# Patient Record
Sex: MALE | Race: WHITE
[De-identification: names, ages, dates, MRNs, and addresses within clinical notes are randomized per-mention and may not be internally consistent; named-entity substitution may affect disease eponyms.]

---

## 2017-05-03 ENCOUNTER — HOSPITAL ENCOUNTER (OUTPATIENT)
Dept: HOSPITAL 83 - CARD | Age: 57
Discharge: HOME | End: 2017-05-03
Attending: FAMILY MEDICINE
Payer: COMMERCIAL

## 2017-05-03 DIAGNOSIS — I34.0: ICD-10-CM

## 2017-05-03 DIAGNOSIS — R07.9: ICD-10-CM

## 2017-05-03 DIAGNOSIS — I25.2: Primary | ICD-10-CM

## 2017-05-03 DIAGNOSIS — R60.0: ICD-10-CM

## 2017-05-03 DIAGNOSIS — I07.1: ICD-10-CM

## 2017-06-05 ENCOUNTER — HOSPITAL ENCOUNTER (OUTPATIENT)
Dept: HOSPITAL 83 - CP | Age: 57
Discharge: HOME | End: 2017-06-05
Attending: FAMILY MEDICINE
Payer: COMMERCIAL

## 2017-06-05 DIAGNOSIS — J44.9: Primary | ICD-10-CM

## 2018-01-16 ENCOUNTER — HOSPITAL ENCOUNTER (OUTPATIENT)
Dept: HOSPITAL 83 - LAB | Age: 58
Discharge: HOME | End: 2018-01-16
Attending: INTERNAL MEDICINE
Payer: COMMERCIAL

## 2018-01-16 DIAGNOSIS — N40.0: ICD-10-CM

## 2018-01-16 DIAGNOSIS — E11.65: ICD-10-CM

## 2018-01-16 DIAGNOSIS — E11.40: Primary | ICD-10-CM

## 2018-01-16 DIAGNOSIS — E78.5: ICD-10-CM

## 2018-01-16 DIAGNOSIS — E55.9: ICD-10-CM

## 2018-01-16 LAB
25(OH)D3 SERPL-MCNC: 53.6 NG/ML (ref 30–100)
ALBUMIN SERPL-MCNC: 3.6 GM/DL (ref 3.1–4.5)
ALP SERPL-CCNC: 78 U/L (ref 45–117)
ALT SERPL W P-5'-P-CCNC: 28 U/L (ref 12–78)
APPEARANCE UR: (no result)
AST SERPL-CCNC: 14 IU/L (ref 3–35)
BACTERIA #/AREA URNS HPF: (no result) /[HPF]
BILIRUB UR QL STRIP: (no result)
BUN SERPL-MCNC: 12 MG/DL (ref 7–24)
CHLORIDE SERPL-SCNC: 106 MMOL/L (ref 98–107)
CHOLEST SERPL-MCNC: 293 MG/DL (ref ?–200)
COLOR UR: YELLOW
CREAT SERPL-MCNC: 0.83 MG/DL (ref 0.7–1.3)
EPI CELLS #/AREA URNS HPF: (no result) /[HPF]
GLUCOSE UR QL: NEGATIVE
HDLC SERPL-MCNC: 46 MG/DL (ref 40–60)
HGB UR QL STRIP: NEGATIVE
IRON SERPL-MCNC: 31 UG/DL (ref 65–175)
KETONES UR QL STRIP: (no result)
LDLC SERPL DIRECT ASSAY-MCNC: 217 MG/DL (ref 9–159)
LEUKOCYTE ESTERASE UR QL STRIP: NEGATIVE
NITRITE UR QL STRIP: NEGATIVE
PH UR STRIP: 5.5 [PH] (ref 5–9)
POTASSIUM SERPL-SCNC: 3.8 MMOL/L (ref 3.5–5.1)
PROT SERPL-MCNC: 7.2 GM/DL (ref 6.4–8.2)
RBC #/AREA URNS HPF: (no result) RBC/HPF (ref 0–2)
SODIUM SERPL-SCNC: 138 MMOL/L (ref 136–145)
SP GR UR: >= 1.03 (ref 1–1.03)
TESTOST SERPL-MCNC: 257 NG/DL (ref 241–827)
TIBC SERPL-MCNC: 334 UG/DL (ref 250–450)
TRIGL SERPL-MCNC: 148 MG/DL (ref ?–150)
TSH SERPL DL<=0.005 MIU/L-ACNC: 2.88 UIU/ML (ref 0.36–4.75)
UROBILINOGEN UR STRIP-MCNC: 0.2 E.U./DL (ref 0.2–1)
VITAMIN B12: > 2000 PG/ML (ref 247–911)
VLDLC SERPL CALC-MCNC: 30 MG/DL (ref 6–40)
WBC #/AREA URNS HPF: (no result) WBC/HPF (ref 0–5)

## 2018-04-24 ENCOUNTER — HOSPITAL ENCOUNTER (OUTPATIENT)
Dept: HOSPITAL 83 - LAB | Age: 58
Discharge: HOME | End: 2018-04-24
Attending: INTERNAL MEDICINE
Payer: COMMERCIAL

## 2018-04-24 DIAGNOSIS — E78.5: Primary | ICD-10-CM

## 2018-04-24 DIAGNOSIS — E11.40: ICD-10-CM

## 2018-04-24 DIAGNOSIS — E55.9: ICD-10-CM

## 2018-04-24 DIAGNOSIS — E11.65: ICD-10-CM

## 2018-04-24 LAB
25(OH)D3 SERPL-MCNC: 73.1 NG/ML (ref 30–100)
APPEARANCE UR: (no result)
BILIRUB UR QL STRIP: NEGATIVE
BUN SERPL-MCNC: 12 MG/DL (ref 7–24)
CHLORIDE SERPL-SCNC: 105 MMOL/L (ref 98–107)
CHOLEST SERPL-MCNC: 183 MG/DL (ref ?–200)
COLOR UR: YELLOW
CREAT SERPL-MCNC: 0.77 MG/DL (ref 0.7–1.3)
EPI CELLS #/AREA URNS HPF: (no result) /[HPF]
GLUCOSE UR QL: (no result)
HDLC SERPL-MCNC: 39 MG/DL (ref 40–60)
HGB UR QL STRIP: NEGATIVE
KETONES UR QL STRIP: (no result)
LDLC SERPL DIRECT ASSAY-MCNC: 126 MG/DL (ref 9–159)
LEUKOCYTE ESTERASE UR QL STRIP: NEGATIVE
MUCOUS THREADS URNS QL MICRO: (no result)
NITRITE UR QL STRIP: NEGATIVE
PH UR STRIP: 5.5 [PH] (ref 5–9)
POTASSIUM SERPL-SCNC: 4.1 MMOL/L (ref 3.5–5.1)
SODIUM SERPL-SCNC: 140 MMOL/L (ref 136–145)
SP GR UR: >= 1.03 (ref 1–1.03)
TRIGL SERPL-MCNC: 90 MG/DL (ref ?–150)
UROBILINOGEN UR STRIP-MCNC: 0.2 E.U./DL (ref 0.2–1)
VITAMIN B12: > 2000 PG/ML (ref 247–911)
VLDLC SERPL CALC-MCNC: 18 MG/DL (ref 6–40)
WBC #/AREA URNS HPF: (no result) WBC/HPF (ref 0–5)

## 2018-05-31 ENCOUNTER — OFFICE VISIT (OUTPATIENT)
Dept: CARDIOLOGY CLINIC | Age: 58
End: 2018-05-31
Payer: MEDICAID

## 2018-05-31 VITALS
RESPIRATION RATE: 14 BRPM | HEART RATE: 71 BPM | SYSTOLIC BLOOD PRESSURE: 102 MMHG | BODY MASS INDEX: 29.18 KG/M2 | DIASTOLIC BLOOD PRESSURE: 70 MMHG | WEIGHT: 197 LBS | HEIGHT: 69 IN

## 2018-05-31 DIAGNOSIS — I25.10 CORONARY ARTERY DISEASE INVOLVING NATIVE CORONARY ARTERY OF NATIVE HEART WITHOUT ANGINA PECTORIS: Primary | ICD-10-CM

## 2018-05-31 PROCEDURE — G8417 CALC BMI ABV UP PARAM F/U: HCPCS | Performed by: INTERNAL MEDICINE

## 2018-05-31 PROCEDURE — 99214 OFFICE O/P EST MOD 30 MIN: CPT | Performed by: INTERNAL MEDICINE

## 2018-05-31 PROCEDURE — 93000 ELECTROCARDIOGRAM COMPLETE: CPT | Performed by: INTERNAL MEDICINE

## 2018-05-31 PROCEDURE — G8598 ASA/ANTIPLAT THER USED: HCPCS | Performed by: INTERNAL MEDICINE

## 2018-05-31 PROCEDURE — 3017F COLORECTAL CA SCREEN DOC REV: CPT | Performed by: INTERNAL MEDICINE

## 2018-05-31 PROCEDURE — G8427 DOCREV CUR MEDS BY ELIG CLIN: HCPCS | Performed by: INTERNAL MEDICINE

## 2018-05-31 PROCEDURE — 4004F PT TOBACCO SCREEN RCVD TLK: CPT | Performed by: INTERNAL MEDICINE

## 2018-05-31 RX ORDER — LORATADINE 10 MG/1
10 TABLET ORAL DAILY
Qty: 90 TABLET | Refills: 3 | Status: SHIPPED | OUTPATIENT
Start: 2018-05-31 | End: 2019-09-03 | Stop reason: SDUPTHER

## 2018-06-04 ENCOUNTER — HOSPITAL ENCOUNTER (EMERGENCY)
Dept: HOSPITAL 83 - ED | Age: 58
Discharge: HOME | End: 2018-06-04
Payer: COMMERCIAL

## 2018-06-04 VITALS — BODY MASS INDEX: 28.2 KG/M2 | WEIGHT: 197 LBS | HEIGHT: 70 IN
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
## 2018-06-04 DIAGNOSIS — M54.6: Primary | ICD-10-CM

## 2018-06-04 LAB
BUN SERPL-MCNC: 11 MG/DL (ref 7–24)
CHLORIDE SERPL-SCNC: 100 MMOL/L (ref 98–107)
CREAT SERPL-MCNC: 0.81 MG/DL (ref 0.7–1.3)
POTASSIUM SERPL-SCNC: 4.4 MMOL/L (ref 3.5–5.1)
SODIUM SERPL-SCNC: 135 MMOL/L (ref 136–145)
TROPONIN I SERPL-MCNC: < 0.015 NG/ML (ref ?–0.04)

## 2018-07-08 ENCOUNTER — HOSPITAL ENCOUNTER (EMERGENCY)
Dept: HOSPITAL 83 - ED | Age: 58
Discharge: HOME | End: 2018-07-08
Payer: COMMERCIAL

## 2018-07-08 VITALS — BODY MASS INDEX: 29.18 KG/M2 | WEIGHT: 197 LBS | HEIGHT: 68.98 IN
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
## 2018-07-08 DIAGNOSIS — F17.200: ICD-10-CM

## 2018-07-08 DIAGNOSIS — S99.921A: Primary | ICD-10-CM
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
## 2018-08-13 ENCOUNTER — HOSPITAL ENCOUNTER (OUTPATIENT)
Dept: HOSPITAL 83 - LAB | Age: 58
Discharge: HOME | End: 2018-08-13
Attending: INTERNAL MEDICINE
Payer: COMMERCIAL

## 2018-08-13 DIAGNOSIS — E55.9: ICD-10-CM

## 2018-08-13 DIAGNOSIS — E29.1: ICD-10-CM

## 2018-08-13 DIAGNOSIS — E11.65: Primary | ICD-10-CM

## 2018-08-13 LAB
25(OH)D3 SERPL-MCNC: 25.3 NG/ML (ref 30–100)
ALBUMIN SERPL-MCNC: 3.3 GM/DL (ref 3.1–4.5)
ALP SERPL-CCNC: 94 U/L (ref 45–117)
ALT SERPL W P-5'-P-CCNC: 27 U/L (ref 12–78)
APPEARANCE UR: CLEAR
AST SERPL-CCNC: 15 IU/L (ref 3–35)
BILIRUB UR QL STRIP: NEGATIVE
BUN SERPL-MCNC: 9 MG/DL (ref 7–24)
CHLORIDE SERPL-SCNC: 105 MMOL/L (ref 98–107)
CHOLEST SERPL-MCNC: 406 MG/DL (ref ?–200)
COLOR UR: YELLOW
CREAT SERPL-MCNC: 0.74 MG/DL (ref 0.7–1.3)
EPI CELLS #/AREA URNS HPF: (no result) /[HPF]
GLUCOSE UR QL: NEGATIVE
HDLC SERPL-MCNC: 39 MG/DL (ref 40–60)
HGB UR QL STRIP: NEGATIVE
KETONES UR QL STRIP: NEGATIVE
LDLC SERPL DIRECT ASSAY-MCNC: 319 MG/DL (ref 9–159)
LEUKOCYTE ESTERASE UR QL STRIP: NEGATIVE
NITRITE UR QL STRIP: NEGATIVE
PH UR STRIP: 5.5 [PH] (ref 5–9)
POTASSIUM SERPL-SCNC: 4.5 MMOL/L (ref 3.5–5.1)
PROT SERPL-MCNC: 6.6 GM/DL (ref 6.4–8.2)
RBC #/AREA URNS HPF: (no result) RBC/HPF (ref 0–2)
SODIUM SERPL-SCNC: 138 MMOL/L (ref 136–145)
SP GR UR: 1.01 (ref 1–1.03)
TESTOST SERPL-MCNC: 409 NG/DL (ref 241–827)
TRIGL SERPL-MCNC: 239 MG/DL (ref ?–150)
UROBILINOGEN UR STRIP-MCNC: 0.2 E.U./DL (ref 0.2–1)
VLDLC SERPL CALC-MCNC: 48 MG/DL (ref 6–40)
WBC #/AREA URNS HPF: (no result) WBC/HPF (ref 0–5)

## 2018-10-27 ENCOUNTER — HOSPITAL ENCOUNTER (OUTPATIENT)
Age: 58
Setting detail: OBSERVATION
Discharge: HOME | End: 2018-10-29
Attending: INTERNAL MEDICINE | Admitting: INTERNAL MEDICINE
Payer: MEDICAID

## 2018-10-27 PROBLEM — Z91.148 NONCOMPLIANCE WITH MEDICATIONS: Status: ACTIVE | Noted: 2018-10-27

## 2018-10-27 PROBLEM — Z72.0 TOBACCO ABUSE: Status: ACTIVE | Noted: 2018-10-27

## 2018-10-27 PROBLEM — Z91.14 NONCOMPLIANCE WITH MEDICATIONS: Status: ACTIVE | Noted: 2018-10-27

## 2018-10-27 PROBLEM — R07.9 CHEST PAIN: Status: ACTIVE | Noted: 2018-10-27

## 2018-10-27 LAB
ABO/RH: NORMAL
ANION GAP SERPL CALCULATED.3IONS-SCNC: 11 MMOL/L (ref 7–16)
ANTIBODY SCREEN: NORMAL
APTT: 29.4 SEC (ref 24.5–35.1)
BUN BLDV-MCNC: 14 MG/DL (ref 6–20)
CALCIUM SERPL-MCNC: 8.8 MG/DL (ref 8.6–10.2)
CHLORIDE BLD-SCNC: 101 MMOL/L (ref 98–107)
CO2: 24 MMOL/L (ref 22–29)
CREAT SERPL-MCNC: 0.7 MG/DL (ref 0.7–1.2)
EKG ATRIAL RATE: 74 BPM
EKG P AXIS: 62 DEGREES
EKG P-R INTERVAL: 148 MS
EKG Q-T INTERVAL: 364 MS
EKG QRS DURATION: 100 MS
EKG QTC CALCULATION (BAZETT): 404 MS
EKG R AXIS: 19 DEGREES
EKG T AXIS: 33 DEGREES
EKG VENTRICULAR RATE: 74 BPM
GFR AFRICAN AMERICAN: >60
GFR NON-AFRICAN AMERICAN: >60 ML/MIN/1.73
GLUCOSE BLD-MCNC: 205 MG/DL (ref 74–109)
HBA1C MFR BLD: 9 % (ref 4–5.6)
HCT VFR BLD CALC: 45.8 % (ref 37–54)
HEMOGLOBIN: 15.2 G/DL (ref 12.5–16.5)
LV EF: 40 %
LVEF MODALITY: NORMAL
MCH RBC QN AUTO: 29.2 PG (ref 26–35)
MCHC RBC AUTO-ENTMCNC: 33.2 % (ref 32–34.5)
MCV RBC AUTO: 88.1 FL (ref 80–99.9)
METER GLUCOSE: 131 MG/DL (ref 70–110)
METER GLUCOSE: 146 MG/DL (ref 70–110)
METER GLUCOSE: 243 MG/DL (ref 70–110)
PDW BLD-RTO: 13.2 FL (ref 11.5–15)
PLATELET # BLD: 222 E9/L (ref 130–450)
PMV BLD AUTO: 10.8 FL (ref 7–12)
POTASSIUM REFLEX MAGNESIUM: 4.2 MMOL/L (ref 3.5–5)
PRO-BNP: 648 PG/ML (ref 0–125)
RBC # BLD: 5.2 E12/L (ref 3.8–5.8)
SODIUM BLD-SCNC: 136 MMOL/L (ref 132–146)
TROPONIN: 3.76 NG/ML (ref 0–0.03)
TROPONIN: 5.97 NG/ML (ref 0–0.03)
WBC # BLD: 13.5 E9/L (ref 4.5–11.5)

## 2018-10-27 PROCEDURE — 92941 PRQ TRLML REVSC TOT OCCL AMI: CPT | Performed by: INTERNAL MEDICINE

## 2018-10-27 PROCEDURE — 86901 BLOOD TYPING SEROLOGIC RH(D): CPT

## 2018-10-27 PROCEDURE — C1887 CATHETER, GUIDING: HCPCS

## 2018-10-27 PROCEDURE — C1874 STENT, COATED/COV W/DEL SYS: HCPCS

## 2018-10-27 PROCEDURE — 6360000004 HC RX CONTRAST MEDICATION: Performed by: NURSE PRACTITIONER

## 2018-10-27 PROCEDURE — 80048 BASIC METABOLIC PNL TOTAL CA: CPT

## 2018-10-27 PROCEDURE — 85730 THROMBOPLASTIN TIME PARTIAL: CPT

## 2018-10-27 PROCEDURE — 93005 ELECTROCARDIOGRAM TRACING: CPT

## 2018-10-27 PROCEDURE — 86850 RBC ANTIBODY SCREEN: CPT

## 2018-10-27 PROCEDURE — 85027 COMPLETE CBC AUTOMATED: CPT

## 2018-10-27 PROCEDURE — 99255 IP/OBS CONSLTJ NEW/EST HI 80: CPT | Performed by: INTERNAL MEDICINE

## 2018-10-27 PROCEDURE — 2580000003 HC RX 258: Performed by: INTERNAL MEDICINE

## 2018-10-27 PROCEDURE — 6360000002 HC RX W HCPCS

## 2018-10-27 PROCEDURE — 93458 L HRT ARTERY/VENTRICLE ANGIO: CPT | Performed by: INTERNAL MEDICINE

## 2018-10-27 PROCEDURE — 86900 BLOOD TYPING SEROLOGIC ABO: CPT

## 2018-10-27 PROCEDURE — 83036 HEMOGLOBIN GLYCOSYLATED A1C: CPT

## 2018-10-27 PROCEDURE — 6360000002 HC RX W HCPCS: Performed by: STUDENT IN AN ORGANIZED HEALTH CARE EDUCATION/TRAINING PROGRAM

## 2018-10-27 PROCEDURE — C1769 GUIDE WIRE: HCPCS

## 2018-10-27 PROCEDURE — 92928 PRQ TCAT PLMT NTRAC ST 1 LES: CPT | Performed by: INTERNAL MEDICINE

## 2018-10-27 PROCEDURE — 2709999900 HC NON-CHARGEABLE SUPPLY

## 2018-10-27 PROCEDURE — 6370000000 HC RX 637 (ALT 250 FOR IP): Performed by: STUDENT IN AN ORGANIZED HEALTH CARE EDUCATION/TRAINING PROGRAM

## 2018-10-27 PROCEDURE — G0378 HOSPITAL OBSERVATION PER HR: HCPCS

## 2018-10-27 PROCEDURE — 36415 COLL VENOUS BLD VENIPUNCTURE: CPT

## 2018-10-27 PROCEDURE — C1725 CATH, TRANSLUMIN NON-LASER: HCPCS

## 2018-10-27 PROCEDURE — 2500000003 HC RX 250 WO HCPCS

## 2018-10-27 PROCEDURE — 2580000003 HC RX 258: Performed by: STUDENT IN AN ORGANIZED HEALTH CARE EDUCATION/TRAINING PROGRAM

## 2018-10-27 PROCEDURE — 83880 ASSAY OF NATRIURETIC PEPTIDE: CPT

## 2018-10-27 PROCEDURE — 93307 TTE W/O DOPPLER COMPLETE: CPT

## 2018-10-27 PROCEDURE — 84484 ASSAY OF TROPONIN QUANT: CPT

## 2018-10-27 PROCEDURE — 6370000000 HC RX 637 (ALT 250 FOR IP)

## 2018-10-27 PROCEDURE — C1894 INTRO/SHEATH, NON-LASER: HCPCS

## 2018-10-27 PROCEDURE — 82962 GLUCOSE BLOOD TEST: CPT

## 2018-10-27 RX ORDER — DEXTROSE MONOHYDRATE 25 G/50ML
12.5 INJECTION, SOLUTION INTRAVENOUS PRN
Status: DISCONTINUED | OUTPATIENT
Start: 2018-10-27 | End: 2018-10-29 | Stop reason: HOSPADM

## 2018-10-27 RX ORDER — FUROSEMIDE 20 MG/1
20 TABLET ORAL DAILY PRN
Status: DISCONTINUED | OUTPATIENT
Start: 2018-10-27 | End: 2018-10-29 | Stop reason: HOSPADM

## 2018-10-27 RX ORDER — DEXTROSE MONOHYDRATE 50 MG/ML
100 INJECTION, SOLUTION INTRAVENOUS PRN
Status: DISCONTINUED | OUTPATIENT
Start: 2018-10-27 | End: 2018-10-29 | Stop reason: HOSPADM

## 2018-10-27 RX ORDER — FLUTICASONE PROPIONATE 50 MCG
1 SPRAY, SUSPENSION (ML) NASAL DAILY
Status: DISCONTINUED | OUTPATIENT
Start: 2018-10-27 | End: 2018-10-29 | Stop reason: HOSPADM

## 2018-10-27 RX ORDER — CETIRIZINE HYDROCHLORIDE 10 MG/1
5 TABLET ORAL DAILY
Status: DISCONTINUED | OUTPATIENT
Start: 2018-10-27 | End: 2018-10-29 | Stop reason: HOSPADM

## 2018-10-27 RX ORDER — FLUTICASONE PROPIONATE 50 MCG
1 SPRAY, SUSPENSION (ML) NASAL DAILY
COMMUNITY
End: 2019-09-03 | Stop reason: SDUPTHER

## 2018-10-27 RX ORDER — NICOTINE POLACRILEX 4 MG
15 LOZENGE BUCCAL PRN
Status: DISCONTINUED | OUTPATIENT
Start: 2018-10-27 | End: 2018-10-29 | Stop reason: HOSPADM

## 2018-10-27 RX ORDER — METOPROLOL SUCCINATE 25 MG/1
25 TABLET, EXTENDED RELEASE ORAL DAILY
Status: DISCONTINUED | OUTPATIENT
Start: 2018-10-28 | End: 2018-10-28

## 2018-10-27 RX ORDER — EZETIMIBE 10 MG/1
10 TABLET ORAL DAILY
COMMUNITY

## 2018-10-27 RX ORDER — ALBUTEROL SULFATE 90 UG/1
1 AEROSOL, METERED RESPIRATORY (INHALATION) 4 TIMES DAILY PRN
Status: DISCONTINUED | OUTPATIENT
Start: 2018-10-27 | End: 2018-10-29 | Stop reason: HOSPADM

## 2018-10-27 RX ORDER — ATORVASTATIN CALCIUM 80 MG/1
80 TABLET, FILM COATED ORAL NIGHTLY
COMMUNITY

## 2018-10-27 RX ORDER — ATORVASTATIN CALCIUM 40 MG/1
80 TABLET, FILM COATED ORAL NIGHTLY
Status: DISCONTINUED | OUTPATIENT
Start: 2018-10-27 | End: 2018-10-29 | Stop reason: HOSPADM

## 2018-10-27 RX ORDER — METOPROLOL SUCCINATE 25 MG/1
25 TABLET, EXTENDED RELEASE ORAL DAILY
Status: DISCONTINUED | OUTPATIENT
Start: 2018-10-28 | End: 2018-10-27

## 2018-10-27 RX ORDER — SODIUM CHLORIDE 0.9 % (FLUSH) 0.9 %
10 SYRINGE (ML) INJECTION EVERY 12 HOURS SCHEDULED
Status: DISCONTINUED | OUTPATIENT
Start: 2018-10-27 | End: 2018-10-29 | Stop reason: HOSPADM

## 2018-10-27 RX ORDER — OXYCODONE HYDROCHLORIDE AND ACETAMINOPHEN 5; 325 MG/1; MG/1
1 TABLET ORAL EVERY 4 HOURS PRN
Status: DISCONTINUED | OUTPATIENT
Start: 2018-10-27 | End: 2018-10-29 | Stop reason: HOSPADM

## 2018-10-27 RX ORDER — HEPARIN SODIUM 1000 [USP'U]/ML
2000 INJECTION, SOLUTION INTRAVENOUS; SUBCUTANEOUS PRN
Status: DISCONTINUED | OUTPATIENT
Start: 2018-10-27 | End: 2018-10-28

## 2018-10-27 RX ORDER — ASPIRIN 81 MG/1
81 TABLET ORAL DAILY
Status: DISCONTINUED | OUTPATIENT
Start: 2018-10-27 | End: 2018-10-29 | Stop reason: HOSPADM

## 2018-10-27 RX ORDER — SODIUM CHLORIDE 9 MG/ML
INJECTION, SOLUTION INTRAVENOUS CONTINUOUS
Status: ACTIVE | OUTPATIENT
Start: 2018-10-27 | End: 2018-10-28

## 2018-10-27 RX ORDER — NITROGLYCERIN 0.4 MG/1
0.4 TABLET SUBLINGUAL EVERY 5 MIN PRN
Status: DISCONTINUED | OUTPATIENT
Start: 2018-10-27 | End: 2018-10-29 | Stop reason: HOSPADM

## 2018-10-27 RX ORDER — SODIUM CHLORIDE 0.9 % (FLUSH) 0.9 %
10 SYRINGE (ML) INJECTION PRN
Status: DISCONTINUED | OUTPATIENT
Start: 2018-10-27 | End: 2018-10-29 | Stop reason: HOSPADM

## 2018-10-27 RX ORDER — HEPARIN SODIUM 10000 [USP'U]/100ML
12 INJECTION, SOLUTION INTRAVENOUS CONTINUOUS
Status: DISCONTINUED | OUTPATIENT
Start: 2018-10-27 | End: 2018-10-28

## 2018-10-27 RX ORDER — HEPARIN SODIUM 1000 [USP'U]/ML
4000 INJECTION, SOLUTION INTRAVENOUS; SUBCUTANEOUS PRN
Status: DISCONTINUED | OUTPATIENT
Start: 2018-10-27 | End: 2018-10-28

## 2018-10-27 RX ORDER — HEPARIN SODIUM 1000 [USP'U]/ML
4000 INJECTION, SOLUTION INTRAVENOUS; SUBCUTANEOUS ONCE
Status: COMPLETED | OUTPATIENT
Start: 2018-10-27 | End: 2018-10-27

## 2018-10-27 RX ORDER — ONDANSETRON 2 MG/ML
4 INJECTION INTRAMUSCULAR; INTRAVENOUS EVERY 6 HOURS PRN
Status: DISCONTINUED | OUTPATIENT
Start: 2018-10-27 | End: 2018-10-29 | Stop reason: HOSPADM

## 2018-10-27 RX ADMIN — Medication 10 ML: at 22:54

## 2018-10-27 RX ADMIN — HEPARIN SODIUM 4000 UNITS: 1000 INJECTION INTRAVENOUS; SUBCUTANEOUS at 14:42

## 2018-10-27 RX ADMIN — ATORVASTATIN CALCIUM 80 MG: 40 TABLET, FILM COATED ORAL at 22:50

## 2018-10-27 RX ADMIN — HEPARIN SODIUM AND DEXTROSE 12 UNITS/KG/HR: 10000; 5 INJECTION INTRAVENOUS at 14:42

## 2018-10-27 RX ADMIN — PERFLUTREN 1.65 MG: 6.52 INJECTION, SUSPENSION INTRAVENOUS at 14:43

## 2018-10-27 RX ADMIN — SODIUM CHLORIDE: 9 INJECTION, SOLUTION INTRAVENOUS at 22:50

## 2018-10-27 RX ADMIN — FLUTICASONE PROPIONATE 1 SPRAY: 50 SPRAY, METERED NASAL at 17:19

## 2018-10-27 ASSESSMENT — PAIN SCALES - GENERAL
PAINLEVEL_OUTOF10: 0

## 2018-10-27 NOTE — CONSULTS
[address]                 [address] [place],  [facility]                                 CONSULTATION    PATIENT NAME: Aldair Clark                     :         1960  MED REC NO:   [id number]                            ROOM:       6424  ACCOUNT NO:   [de-identified]                           ADMIT DATE:  10/27/2018  PROVIDER:     Merlynn Schaumann, APRN     CONSULT DATE:  10/27/2018    CONSULTING PROVIDER:  Dr. Jon France. REASON FOR THE CONSULT:  Non-ST-elevation myocardial infarction. HISTORY OF PRESENT ILLNESS:  The patient is a 82-year-old   male who is known to Dr. Jerad Foley, most recently seen in the office  with Dr. Kae Martinez on 2018. His medical history includes coronary  artery disease, status post multiple PCI; diabetes mellitus;  hypertension; hyperlipidemia; COPD; current tobacco abuse; vitamin D  deficiency; and medical noncompliance. The patient presented initially to [facility] on  10/27/2018 with complaints of chest pain. He states that prior to  presenting to the emergency department over the course of the past two  weeks he has been having intermittent midsternal chest \"pressure\"  lasting anywhere from three to four hours in duration, occurring at  rest, unchanged with exertion. He states that he has been taking two  sublingual nitroglycerin and having complete alleviation of each of  these episodes until last night. He states that last night he had  midsternal chest pressure, took two sublingual nitroglycerin at rest,  and his chest discomfort was alleviated. He went to bed. He states  that he woke up in the middle of the night with midsternal chest pain  with radiation to his back with accompanying diaphoresis, shortness of  breath, dizziness, and lightheadedness without syncope without  accompanying nausea and vomiting.   He states at that time he took a  total of three sublingual nitroglycerin [facility]  in [place], [place]) (further details unknown. Reports requested,  currently unavailable). 2.  The patient declined further evaluation post cardiac  catheterization with stress testing. 3.  Most recent echocardiogram on 05/30/2017:  EF 60%, no wall motion  abnormality, impaired LV relaxation, trace MR.  4.  Hypertension. 5.  Hyperlipidemia. 6.  Diabetes mellitus. 7.  COPD. 8.  Chronic tobacco abuse. 9.  Reported reactive airway with concurrent beta blocker use  (recently not on beta blocker). 10.  Vitamin D deficiency. FAMILY HISTORY:  The patient denies any family history of premature  coronary artery disease. SOCIAL HISTORY:  Currently smokes 1-1/2 packs of cigarettes per day  and has done so for 40 years. Denies alcohol. No illicit drug use. Caffeine intake includes coffee daily. ALLERGIES:  No known allergies. HOME MEDICATIONS:  Victoza, Ventolin, Amaryl, metformin, Lipitor 80  daily, Zetia, Flonase, Claritin, vitamin D2, aspirin enteric-coated 81  mg daily, Lasix 20 mg daily as needed, K-Dur 10 mEq daily as needed  (the patient states he has only been taking his antidiabetic  medication). HOSPITAL MEDICATIONS:  Aspirin enteric-coated 81 mg daily, Lipitor 80  mg daily, Zyrtec, Flonase, heparin 4000 units IV x1, Humalog, and  heparin drip per low-dose protocol. REVIEW OF SYSTEMS:  CONSTITUTIONAL:  Denies fevers, chills, night  sweats, weight loss, or fatigue. HEENT:  Denies headaches,  nosebleeds, blurred vision, oral pain, or aphthous lesion. MUSCULOSKELETAL:  Denies falls. Complains of pain in bilateral lower  extremities with ambulation. Denies edema to bilateral lower  extremities. NEUROLOGIC:  Denies dizziness, lightheadedness,  numbness, or tingling until today. See HPI. CARDIAC:  Chest pain,  see HPI. Denies palpitations or feelings of his heart racing. LUNGS:  Denies orthopnea or PND. Complains of dyspnea with chest pain earlier  today, see HPI. and medications independently reviewed (see above documentation)     Review of Systems:  Cardiac: As per HPI  General: No fever, chills  Pulmonary: As per HPI  HEENT: No visual disturbances, difficult swallowing  GI: No nausea, vomiting  Endocrine: No thyroid disease, +DM  Musculoskeletal: DELUCA x 4, no focal motor deficits  Skin: Intact, no rashes  Neuro/Psych: No headache or seizures     Physical Exam:  /76   Pulse 75   Temp 97.3 °F (36.3 °C) (Temporal)   Resp 16   Ht 5' 9\" (1.753 m)   Wt 202 lb 12.8 oz (92 kg)   SpO2 93%   BMI 29.95 kg/m²   Appearance: Awake, alert, no acute respiratory distress  Skin: Intact, no rash  Head: Normocephalic, atraumatic  Eyes: EOMI, no conjunctival erythema  ENMT: MMM, no rhinorrhea  Neck: Supple, no carotid bruits  Lungs: Clear to auscultation bilaterally. No wheezes, rales, or rhonchi. Cardiac: Regular rate and rhythm, +S1S2, no murmurs apparent  Abdomen: Soft, +bowel sounds  Extremities: Moves all extremities x 4, no lower extremity edema  Neurologic: No focal motor deficits apparent, normal mood and affect     Laboratory Tests:  Labs from [facility] reviewed     ECG: SR, old IWMI/septal MI pattern (similar to prior EKG)     Telemetry findings reviewed: SR, rate 70's     Impression:  1. Ischemic chest pain / NSTEMI  2. Known CAD s/p multiple prior PCI's  3. Ongoing tobacco abuse (1.5 PPD x 40 years)  4. HTN  5. HLD  6. DM  7. Medication noncompliance  8. History of reactive airway disease -- historically not on a BB  9. Echocardiogram (5/2017): normal LV and RV function, EF 60%, stage 1 DD, no significant valve disease     Plan:  - I had a lengthy discussion with . Cory Jacobo about undergoing a cardiac catheterization +/- PCI (and the possibility of needing a surgical evaluation for CABG). The indication, risks, benefits, and alternatives were discussed with him, and he agrees to proceed with the cardiac catheterization today with Dr. Mike Martínez.  Indication: 4, Score:

## 2018-10-27 NOTE — H&P
Chest pain  - consult cardiology  - cycle enzymes  - EKG  - cardiac cath  - resume home medications  - heparin gtt  - asa, statin     HTN  - monitor BP  - resume home medications    DM 2  - insulin sliding scale  - hold metformin  - monitor BGL  - check A1c    HLD  - lipid panel  - on atorvastatin    Tobacco abuse  - tobacco cessation  - nicotine patch    Noncompliance with medications  - A1c  - lipid panel  - tobacco cessation    CAD with stent placement  - resume home medications  - neurovascular checks          DVT Prophylaxis: heparin  Diet: Diet NPO Effective Now Exceptions are: Sips with Meds  Code Status: Full Code    PT/OT Eval Status: n/a    Dispo - admit telemetry       LYUBOV Wood - CNS    Thank you Becker DO Jose for the opportunity to be involved in this patient's care. If you have any questions or concerns please feel free to contact me at [phone]. [employer] Attending Note    Kyung Ye is a 56 y.o.   male who was seen and examined independently by myself and, after review,  I agree with the history, physical, and assessment documented by the mid-level above. Physical exam:  Cardiovascular: heart rate normal and normal sinus rhythm  Respiratory: Lungs clear to auscultation bilaterally without wheeze, rales or rhonchi throughout all fields. The remainder of the physical exam is as dictated above. Assessment/Plan  · Seen initially when pt arrived as a transfer and pt was symptomatic but later on started having acute chest pain again. Cardiology to do cath with possible PCI. Pt aware. [facility]  Please contact me via [employer]    NOTE: This report was transcribed using voice recognition software. Every effort was made to ensure accuracy; however, inadvertent computerized transcription errors may be present.

## 2018-10-27 NOTE — CONSULTS
I independently interviewed and examined the patient. I have reviewed the above documentation completed by the AMADOU. Please see my additional contributions to the HPI, physical exam, and assessment / medical decision making. Reason for Consultation: Chest pain, NSTEMI    History of Present Illness:  Transferred from [place] to [facility] earlier today for evaluation of chest pain and NSTEMI -- mid-sternal CP, +radiation of the pain to his back, multiple episodes of chest pain over the past 2 weeks, associated SOB/diaphoresis prior to admission, stuttering chest pain this afternoon. Currently with no acute distress at rest. Noncompliant taking medications as an outpatient. Ongoing tobacco abuse (1.5 PPD). PMH, PSH, [address], [place], and medications independently reviewed (see above documentation)    Review of Systems:  Cardiac: As per HPI  General: No fever, chills  Pulmonary: As per HPI  HEENT: No visual disturbances, difficult swallowing  GI: No nausea, vomiting  Endocrine: No thyroid disease, +DM  Musculoskeletal: DELUCA x 4, no focal motor deficits  Skin: Intact, no rashes  Neuro/Psych: No headache or seizures    Physical Exam:  /76   Pulse 75   Temp 97.3 °F (36.3 °C) (Temporal)   Resp 16   Ht 5' 9\" (1.753 m)   Wt 202 lb 12.8 oz (92 kg)   SpO2 93%   BMI 29.95 kg/m²   Appearance: Awake, alert, no acute respiratory distress  Skin: Intact, no rash  Head: Normocephalic, atraumatic  Eyes: EOMI, no conjunctival erythema  ENMT: MMM, no rhinorrhea  Neck: Supple, no carotid bruits  Lungs: Clear to auscultation bilaterally. No wheezes, rales, or rhonchi.   Cardiac: Regular rate and rhythm, +S1S2, no murmurs apparent  Abdomen: Soft, +bowel sounds  Extremities: Moves all extremities x 4, no lower extremity edema  Neurologic: No focal motor deficits apparent, normal mood and affect    Laboratory Tests:  Labs from [place] reviewed    ECG: SR, old IWMI/septal MI pattern (similar to prior EKG)    Telemetry findings reviewed: SR, rate 70's    Impression:  1. Ischemic chest pain / NSTEMI  2. Known CAD s/p multiple prior PCI's  3. Ongoing tobacco abuse (1.5 PPD x 40 years)  4. HTN  5. HLD  6. DM  7. Medication noncompliance  8. History of reactive airway disease -- historically not on a BB  9. Echocardiogram (5/2017): normal LV and RV function, EF 60%, stage 1 DD, no significant valve disease    Plan:  - I had a lengthy discussion with Mr. Smiley Helton about undergoing a cardiac catheterization +/- PCI (and the possibility of needing a surgical evaluation for CABG). The indication, risks, benefits, and alternatives were discussed with him, and he agrees to proceed with the cardiac catheterization today with Dr. Marietta Vela. Indication: 4, Score: 9.  - Hold metformin  - ASA / high intensity statin / NTG / heparin drip  - Aggressive risk factor modifications / tobacco cessation  - Echocardiogram (pending)  - Case discussed with the patient and multiple family members today    Thank you for allowing me to participate in your patient's care. Please feel free to contact me if you have any questions or concerns.     Chichi Reyes MD  [facility]) Cardiology

## 2018-10-28 LAB
ANION GAP SERPL CALCULATED.3IONS-SCNC: 12 MMOL/L (ref 7–16)
APTT: 220 SEC (ref 24.5–35.1)
BUN BLDV-MCNC: 10 MG/DL (ref 6–20)
CALCIUM SERPL-MCNC: 7.9 MG/DL (ref 8.6–10.2)
CHLORIDE BLD-SCNC: 101 MMOL/L (ref 98–107)
CO2: 21 MMOL/L (ref 22–29)
CREAT SERPL-MCNC: 0.7 MG/DL (ref 0.7–1.2)
GFR AFRICAN AMERICAN: >60
GFR NON-AFRICAN AMERICAN: >60 ML/MIN/1.73
GLUCOSE BLD-MCNC: 284 MG/DL (ref 74–109)
HCT VFR BLD CALC: 42.5 % (ref 37–54)
HEMOGLOBIN: 13.9 G/DL (ref 12.5–16.5)
MCH RBC QN AUTO: 28.9 PG (ref 26–35)
MCHC RBC AUTO-ENTMCNC: 32.7 % (ref 32–34.5)
MCV RBC AUTO: 88.4 FL (ref 80–99.9)
METER GLUCOSE: 184 MG/DL (ref 70–110)
METER GLUCOSE: 216 MG/DL (ref 70–110)
METER GLUCOSE: 236 MG/DL (ref 70–110)
METER GLUCOSE: 243 MG/DL (ref 70–110)
PDW BLD-RTO: 13.2 FL (ref 11.5–15)
PHOSPHORUS: 2 MG/DL (ref 2.5–4.5)
PLATELET # BLD: 180 E9/L (ref 130–450)
PMV BLD AUTO: 11 FL (ref 7–12)
POTASSIUM REFLEX MAGNESIUM: 3.7 MMOL/L (ref 3.5–5)
POTASSIUM SERPL-SCNC: 3.7 MMOL/L (ref 3.5–5)
RBC # BLD: 4.81 E12/L (ref 3.8–5.8)
SODIUM BLD-SCNC: 134 MMOL/L (ref 132–146)
TROPONIN: 2.44 NG/ML (ref 0–0.03)
TROPONIN: 4.41 NG/ML (ref 0–0.03)
WBC # BLD: 9.5 E9/L (ref 4.5–11.5)

## 2018-10-28 PROCEDURE — 82962 GLUCOSE BLOOD TEST: CPT

## 2018-10-28 PROCEDURE — 6370000000 HC RX 637 (ALT 250 FOR IP): Performed by: INTERNAL MEDICINE

## 2018-10-28 PROCEDURE — 84484 ASSAY OF TROPONIN QUANT: CPT

## 2018-10-28 PROCEDURE — 6370000000 HC RX 637 (ALT 250 FOR IP): Performed by: STUDENT IN AN ORGANIZED HEALTH CARE EDUCATION/TRAINING PROGRAM

## 2018-10-28 PROCEDURE — 84100 ASSAY OF PHOSPHORUS: CPT

## 2018-10-28 PROCEDURE — 80048 BASIC METABOLIC PNL TOTAL CA: CPT

## 2018-10-28 PROCEDURE — 99233 SBSQ HOSP IP/OBS HIGH 50: CPT | Performed by: INTERNAL MEDICINE

## 2018-10-28 PROCEDURE — 85027 COMPLETE CBC AUTOMATED: CPT

## 2018-10-28 PROCEDURE — 2580000003 HC RX 258: Performed by: STUDENT IN AN ORGANIZED HEALTH CARE EDUCATION/TRAINING PROGRAM

## 2018-10-28 PROCEDURE — 36415 COLL VENOUS BLD VENIPUNCTURE: CPT

## 2018-10-28 PROCEDURE — G0378 HOSPITAL OBSERVATION PER HR: HCPCS

## 2018-10-28 RX ORDER — POTASSIUM CHLORIDE 750 MG/1
10 TABLET, EXTENDED RELEASE ORAL DAILY PRN
Status: DISCONTINUED | OUTPATIENT
Start: 2018-10-28 | End: 2018-10-29 | Stop reason: HOSPADM

## 2018-10-28 RX ORDER — METOPROLOL SUCCINATE 25 MG/1
12.5 TABLET, EXTENDED RELEASE ORAL DAILY
Status: DISCONTINUED | OUTPATIENT
Start: 2018-10-29 | End: 2018-10-29 | Stop reason: HOSPADM

## 2018-10-28 RX ORDER — LISINOPRIL 5 MG/1
2.5 TABLET ORAL DAILY
Status: DISCONTINUED | OUTPATIENT
Start: 2018-10-28 | End: 2018-10-29 | Stop reason: HOSPADM

## 2018-10-28 RX ORDER — GLIMEPIRIDE 4 MG/1
4 TABLET ORAL
Status: DISCONTINUED | OUTPATIENT
Start: 2018-10-28 | End: 2018-10-29 | Stop reason: HOSPADM

## 2018-10-28 RX ADMIN — ATORVASTATIN CALCIUM 80 MG: 40 TABLET, FILM COATED ORAL at 20:10

## 2018-10-28 RX ADMIN — Medication 10 ML: at 07:46

## 2018-10-28 RX ADMIN — TICAGRELOR 90 MG: 90 TABLET ORAL at 20:10

## 2018-10-28 RX ADMIN — INSULIN LISPRO 2 UNITS: 100 INJECTION, SOLUTION INTRAVENOUS; SUBCUTANEOUS at 16:38

## 2018-10-28 RX ADMIN — INSULIN LISPRO 4 UNITS: 100 INJECTION, SOLUTION INTRAVENOUS; SUBCUTANEOUS at 10:42

## 2018-10-28 RX ADMIN — INSULIN LISPRO 2 UNITS: 100 INJECTION, SOLUTION INTRAVENOUS; SUBCUTANEOUS at 20:15

## 2018-10-28 RX ADMIN — TICAGRELOR 90 MG: 90 TABLET ORAL at 07:45

## 2018-10-28 RX ADMIN — GLIMEPIRIDE 4 MG: 4 TABLET ORAL at 07:50

## 2018-10-28 RX ADMIN — FLUTICASONE PROPIONATE 1 SPRAY: 50 SPRAY, METERED NASAL at 07:48

## 2018-10-28 RX ADMIN — INSULIN LISPRO 4 UNITS: 100 INJECTION, SOLUTION INTRAVENOUS; SUBCUTANEOUS at 06:10

## 2018-10-28 RX ADMIN — ASPIRIN 81 MG: 81 TABLET ORAL at 07:45

## 2018-10-28 RX ADMIN — CETIRIZINE HYDROCHLORIDE 5 MG: 10 TABLET ORAL at 07:46

## 2018-10-28 RX ADMIN — VITAMIN D, TAB 1000IU (100/BT) 5000 UNITS: 25 TAB at 07:46

## 2018-10-28 RX ADMIN — Medication 10 ML: at 20:10

## 2018-10-28 ASSESSMENT — PAIN SCALES - GENERAL
PAINLEVEL_OUTOF10: 0
PAINLEVEL_OUTOF10: 0

## 2018-10-28 NOTE — PROCEDURES
[address]                 [address] [facility],  [facility]                           CARDIAC CATHETERIZATION    PATIENT NAME: Abigail Winn                     :         1960  MED REC NO:   [id number]                            ROOM:       6424  ACCOUNT NO:   [de-identified]                           ADMIT DATE:  10/27/2018  PROVIDER:     Ruth Briggs DO    DATE OF PROCEDURE:  10/28/2018    HEART CATHETERIZATION, ANGIOPLASTY, AND STENTING PROCEDURE    PROCEDURES:  1. Left heart catheterization. 2.  Percutaneous coronary intervention with sequential and overlapping  2.5 x 18 mm Harmans, drug-eluting stents to the proximal LAD and 2.25 x  38 mm Azam, drug-eluting stents to the mid LAD. PHYSICIAN:  Kimberly Patel DO    INDICATIONS:  Unstable angina with recurrent chest discomfort in spite  of maximal medical therapy. COMPLICATIONS:  None. SEDATION:  Intravenous Versed. ANESTHESIA:  2% Xylocaine locally over the right radial artery,  intravenous fentanyl. SEDATION TIME:  I was present for sedation administration at  and  I ended sedation at  for a total face-to-face sedation time of 1  hour and 29 minutes. HEMODYNAMICS:  1. Opening aortic pressure 92/60 with a mean of 70.  2.  Left ventricular systolic pressure of 080.  3.  LVEDP 9.  4.  No significant gradient across the aortic valve. ANGIOGRAPHIC RESULTS:  LEFT MAIN:  No angiographically significant stenosis. LAD:  Proximal 80% stenosis proximal to his LAD stent. Long diffuse  99% subtotal occlusion throughout the entire length of his mid LAD  stent with WALT-1 flow. D1:  Ostial 80% stenosis. A 1.7-mm vessel. CIRCUMFLEX:  No angiographically significant stenosis. RAMUS:  20% diffuse in-stent restenosis. OM1:  No angiographically significant stenosis. RIGHT CORONARY ARTERY:  Proximal 100% chronic total occlusion, in  stent.   There is left-to-right

## 2018-10-28 NOTE — PROGRESS NOTES
Hospitalist Progress Note      PCP: Abhinav Mims DO    Date of Admission: 10/27/2018  Days in the hospital: 0    Chief Complaint: chest pain     Hospital Course:     Pt found to have NSTEMI with elvated trop. Crdiology was on the case. Cardiac cath showed Left main: 0% stenosis,LAD: 99 %  ISR stenosis, Circumflex: 0%  0 stenosis. RCA: Dominant. 100 %  stenosis. Pt had stent placed. Subjective  Patient seen and examined at bedside. He feels good and doesn't have any chest pain. Denies any compliants, NAD. Patient denies any fevers, chills, chest pain, shortness of breath, nausea, vomiting. Medications:  Reviewed    Infusion Medications    dextrose       Scheduled Medications    vitamin D  5,000 Units Oral Daily    glimepiride  4 mg Oral Daily with breakfast    sodium chloride flush  10 mL Intravenous 2 times per day    aspirin EC  81 mg Oral Daily    atorvastatin  80 mg Oral Nightly    fluticasone  1 spray Each Nare Daily    cetirizine  5 mg Oral Daily    insulin lispro  0-12 Units Subcutaneous TID WC    insulin lispro  0-6 Units Subcutaneous Nightly    ticagrelor  90 mg Oral BID    metoprolol succinate  25 mg Oral Daily     PRN Meds: potassium chloride, sodium chloride flush, magnesium hydroxide, ondansetron, furosemide, albuterol sulfate HFA, glucose, dextrose, glucagon (rDNA), dextrose, nitroGLYCERIN, oxyCODONE-acetaminophen      Intake/Output Summary (Last 24 hours) at 10/28/18 0931  Last data filed at 10/28/18 0727   Gross per 24 hour   Intake              897 ml   Output             1295 ml   Net             -398 ml       Exam:    BP (!) 99/56   Pulse 79   Temp 97.5 °F (36.4 °C) (Temporal)   Resp 16   Ht 5' 9\" (1.753 m)   Wt 202 lb 12.8 oz (92 kg)   SpO2 94%   BMI 29.95 kg/m²     General appearance:  No apparent distress, appears stated age and cooperative. HEENT:  Normal cephalic, atraumatic without obvious deformity. Pupils equal, round, and reactive to light.

## 2018-10-29 VITALS
RESPIRATION RATE: 18 BRPM | OXYGEN SATURATION: 93 % | HEART RATE: 68 BPM | TEMPERATURE: 98.4 F | HEIGHT: 69 IN | DIASTOLIC BLOOD PRESSURE: 62 MMHG | BODY MASS INDEX: 30.04 KG/M2 | SYSTOLIC BLOOD PRESSURE: 100 MMHG | WEIGHT: 202.8 LBS

## 2018-10-29 LAB
METER GLUCOSE: 206 MG/DL (ref 70–110)
METER GLUCOSE: 218 MG/DL (ref 70–110)
PHOSPHORUS: 2.3 MG/DL (ref 2.5–4.5)
PLATELET # BLD: 182 E9/L (ref 130–450)

## 2018-10-29 PROCEDURE — 6370000000 HC RX 637 (ALT 250 FOR IP): Performed by: INTERNAL MEDICINE

## 2018-10-29 PROCEDURE — 2580000003 HC RX 258: Performed by: STUDENT IN AN ORGANIZED HEALTH CARE EDUCATION/TRAINING PROGRAM

## 2018-10-29 PROCEDURE — 90686 IIV4 VACC NO PRSV 0.5 ML IM: CPT | Performed by: STUDENT IN AN ORGANIZED HEALTH CARE EDUCATION/TRAINING PROGRAM

## 2018-10-29 PROCEDURE — 6360000002 HC RX W HCPCS: Performed by: STUDENT IN AN ORGANIZED HEALTH CARE EDUCATION/TRAINING PROGRAM

## 2018-10-29 PROCEDURE — 36415 COLL VENOUS BLD VENIPUNCTURE: CPT

## 2018-10-29 PROCEDURE — 99232 SBSQ HOSP IP/OBS MODERATE 35: CPT | Performed by: INTERNAL MEDICINE

## 2018-10-29 PROCEDURE — G0378 HOSPITAL OBSERVATION PER HR: HCPCS

## 2018-10-29 PROCEDURE — G0008 ADMIN INFLUENZA VIRUS VAC: HCPCS | Performed by: STUDENT IN AN ORGANIZED HEALTH CARE EDUCATION/TRAINING PROGRAM

## 2018-10-29 PROCEDURE — 85049 AUTOMATED PLATELET COUNT: CPT

## 2018-10-29 PROCEDURE — 84100 ASSAY OF PHOSPHORUS: CPT

## 2018-10-29 PROCEDURE — 6370000000 HC RX 637 (ALT 250 FOR IP): Performed by: STUDENT IN AN ORGANIZED HEALTH CARE EDUCATION/TRAINING PROGRAM

## 2018-10-29 PROCEDURE — 82962 GLUCOSE BLOOD TEST: CPT

## 2018-10-29 RX ORDER — METOPROLOL SUCCINATE 25 MG/1
12.5 TABLET, EXTENDED RELEASE ORAL DAILY
Qty: 30 TABLET | Refills: 0 | Status: SHIPPED | OUTPATIENT
Start: 2018-10-30 | End: 2018-10-30 | Stop reason: SDUPTHER

## 2018-10-29 RX ORDER — LISINOPRIL 2.5 MG/1
2.5 TABLET ORAL DAILY
Qty: 30 TABLET | Refills: 0 | Status: SHIPPED | OUTPATIENT
Start: 2018-10-30 | End: 2018-10-30 | Stop reason: SDUPTHER

## 2018-10-29 RX ADMIN — INSULIN LISPRO 4 UNITS: 100 INJECTION, SOLUTION INTRAVENOUS; SUBCUTANEOUS at 06:52

## 2018-10-29 RX ADMIN — Medication 10 ML: at 09:27

## 2018-10-29 RX ADMIN — TICAGRELOR 90 MG: 90 TABLET ORAL at 09:25

## 2018-10-29 RX ADMIN — CETIRIZINE HYDROCHLORIDE 5 MG: 10 TABLET ORAL at 09:26

## 2018-10-29 RX ADMIN — INSULIN LISPRO 4 UNITS: 100 INJECTION, SOLUTION INTRAVENOUS; SUBCUTANEOUS at 11:27

## 2018-10-29 RX ADMIN — ASPIRIN 81 MG: 81 TABLET ORAL at 09:26

## 2018-10-29 RX ADMIN — LISINOPRIL 2.5 MG: 5 TABLET ORAL at 09:24

## 2018-10-29 RX ADMIN — FLUTICASONE PROPIONATE 1 SPRAY: 50 SPRAY, METERED NASAL at 09:24

## 2018-10-29 RX ADMIN — GLIMEPIRIDE 4 MG: 4 TABLET ORAL at 09:25

## 2018-10-29 RX ADMIN — INFLUENZA A VIRUS A/MICHIGAN/45/2015 X-275 (H1N1) ANTIGEN (FORMALDEHYDE INACTIVATED), INFLUENZA A VIRUS A/SINGAPORE/INFIMH-16-0019/2016 IVR-186 (H3N2) ANTIGEN (FORMALDEHYDE INACTIVATED), INFLUENZA B VIRUS B/PHUKET/3073/2013 ANTIGEN (FORMALDEHYDE INACTIVATED), AND INFLUENZA B VIRUS B/MARYLAND/15/2016 BX-69A ANTIGEN (FORMALDEHYDE INACTIVATED) 0.5 ML: 15; 15; 15; 15 INJECTION, SUSPENSION INTRAMUSCULAR at 12:31

## 2018-10-29 RX ADMIN — METOPROLOL SUCCINATE 12.5 MG: 25 TABLET, FILM COATED, EXTENDED RELEASE ORAL at 09:25

## 2018-10-29 RX ADMIN — VITAMIN D, TAB 1000IU (100/BT) 5000 UNITS: 25 TAB at 09:26

## 2018-10-29 ASSESSMENT — PAIN SCALES - GENERAL
PAINLEVEL_OUTOF10: 0
PAINLEVEL_OUTOF10: 0

## 2018-10-29 NOTE — PROGRESS NOTES
distress, appears stated age and cooperative. HEENT:  Normal cephalic, atraumatic without obvious deformity. Pupils equal, round, and reactive to light. Extra ocular muscles intact. Conjunctivae/corneas clear. Neck: Supple, with full range of motion. No jugular venous distention. Trachea midline. Respiratory:  Normal respiratory effort. Clear to auscultation, bilaterally with inspiratory and expiratory wheezes  Cardiovascular:  Regular rate and rhythm with normal S1/S2 without murmurs, rubs or gallops. Abdomen: Soft, non-tender, non-distended with normal bowel sounds. Musculoskeletal:  No clubbing, cyanosis or edema bilaterally. Full range of motion without deformity. Skin: Skin color, texture, turgor normal.  No rashes or lesions. Neurologic:  Neurovascularly intact without any focal sensory/motor deficits  Psychiatric:  Alert and oriented, thought content appropriate, normal insight  Capillary Refill: Brisk,< 3 seconds   Peripheral Pulses: +2 palpable, equal bilaterally       Labs:   Recent Labs      10/27/18   1343  10/28/18   0424  10/29/18   0516   WBC  13.5*  9.5   --    HGB  15.2  13.9   --    HCT  45.8  42.5   --    PLT  222  180  182     Recent Labs      10/27/18   1343  10/28/18   0424  10/29/18   0516   NA  136  134   --    K  4.2  3.7  3.7   --    CL  101  101   --    CO2  24  21*   --    BUN  14  10   --    CREATININE  0.7  0.7   --    CALCIUM  8.8  7.9*   --    PHOS   --   2.0*  2.3*     No results for input(s): AST, ALT, BILIDIR, BILITOT, ALKPHOS in the last 72 hours. No results for input(s): INR in the last 72 hours.   Recent Labs      10/27/18   2307  10/28/18   0424  10/28/18   1122   TROPONINI  5.97*  4.41*  2.44*       Imaging:  No orders to display         Assessment/Plan:  Active Hospital Problems    Diagnosis Date Noted    Chest pain [R07.9] 10/27/2018    Tobacco abuse [Z72.0] 10/27/2018    Noncompliance with medications [Z91.14] 10/27/2018    HTN (hypertension) [I10]     Hyperlipemia [E78.5]     CAD (coronary artery disease) [I25.10]     Type 2 diabetes mellitus, without long-term current use of insulin (HCC) [E11.9]      ·     Plan:  NSTEMI  - cardiology input appreciated  - cycle enzymes  - EKG  - s/p cardiac cath - Left main: 0% stenosis  LAD: 99 %  ISR stenosis  Circumflex: 0%  0 stenosis. RCA: Dominant. 100 %  stenosis. LV angio: 40%  ejection fraction  - resume home medications  - DAPT  - statin      HTN  - monitor BP  - resume home medications     DM 2  - insulin sliding scale  - hold metformin  - monitor BGL  - check A1c     HLD  - lipid panel  - on atorvastatin     Tobacco abuse  - tobacco cessation  - nicotine patch     Noncompliance with medications  - A1c  - lipid panel  - tobacco cessation     CAD with stent placement  - resume home medications  - neurovascular checks         DVT Prophylaxis: heparin  Diet: Diet NPO Effective Now Exceptions are: Sips with Meds  Code Status: Full Code     PT/OT Eval Status: n/a     Dispo - obs telemetry - pending d/c         Zulema Avilez MD  [employer]  Please contact me through perfect serve    NOTE: This report was transcribed using voice recognition software. Every effort was made to ensure accuracy; however, inadvertent computerized transcription errors may be present.

## 2018-10-29 NOTE — DISCHARGE SUMMARY
Hospital Medicine Discharge Summary    Patient ID: Cordelia Yepez      Patient's PCP: Sintia Hightower DO    Admit Date: 10/27/2018     Discharge Date:    10/29/18    Admitting Physician: Ayaan Cervantes DO     Discharge Physician: Wing Blayne MD     Discharge Diagnoses: Active Hospital Problems    Diagnosis Date Noted    Chest pain [R07.9] 10/27/2018    Tobacco abuse [Z72.0] 10/27/2018    Noncompliance with medications [Z91.14] 10/27/2018    HTN (hypertension) [I10]     Hyperlipemia [E78.5]     CAD (coronary artery disease) [I25.10]     Type 2 diabetes mellitus, without long-term current use of insulin (HCC) [E11.9]        The patient was seen and examined on day of discharge and this discharge summary is in conjunction with any daily progress note from day of discharge. Admission HPI: 56 y.o. male who presented to [address] [place] [address] with chest pain. History obtained from the patient. He states he has been having intermittent chest pain for the past 10 days but it would resolve with his NTG. Last night he developed 8/10 mid-sternal chest pain that would not resolve after 3 NTG radiating to his arms and back, associated with shortness of breath and diaphoresis. He went to [facility] for evaluation. In the ED, he was found to have elevated troponin of 0.22 and questionable EKG changes. He was transferred to [facility] for cardiology services for NSTEMI and possible PCI placement. He has PMH of HTN, DM, HLD and CAD with previous stent placement. He is a current smoker of 1.5 PPD and does admit to some marijuana use. Currently, he is sitting up in bed. He is alert and oriented to person, place and time. He states the pain had been completely gone and is now coming back-- a repeat EKG has been ordered. He denies shortness of breath, nausea or diaphoresis. No leg edema is appreciated.      Hospital Course:   Mr. Cordelia Yepez, a 56y.o. year old male  who  has a past medical Date    WBC 9.5 10/28/2018    HGB 13.9 10/28/2018    HCT 42.5 10/28/2018     10/29/2018       Renal:    Lab Results   Component Value Date     10/28/2018    K 3.7 10/28/2018    K 3.7 10/28/2018     10/28/2018    CO2 21 10/28/2018    BUN 10 10/28/2018    CREATININE 0.7 10/28/2018    CALCIUM 7.9 10/28/2018    PHOS 2.3 10/29/2018       Imaging:  No orders to display         Discharge Medications:     Current Discharge Medication List           Details   lisinopril (PRINIVIL;ZESTRIL) 2.5 MG tablet Take 1 tablet by mouth daily  Qty: 30 tablet, Refills: 0      metoprolol succinate (TOPROL XL) 25 MG extended release tablet Take 0.5 tablets by mouth daily  Qty: 30 tablet, Refills: 0      ticagrelor (BRILINTA) 90 MG TABS tablet Take 1 tablet by mouth 2 times daily  Qty: 60 tablet, Refills: 0      influenza quadrivalent split vaccine (FLUZONE;FLUARIX;FLULAVAL;AFLURIA) 0.5 ML injection Inject 0.5 mLs into the muscle once for 1 dose  Qty: 0.5 mL, Refills: 0              Details   VICTOZA 18 MG/3ML SOPN SC injection Inject into the skin daily       VENTOLIN  (90 BASE) MCG/ACT inhaler Inhale 1 puff into the lungs 4 times daily as needed       glimepiride (AMARYL) 4 MG tablet Take 4 mg by mouth every morning (before breakfast)       metFORMIN (GLUCOPHAGE) 1000 MG tablet Take 1,000 mg by mouth 2 times daily (with meals)       atorvastatin (LIPITOR) 80 MG tablet Take 80 mg by mouth nightly      ezetimibe (ZETIA) 10 MG tablet Take 10 mg by mouth daily      fluticasone (FLONASE) 50 MCG/ACT nasal spray 1 spray by Each Nare route daily      loratadine (CLARITIN) 10 MG tablet Take 1 tablet by mouth daily  Qty: 90 tablet, Refills: 3      vitamin D (ERGOCALCIFEROL) 97971 UNITS CAPS capsule Take 5,000 Units by mouth daily       aspirin EC 81 MG EC tablet Take 81 mg by mouth daily      furosemide (LASIX) 20 MG tablet Take 1 tablet by mouth daily as needed (swelling)  Qty: 30 tablet, Refills: 11      potassium

## 2018-10-30 RX ORDER — LISINOPRIL 2.5 MG/1
2.5 TABLET ORAL DAILY
Qty: 30 TABLET | Refills: 0 | Status: SHIPPED | OUTPATIENT
Start: 2018-10-30 | End: 2019-01-07 | Stop reason: SDUPTHER

## 2018-10-30 RX ORDER — METOPROLOL SUCCINATE 25 MG/1
12.5 TABLET, EXTENDED RELEASE ORAL DAILY
Qty: 30 TABLET | Refills: 0 | Status: SHIPPED | OUTPATIENT
Start: 2018-10-30 | End: 2019-03-27 | Stop reason: SDUPTHER

## 2018-12-13 ENCOUNTER — OFFICE VISIT (OUTPATIENT)
Dept: CARDIOLOGY CLINIC | Age: 58
End: 2018-12-13
Payer: MEDICAID

## 2018-12-13 VITALS
WEIGHT: 197 LBS | SYSTOLIC BLOOD PRESSURE: 110 MMHG | HEIGHT: 69 IN | BODY MASS INDEX: 29.18 KG/M2 | RESPIRATION RATE: 16 BRPM | HEART RATE: 88 BPM | DIASTOLIC BLOOD PRESSURE: 72 MMHG

## 2018-12-13 DIAGNOSIS — I25.10 CORONARY ARTERY DISEASE DUE TO LIPID RICH PLAQUE: Primary | ICD-10-CM

## 2018-12-13 DIAGNOSIS — I25.83 CORONARY ARTERY DISEASE DUE TO LIPID RICH PLAQUE: Primary | ICD-10-CM

## 2018-12-13 PROCEDURE — G8598 ASA/ANTIPLAT THER USED: HCPCS | Performed by: INTERNAL MEDICINE

## 2018-12-13 PROCEDURE — 4004F PT TOBACCO SCREEN RCVD TLK: CPT | Performed by: INTERNAL MEDICINE

## 2018-12-13 PROCEDURE — G8427 DOCREV CUR MEDS BY ELIG CLIN: HCPCS | Performed by: INTERNAL MEDICINE

## 2018-12-13 PROCEDURE — 93000 ELECTROCARDIOGRAM COMPLETE: CPT | Performed by: INTERNAL MEDICINE

## 2018-12-13 PROCEDURE — 3017F COLORECTAL CA SCREEN DOC REV: CPT | Performed by: INTERNAL MEDICINE

## 2018-12-13 PROCEDURE — G8482 FLU IMMUNIZE ORDER/ADMIN: HCPCS | Performed by: INTERNAL MEDICINE

## 2018-12-13 PROCEDURE — G8417 CALC BMI ABV UP PARAM F/U: HCPCS | Performed by: INTERNAL MEDICINE

## 2018-12-13 PROCEDURE — 99214 OFFICE O/P EST MOD 30 MIN: CPT | Performed by: INTERNAL MEDICINE

## 2018-12-13 NOTE — PROGRESS NOTES
Take 81 mg by mouth daily      furosemide (LASIX) 20 MG tablet Take 1 tablet by mouth daily as needed (swelling) 30 tablet 11    potassium chloride (KLOR-CON M) 10 MEQ extended release tablet Take 1 tablet by mouth daily as needed (swelling) 30 tablet 11     No current facility-administered medications for this visit. Social History     Social History    Marital status:      Spouse name: N/A    Number of children: N/A    Years of education: N/A     Occupational History    carpentry Works Part-Time     Social History Main Topics    Smoking status: Current Every Day Smoker     Packs/day: 1.50     Types: Cigarettes    Smokeless tobacco: Never Used    Alcohol use No    Drug use: No    Sexual activity: Not on file     Other Topics Concern    Not on file     Social History Narrative    No narrative on file       Family History   Problem Relation Age of Onset    Diabetes Father     Cancer Father         leukemia       Review of Systems:  Heart: as above   Lungs: as above   Eyes: denies changes in vision or discharge. Ears: denies changes in hearing or pain. Nose: denies epistaxis or masses   Throat: denies sore throat or trouble swallowing. Neuro: denies numbness, tingling, tremors. Skin: denies rashes or itching. : denies hematuria, dysuria   GI: denies vomiting, diarrhea   Psych: denies mood changed, anxiety, depression. All other systems negative. Physical Exam   /72   Pulse 88   Resp 16   Ht 5' 9\" (1.753 m)   Wt 197 lb (89.4 kg)   BMI 29.09 kg/m²   Constitutional: Oriented to person, place, and time. Well-developed and well-nourished. No distress. Head: Normocephalic and atraumatic. Eyes: EOM are normal. Pupils are equal, round, and reactive to light. Neck: Normal range of motion. Neck supple. No hepatojugular reflux and no JVD present. Carotid bruit is not present. No tracheal deviation present. No thyromegaly present.    Cardiovascular: Normal rate,

## 2018-12-18 ENCOUNTER — HOSPITAL ENCOUNTER (OUTPATIENT)
Dept: HOSPITAL 83 - LAB | Age: 58
Discharge: HOME | End: 2018-12-18
Attending: INTERNAL MEDICINE
Payer: COMMERCIAL

## 2018-12-18 DIAGNOSIS — E11.65: ICD-10-CM

## 2018-12-18 DIAGNOSIS — E55.9: ICD-10-CM

## 2018-12-18 DIAGNOSIS — E78.5: ICD-10-CM

## 2018-12-18 DIAGNOSIS — E11.40: Primary | ICD-10-CM

## 2018-12-18 LAB
25(OH)D3 SERPL-MCNC: 44.6 NG/ML (ref 30–100)
ALBUMIN SERPL-MCNC: 3.4 GM/DL (ref 3.1–4.5)
ALP SERPL-CCNC: 94 U/L (ref 45–117)
ALT SERPL W P-5'-P-CCNC: 41 U/L (ref 12–78)
APPEARANCE UR: (no result)
AST SERPL-CCNC: 29 IU/L (ref 3–35)
BACTERIA #/AREA URNS HPF: (no result) /[HPF]
BILIRUB UR QL STRIP: NEGATIVE
BUN SERPL-MCNC: 11 MG/DL (ref 7–24)
CHLORIDE SERPL-SCNC: 104 MMOL/L (ref 98–107)
CHOLEST SERPL-MCNC: 156 MG/DL (ref ?–200)
COLOR UR: YELLOW
CREAT SERPL-MCNC: 0.76 MG/DL (ref 0.7–1.3)
EPI CELLS #/AREA URNS HPF: (no result) /[HPF]
GLUCOSE UR QL: (no result)
HDLC SERPL-MCNC: 37 MG/DL (ref 40–60)
HGB UR QL STRIP: NEGATIVE
KETONES UR QL STRIP: NEGATIVE
LDLC SERPL DIRECT ASSAY-MCNC: 86 MG/DL (ref 9–159)
LEUKOCYTE ESTERASE UR QL STRIP: NEGATIVE
MUCOUS THREADS URNS QL MICRO: (no result)
NITRITE UR QL STRIP: NEGATIVE
PH UR STRIP: 5.5 [PH] (ref 5–9)
POTASSIUM SERPL-SCNC: 4.2 MMOL/L (ref 3.5–5.1)
PROT SERPL-MCNC: 7.1 GM/DL (ref 6.4–8.2)
RBC #/AREA URNS HPF: (no result) RBC/HPF (ref 0–2)
SODIUM SERPL-SCNC: 136 MMOL/L (ref 136–145)
SP GR UR: 1.02 (ref 1–1.03)
TRIGL SERPL-MCNC: 163 MG/DL (ref ?–150)
UROBILINOGEN UR STRIP-MCNC: 0.2 E.U./DL (ref 0.2–1)
VITAMIN B12: 1422 PG/ML (ref 247–911)
VLDLC SERPL CALC-MCNC: 33 MG/DL (ref 6–40)
WBC #/AREA URNS HPF: (no result) WBC/HPF (ref 0–5)

## 2019-01-11 RX ORDER — LISINOPRIL 2.5 MG/1
2.5 TABLET ORAL DAILY
Qty: 30 TABLET | Refills: 5 | Status: SHIPPED | OUTPATIENT
Start: 2019-01-11 | End: 2019-09-03 | Stop reason: SDUPTHER

## 2019-03-26 ENCOUNTER — HOSPITAL ENCOUNTER (OUTPATIENT)
Dept: HOSPITAL 83 - LAB | Age: 59
Discharge: HOME | End: 2019-03-26
Attending: INTERNAL MEDICINE
Payer: COMMERCIAL

## 2019-03-26 DIAGNOSIS — E78.5: ICD-10-CM

## 2019-03-26 DIAGNOSIS — E11.65: Primary | ICD-10-CM

## 2019-03-26 DIAGNOSIS — E55.9: ICD-10-CM

## 2019-03-26 LAB
25(OH)D3 SERPL-MCNC: 58.9 NG/ML (ref 30–100)
ALBUMIN SERPL-MCNC: 3.5 GM/DL (ref 3.1–4.5)
ALP SERPL-CCNC: 85 U/L (ref 45–117)
ALT SERPL W P-5'-P-CCNC: 86 U/L (ref 12–78)
APPEARANCE UR: (no result)
AST SERPL-CCNC: 43 IU/L (ref 3–35)
BACTERIA #/AREA URNS HPF: (no result) /[HPF]
BILIRUB UR QL STRIP: NEGATIVE
BUN SERPL-MCNC: 13 MG/DL (ref 7–24)
CASTS URNS QL MICRO: (no result)
CHLORIDE SERPL-SCNC: 105 MMOL/L (ref 98–107)
CHOLEST SERPL-MCNC: 113 MG/DL (ref ?–200)
COLOR UR: YELLOW
CREAT SERPL-MCNC: 0.8 MG/DL (ref 0.7–1.3)
EPI CELLS #/AREA URNS HPF: (no result) /[HPF]
GLUCOSE UR QL: (no result)
HDLC SERPL-MCNC: 38 MG/DL (ref 40–60)
HGB UR QL STRIP: NEGATIVE
KETONES UR QL STRIP: NEGATIVE
LDLC SERPL DIRECT ASSAY-MCNC: 59 MG/DL (ref 9–159)
LEUKOCYTE ESTERASE UR QL STRIP: NEGATIVE
NITRITE UR QL STRIP: NEGATIVE
PH UR STRIP: 5.5 [PH] (ref 5–9)
POTASSIUM SERPL-SCNC: 4.2 MMOL/L (ref 3.5–5.1)
PROT SERPL-MCNC: 7.2 GM/DL (ref 6.4–8.2)
RBC #/AREA URNS HPF: (no result) RBC/HPF (ref 0–2)
SODIUM SERPL-SCNC: 139 MMOL/L (ref 136–145)
SP GR UR: 1.02 (ref 1–1.03)
TESTOST SERPL-MCNC: 376 NG/DL (ref 241–827)
TRIGL SERPL-MCNC: 80 MG/DL (ref ?–150)
UROBILINOGEN UR STRIP-MCNC: 0.2 E.U./DL (ref 0.2–1)
VLDLC SERPL CALC-MCNC: 16 MG/DL (ref 6–40)
WBC #/AREA URNS HPF: (no result) WBC/HPF (ref 0–5)

## 2019-03-27 RX ORDER — METOPROLOL SUCCINATE 25 MG/1
12.5 TABLET, EXTENDED RELEASE ORAL DAILY
Qty: 30 TABLET | Refills: 3 | Status: SHIPPED
Start: 2019-03-27 | End: 2020-02-07 | Stop reason: SDUPTHER

## 2019-06-04 RX ORDER — DIMENHYDRINATE 50 MG
1 TABLET ORAL DAILY
Refills: 1 | COMMUNITY
Start: 2019-04-01

## 2019-06-04 RX ORDER — 1.1% SODIUM FLUORIDE PRESCRIPTION DENTAL CREAM 5 MG/G
CREAM DENTAL
Refills: 1 | COMMUNITY
Start: 2019-03-30 | End: 2019-09-03

## 2019-06-04 RX ORDER — MECOBAL/LEVOMEFOLAT CA/B6 PHOS 2-3-35 MG
1 TABLET ORAL DAILY
Refills: 1 | COMMUNITY
Start: 2019-05-08

## 2019-06-04 RX ORDER — EMPAGLIFLOZIN 25 MG/1
1 TABLET, FILM COATED ORAL DAILY
Refills: 1 | COMMUNITY
Start: 2019-04-01

## 2019-09-03 ENCOUNTER — OFFICE VISIT (OUTPATIENT)
Dept: CARDIOLOGY CLINIC | Age: 59
End: 2019-09-03
Payer: MEDICAID

## 2019-09-03 ENCOUNTER — HOSPITAL ENCOUNTER (OUTPATIENT)
Dept: HOSPITAL 83 - LAB | Age: 59
Discharge: HOME | End: 2019-09-03
Attending: INTERNAL MEDICINE
Payer: COMMERCIAL

## 2019-09-03 VITALS
DIASTOLIC BLOOD PRESSURE: 62 MMHG | RESPIRATION RATE: 18 BRPM | HEIGHT: 69 IN | WEIGHT: 204 LBS | SYSTOLIC BLOOD PRESSURE: 112 MMHG | HEART RATE: 79 BPM | BODY MASS INDEX: 30.21 KG/M2

## 2019-09-03 DIAGNOSIS — I10 ESSENTIAL HYPERTENSION: Primary | ICD-10-CM

## 2019-09-03 DIAGNOSIS — E11.65: ICD-10-CM

## 2019-09-03 DIAGNOSIS — E78.5: ICD-10-CM

## 2019-09-03 DIAGNOSIS — E11.40: Primary | ICD-10-CM

## 2019-09-03 DIAGNOSIS — E55.9: ICD-10-CM

## 2019-09-03 LAB
25(OH)D3 SERPL-MCNC: 44.7 NG/ML (ref 30–100)
ALBUMIN SERPL-MCNC: 3.5 GM/DL (ref 3.1–4.5)
ALP SERPL-CCNC: 83 U/L (ref 45–117)
ALT SERPL W P-5'-P-CCNC: 43 U/L (ref 12–78)
APPEARANCE UR: CLEAR
AST SERPL-CCNC: 25 IU/L (ref 3–35)
BILIRUB UR QL STRIP: NEGATIVE
BUN SERPL-MCNC: 10 MG/DL (ref 7–24)
CHLORIDE SERPL-SCNC: 108 MMOL/L (ref 98–107)
CHOLEST SERPL-MCNC: 140 MG/DL (ref ?–200)
COLOR UR: YELLOW
CREAT SERPL-MCNC: 0.77 MG/DL (ref 0.7–1.3)
GLUCOSE UR QL: (no result)
HDLC SERPL-MCNC: 41 MG/DL (ref 40–60)
HGB UR QL STRIP: NEGATIVE
KETONES UR QL STRIP: NEGATIVE
LDLC SERPL DIRECT ASSAY-MCNC: 80 MG/DL (ref 9–159)
LEUKOCYTE ESTERASE UR QL STRIP: NEGATIVE
NITRITE UR QL STRIP: NEGATIVE
PH UR STRIP: 5 [PH] (ref 5–9)
POTASSIUM SERPL-SCNC: 4 MMOL/L (ref 3.5–5.1)
PROT SERPL-MCNC: 6.6 GM/DL (ref 6.4–8.2)
RBC #/AREA URNS HPF: (no result) RBC/HPF (ref 0–2)
SODIUM SERPL-SCNC: 139 MMOL/L (ref 136–145)
SP GR UR: 1.01 (ref 1–1.03)
TRIGL SERPL-MCNC: 95 MG/DL (ref ?–150)
UROBILINOGEN UR STRIP-MCNC: 0.2 E.U./DL (ref 0.2–1)
VITAMIN B12: 1328 PG/ML (ref 247–911)
VLDLC SERPL CALC-MCNC: 19 MG/DL (ref 6–40)
WBC #/AREA URNS HPF: (no result) WBC/HPF (ref 0–5)

## 2019-09-03 PROCEDURE — 3017F COLORECTAL CA SCREEN DOC REV: CPT | Performed by: INTERNAL MEDICINE

## 2019-09-03 PROCEDURE — 4004F PT TOBACCO SCREEN RCVD TLK: CPT | Performed by: INTERNAL MEDICINE

## 2019-09-03 PROCEDURE — 99214 OFFICE O/P EST MOD 30 MIN: CPT | Performed by: INTERNAL MEDICINE

## 2019-09-03 PROCEDURE — G8427 DOCREV CUR MEDS BY ELIG CLIN: HCPCS | Performed by: INTERNAL MEDICINE

## 2019-09-03 PROCEDURE — G8598 ASA/ANTIPLAT THER USED: HCPCS | Performed by: INTERNAL MEDICINE

## 2019-09-03 PROCEDURE — G8417 CALC BMI ABV UP PARAM F/U: HCPCS | Performed by: INTERNAL MEDICINE

## 2019-09-03 PROCEDURE — 93000 ELECTROCARDIOGRAM COMPLETE: CPT | Performed by: INTERNAL MEDICINE

## 2019-09-03 RX ORDER — ROSUVASTATIN CALCIUM 40 MG/1
1 TABLET, COATED ORAL DAILY
Refills: 1 | COMMUNITY
Start: 2019-08-23 | End: 2020-04-21

## 2019-09-03 RX ORDER — LORATADINE 10 MG/1
10 TABLET ORAL DAILY
Qty: 90 TABLET | Refills: 3 | Status: SHIPPED | OUTPATIENT
Start: 2019-09-03

## 2019-09-03 RX ORDER — FUROSEMIDE 20 MG/1
20 TABLET ORAL DAILY PRN
Qty: 30 TABLET | Refills: 11 | Status: SHIPPED | OUTPATIENT
Start: 2019-09-03

## 2019-09-03 RX ORDER — GLIMEPIRIDE 2 MG/1
1 TABLET ORAL 2 TIMES DAILY
Refills: 1 | COMMUNITY
Start: 2019-08-05

## 2019-09-03 RX ORDER — FLUTICASONE PROPIONATE 50 MCG
1 SPRAY, SUSPENSION (ML) NASAL DAILY
Qty: 1 BOTTLE | Refills: 3 | Status: SHIPPED | OUTPATIENT
Start: 2019-09-03

## 2019-09-03 RX ORDER — LISINOPRIL 2.5 MG/1
2.5 TABLET ORAL DAILY
Qty: 30 TABLET | Refills: 5 | Status: SHIPPED
Start: 2019-09-03 | End: 2020-04-02

## 2019-09-03 RX ORDER — POTASSIUM CHLORIDE 750 MG/1
10 TABLET, EXTENDED RELEASE ORAL DAILY PRN
Qty: 30 TABLET | Refills: 11 | Status: SHIPPED | OUTPATIENT
Start: 2019-09-03

## 2019-09-03 RX ORDER — NITROGLYCERIN 0.4 MG/1
0.4 TABLET SUBLINGUAL EVERY 5 MIN PRN
Qty: 25 TABLET | Refills: 3 | Status: SHIPPED | OUTPATIENT
Start: 2019-09-03

## 2019-09-03 NOTE — PROGRESS NOTES
CHIEF COMPLAINT: Chest pain/CAD    HISTORY OF PRESENT ILLNESS: Patient is a 66 y.o. male seen at the request of Richie Gómez DO. Patient presents in follow up. History of CAD. No CP or SOB. Past Medical History:   Diagnosis Date    CAD (coronary artery disease)     6 stents.  Details unavailible    DM (diabetes mellitus) ([facility]     HTN (hypertension)     Hyperlipemia     MI (myocardial infarction) ([facility] 2005/2010    stents 6       Patient Active Problem List   Diagnosis    HTN (hypertension)    Hyperlipemia    Type 2 diabetes mellitus, without long-term current use of insulin ([facility]    CAD (coronary artery disease)    Chest pain    Tobacco abuse    Noncompliance with medications       No Known Allergies    Current Outpatient Medications   Medication Sig Dispense Refill    glimepiride (AMARYL) 2 MG tablet Take 1 tablet by mouth 2 times daily  1    rosuvastatin (CRESTOR) 40 MG tablet Take 1 tablet by mouth daily  1    Coenzyme Q10 (CO Q-10) 100 MG CAPS Take 1 capsule by mouth daily  1    L-Methylfolate-B6-B12 (FOLTANX) 3-35-2 MG TABS Take 1 capsule by mouth daily  1    JARDIANCE 25 MG tablet Take 1 tablet by mouth daily  1    metoprolol succinate (TOPROL XL) 25 MG extended release tablet Take 0.5 tablets by mouth daily 30 tablet 3    lisinopril (PRINIVIL;ZESTRIL) 2.5 MG tablet Take 1 tablet by mouth daily 30 tablet 5    ticagrelor (BRILINTA) 90 MG TABS tablet Take 1 tablet by mouth 2 times daily 60 tablet 5    atorvastatin (LIPITOR) 80 MG tablet Take 80 mg by mouth nightly      ezetimibe (ZETIA) 10 MG tablet Take 10 mg by mouth daily      fluticasone (FLONASE) 50 MCG/ACT nasal spray 1 spray by Each Nare route daily      loratadine (CLARITIN) 10 MG tablet Take 1 tablet by mouth daily 90 tablet 3    VICTOZA 18 MG/3ML SOPN SC injection Inject into the skin daily       VENTOLIN  (90 BASE) MCG/ACT inhaler Inhale 1 puff into the lungs 4 times daily as needed       vitamin D

## 2019-10-18 ENCOUNTER — HOSPITAL ENCOUNTER (OUTPATIENT)
Dept: HOSPITAL 83 - US | Age: 59
Discharge: HOME | End: 2019-10-18
Attending: NURSE PRACTITIONER
Payer: COMMERCIAL

## 2019-10-18 DIAGNOSIS — E11.9: ICD-10-CM

## 2019-10-18 DIAGNOSIS — M19.012: ICD-10-CM

## 2019-10-18 DIAGNOSIS — I10: ICD-10-CM

## 2019-10-18 DIAGNOSIS — E78.5: ICD-10-CM

## 2019-10-18 DIAGNOSIS — N40.0: Primary | ICD-10-CM

## 2019-10-18 DIAGNOSIS — R94.5: ICD-10-CM

## 2020-01-28 ENCOUNTER — HOSPITAL ENCOUNTER (OUTPATIENT)
Dept: HOSPITAL 83 - LAB | Age: 60
Discharge: HOME | End: 2020-01-28
Attending: NURSE PRACTITIONER
Payer: COMMERCIAL

## 2020-01-28 DIAGNOSIS — E11.65: ICD-10-CM

## 2020-01-28 DIAGNOSIS — E29.1: ICD-10-CM

## 2020-01-28 DIAGNOSIS — N40.0: Primary | ICD-10-CM

## 2020-01-28 DIAGNOSIS — E11.40: ICD-10-CM

## 2020-01-28 DIAGNOSIS — E55.9: ICD-10-CM

## 2020-01-28 LAB
25(OH)D3 SERPL-MCNC: 38.7 NG/ML (ref 30–100)
ALBUMIN SERPL-MCNC: 3.9 GM/DL (ref 3.1–4.5)
ALP SERPL-CCNC: 83 U/L (ref 45–117)
ALT SERPL W P-5'-P-CCNC: 35 U/L (ref 12–78)
APPEARANCE UR: CLEAR
AST SERPL-CCNC: 17 IU/L (ref 3–35)
BACTERIA #/AREA URNS HPF: (no result) /[HPF]
BILIRUB UR QL STRIP: NEGATIVE
BUN SERPL-MCNC: 12 MG/DL (ref 7–24)
CHLORIDE SERPL-SCNC: 108 MMOL/L (ref 98–107)
CHOLEST SERPL-MCNC: 183 MG/DL (ref ?–200)
COLOR UR: YELLOW
CREAT SERPL-MCNC: 0.89 MG/DL (ref 0.7–1.3)
EPI CELLS #/AREA URNS HPF: (no result) /[HPF]
GLUCOSE UR QL: (no result)
HDLC SERPL-MCNC: 44 MG/DL (ref 40–60)
HGB UR QL STRIP: NEGATIVE
KETONES UR QL STRIP: (no result)
LDLC SERPL DIRECT ASSAY-MCNC: 105 MG/DL (ref 9–159)
LEUKOCYTE ESTERASE UR QL STRIP: NEGATIVE
MUCOUS THREADS URNS QL MICRO: (no result)
NITRITE UR QL STRIP: NEGATIVE
PH UR STRIP: 6 [PH] (ref 5–9)
POTASSIUM SERPL-SCNC: 4.3 MMOL/L (ref 3.5–5.1)
PROT SERPL-MCNC: 7 GM/DL (ref 6.4–8.2)
RBC #/AREA URNS HPF: (no result) RBC/HPF (ref 0–2)
SODIUM SERPL-SCNC: 139 MMOL/L (ref 136–145)
SP GR UR: 1.03 (ref 1–1.03)
TESTOST SERPL-MCNC: 429 NG/DL (ref 241–827)
TRIGL SERPL-MCNC: 169 MG/DL (ref ?–150)
UROBILINOGEN UR STRIP-MCNC: 0.2 E.U./DL (ref 0.2–1)
VITAMIN B12: 965 PG/ML (ref 247–911)
VLDLC SERPL CALC-MCNC: 34 MG/DL (ref 6–40)
WBC #/AREA URNS HPF: (no result) WBC/HPF (ref 0–5)

## 2020-02-07 RX ORDER — METOPROLOL SUCCINATE 25 MG/1
12.5 TABLET, EXTENDED RELEASE ORAL DAILY
Qty: 30 TABLET | Refills: 3 | Status: SHIPPED | OUTPATIENT
Start: 2020-02-07

## 2020-04-02 RX ORDER — LISINOPRIL 2.5 MG/1
TABLET ORAL
Qty: 30 TABLET | Refills: 0 | Status: SHIPPED
Start: 2020-04-02 | End: 2020-04-21 | Stop reason: SDUPTHER

## 2020-04-17 RX ORDER — FERROUS SULFATE 325(65) MG
1 TABLET ORAL DAILY
COMMUNITY
Start: 2020-02-04 | End: 2020-04-17 | Stop reason: CLARIF

## 2020-04-20 RX ORDER — SEMAGLUTIDE 1.34 MG/ML
1 INJECTION, SOLUTION SUBCUTANEOUS WEEKLY
COMMUNITY
Start: 2020-01-31

## 2020-04-20 RX ORDER — DEXTROAMPHETAMINE SACCHARATE, AMPHETAMINE ASPARTATE, DEXTROAMPHETAMINE SULFATE AND AMPHETAMINE SULFATE 2.5; 2.5; 2.5; 2.5 MG/1; MG/1; MG/1; MG/1
10 TABLET ORAL DAILY
COMMUNITY

## 2020-04-21 ENCOUNTER — VIRTUAL VISIT (OUTPATIENT)
Dept: CARDIOLOGY CLINIC | Age: 60
End: 2020-04-21
Payer: MEDICAID

## 2020-04-21 VITALS — HEIGHT: 69 IN | BODY MASS INDEX: 30.21 KG/M2 | WEIGHT: 204 LBS

## 2020-04-21 PROCEDURE — 99442 PR PHYS/QHP TELEPHONE EVALUATION 11-20 MIN: CPT | Performed by: INTERNAL MEDICINE

## 2020-04-21 RX ORDER — LISINOPRIL 2.5 MG/1
2.5 TABLET ORAL DAILY
Qty: 90 TABLET | Refills: 3 | Status: SHIPPED | OUTPATIENT
Start: 2020-04-21

## 2020-04-21 NOTE — PROGRESS NOTES
Q-10) 100 MG CAPS Take 1 capsule by mouth daily  1    L-Methylfolate-B6-B12 (FOLTANX) 3-35-2 MG TABS Take 1 capsule by mouth daily  1    JARDIANCE 25 MG tablet Take 1 tablet by mouth daily  1    atorvastatin (LIPITOR) 80 MG tablet Take 80 mg by mouth nightly      ezetimibe (ZETIA) 10 MG tablet Take 10 mg by mouth daily      vitamin D (ERGOCALCIFEROL) 80244 UNITS CAPS capsule Take 5,000 Units by mouth daily       metFORMIN (GLUCOPHAGE) 1000 MG tablet Take 1,000 mg by mouth 2 times daily (with meals)       aspirin EC 81 MG EC tablet Take 81 mg by mouth daily      VICTOZA 18 MG/3ML SOPN SC injection Inject into the skin daily        No current facility-administered medications for this visit.         Social History     Socioeconomic History    Marital status:      Spouse name: Not on file    Number of children: Not on file    Years of education: Not on file    Highest education level: Not on file   Occupational History    Occupation: [employer]     Employer: [employer]TIME   Social Needs    Financial resource strain: Not on file    Food insecurity     Worry: Not on file     Inability: Not on file    Transportation needs     Medical: Not on file     Non-medical: Not on file   Tobacco Use    Smoking status: Current Every Day Smoker     Packs/day: 1.50     Types: Cigarettes    Smokeless tobacco: Never Used   Substance and Sexual Activity    Alcohol use: No    Drug use: No    Sexual activity: Not on file   Lifestyle    Physical activity     Days per week: Not on file     Minutes per session: Not on file    Stress: Not on file   Relationships    Social connections     Talks on phone: Not on file     Gets together: Not on file     Attends Mosque service: Not on file     Active member of club or organization: Not on file     Attends meetings of clubs or organizations: Not on file     Relationship status: Not on file    Intimate partner violence     Fear of current or ex partner: Not on file Emotionally abused: Not on file     Physically abused: Not on file     Forced sexual activity: Not on file   Other Topics Concern    Not on file   Social History Narrative    Not on file       Family History   Problem Relation Age of Onset    Diabetes Father     Cancer Father         leukemia       Review of Systems:  Heart: as above   Lungs: as above   Eyes: denies changes in vision or discharge. Ears: denies changes in hearing or pain. Nose: denies epistaxis or masses   Throat: denies sore throat or trouble swallowing. Neuro: denies numbness, tingling, tremors. Skin: denies rashes or itching. : denies hematuria, dysuria   GI: denies vomiting, diarrhea   Psych: denies mood changed, anxiety, depression. All other systems negative. Physical Exam   Ht 5' 9\" (1.753 m)   Wt 204 lb (92.5 kg)   BMI 30.13 kg/m²     Telephone call due to COVID 19.     Echo Summary 5/3/17: LVEF 60% with trace MR.    ASSESSMENT AND PLAN:  Patient Active Problem List   Diagnosis    HTN (hypertension)    Hyperlipemia    Type 2 diabetes mellitus, without long-term current use of insulin (HCC)    CAD (coronary artery disease)    Chest pain    Tobacco abuse    Noncompliance with medications     1. Chest pain/Hx of CAD with multiple stents:    PCI to LAD 10/28/18 with ALFREDO x 2. Continue ASA/statin/BB. 2. HTN: Controlled. 3. Lipids: Statin. 4. DM: Per PCP. Benji Au D.O. Cardiologist  Cardiology, [facility]    Sindy Harris is a 64 y.o. male evaluated via telephone on 4/21/2020. Consent:  He and/or health care decision maker is aware that that he may receive a bill for this telephone service, depending on his insurance coverage, and has provided verbal consent to proceed: Yes      Documentation:  I communicated with the patient and/or health care decision maker about there cardiovascular issues, see above documentation.     I affirm this is a Patient Initiated Episode with

## 2020-06-18 ENCOUNTER — HOSPITAL ENCOUNTER (OUTPATIENT)
Dept: HOSPITAL 83 - LAB | Age: 60
Discharge: HOME | End: 2020-06-18
Attending: INTERNAL MEDICINE
Payer: COMMERCIAL

## 2020-06-18 DIAGNOSIS — E11.40: ICD-10-CM

## 2020-06-18 DIAGNOSIS — E11.65: Primary | ICD-10-CM

## 2020-06-18 DIAGNOSIS — E78.5: ICD-10-CM

## 2020-06-18 DIAGNOSIS — E55.9: ICD-10-CM

## 2020-06-18 LAB
25(OH)D3 SERPL-MCNC: 51.2 NG/ML (ref 30–100)
ALBUMIN SERPL-MCNC: 3.4 GM/DL (ref 3.1–4.5)
ALP SERPL-CCNC: 75 U/L (ref 45–117)
ALT SERPL W P-5'-P-CCNC: 27 U/L (ref 12–78)
APPEARANCE UR: CLEAR
AST SERPL-CCNC: 18 IU/L (ref 3–35)
BACTERIA #/AREA URNS HPF: (no result) /[HPF]
BILIRUB UR QL STRIP: NEGATIVE
BUN SERPL-MCNC: 10 MG/DL (ref 7–24)
CHLORIDE SERPL-SCNC: 109 MMOL/L (ref 98–107)
CHOLEST SERPL-MCNC: 202 MG/DL (ref ?–200)
COLOR UR: YELLOW
CREAT SERPL-MCNC: 0.83 MG/DL (ref 0.7–1.3)
GLUCOSE UR QL: (no result)
HDLC SERPL-MCNC: 41 MG/DL (ref 40–60)
HGB UR QL STRIP: NEGATIVE
KETONES UR QL STRIP: NEGATIVE
LDLC SERPL DIRECT ASSAY-MCNC: 142 MG/DL (ref 9–159)
LEUKOCYTE ESTERASE UR QL STRIP: NEGATIVE
MUCOUS THREADS URNS QL MICRO: (no result)
NITRITE UR QL STRIP: NEGATIVE
PH UR STRIP: 6 [PH] (ref 5–9)
POTASSIUM SERPL-SCNC: 4.4 MMOL/L (ref 3.5–5.1)
PROT SERPL-MCNC: 6.6 GM/DL (ref 6.4–8.2)
RBC #/AREA URNS HPF: (no result) RBC/HPF (ref 0–2)
SODIUM SERPL-SCNC: 140 MMOL/L (ref 136–145)
SP GR UR: 1.02 (ref 1–1.03)
TRIGL SERPL-MCNC: 97 MG/DL (ref ?–150)
UROBILINOGEN UR STRIP-MCNC: 0.2 E.U./DL (ref 0.2–1)
VITAMIN B12: 817 PG/ML (ref 247–911)
VLDLC SERPL CALC-MCNC: 19 MG/DL (ref 6–40)

## 2020-08-31 ENCOUNTER — HOSPITAL ENCOUNTER (INPATIENT)
Dept: HOSPITAL 83 - ED | Age: 60
LOS: 3 days | Discharge: HOME | DRG: 135 | End: 2020-09-03
Attending: STUDENT IN AN ORGANIZED HEALTH CARE EDUCATION/TRAINING PROGRAM | Admitting: STUDENT IN AN ORGANIZED HEALTH CARE EDUCATION/TRAINING PROGRAM
Payer: COMMERCIAL

## 2020-08-31 VITALS — DIASTOLIC BLOOD PRESSURE: 65 MMHG | SYSTOLIC BLOOD PRESSURE: 114 MMHG

## 2020-08-31 VITALS — WEIGHT: 189.9 LBS | BODY MASS INDEX: 28.78 KG/M2 | HEIGHT: 67.99 IN

## 2020-08-31 VITALS — DIASTOLIC BLOOD PRESSURE: 73 MMHG

## 2020-08-31 VITALS — DIASTOLIC BLOOD PRESSURE: 76 MMHG

## 2020-08-31 DIAGNOSIS — F17.210: ICD-10-CM

## 2020-08-31 DIAGNOSIS — E78.5: ICD-10-CM

## 2020-08-31 DIAGNOSIS — J44.9: ICD-10-CM

## 2020-08-31 DIAGNOSIS — E44.0: ICD-10-CM
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
## 2020-08-31 DIAGNOSIS — F32.9: ICD-10-CM

## 2020-08-31 DIAGNOSIS — I25.10: ICD-10-CM

## 2020-08-31 DIAGNOSIS — Z71.6: ICD-10-CM

## 2020-08-31 DIAGNOSIS — Z83.3: ICD-10-CM

## 2020-08-31 DIAGNOSIS — M17.12: ICD-10-CM

## 2020-08-31 DIAGNOSIS — Z79.899: ICD-10-CM
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
## 2020-08-31 DIAGNOSIS — E11.69: ICD-10-CM
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
## 2020-08-31 DIAGNOSIS — S22.43XA: Primary | ICD-10-CM

## 2020-08-31 DIAGNOSIS — Z79.84: ICD-10-CM

## 2020-08-31 DIAGNOSIS — S82.142A: ICD-10-CM

## 2020-08-31 DIAGNOSIS — I25.2: ICD-10-CM

## 2020-08-31 DIAGNOSIS — Z79.82: ICD-10-CM

## 2020-08-31 DIAGNOSIS — I10: ICD-10-CM

## 2020-09-01 VITALS — SYSTOLIC BLOOD PRESSURE: 124 MMHG | DIASTOLIC BLOOD PRESSURE: 74 MMHG

## 2020-09-01 VITALS — SYSTOLIC BLOOD PRESSURE: 126 MMHG | DIASTOLIC BLOOD PRESSURE: 77 MMHG

## 2020-09-01 VITALS — DIASTOLIC BLOOD PRESSURE: 72 MMHG

## 2020-09-01 VITALS — DIASTOLIC BLOOD PRESSURE: 77 MMHG | SYSTOLIC BLOOD PRESSURE: 121 MMHG

## 2020-09-01 VITALS — SYSTOLIC BLOOD PRESSURE: 122 MMHG | DIASTOLIC BLOOD PRESSURE: 78 MMHG

## 2020-09-01 LAB
ALBUMIN SERPL-MCNC: 2.8 GM/DL (ref 3.1–4.5)
ALP SERPL-CCNC: 73 U/L (ref 45–117)
ALT SERPL W P-5'-P-CCNC: 25 U/L (ref 12–78)
APTT PPP: 32 SECONDS (ref 20–32.1)
AST SERPL-CCNC: 18 IU/L (ref 3–35)
BASOPHILS # BLD AUTO: 3 % (ref 0–1)
BUN SERPL-MCNC: 13 MG/DL (ref 7–24)
CHLORIDE SERPL-SCNC: 105 MMOL/L (ref 98–107)
CREAT SERPL-MCNC: 0.81 MG/DL (ref 0.7–1.3)
ERYTHROCYTE [DISTWIDTH] IN BLOOD BY AUTOMATED COUNT: 13.4 % (ref 0–14.5)
HCT VFR BLD AUTO: 45.9 % (ref 42–52)
INR BLD: 1 (ref 2–3.5)
MCH RBC QN AUTO: 29.7 PG (ref 27–31)
MCHC RBC AUTO-ENTMCNC: 32.2 G/DL (ref 33–37)
MCV RBC AUTO: 92 FL (ref 80–94)
NRBC BLD QL AUTO: 0 10*3/UL (ref 0–0)
PLATELET # BLD AUTO: 159 10*3/UL (ref 130–400)
PLATELET SUFFICIENCY: NORMAL
PMV BLD AUTO: 10.5 FL (ref 9.6–12.3)
POTASSIUM SERPL-SCNC: 3.9 MMOL/L (ref 3.5–5.1)
PROT SERPL-MCNC: 6.5 GM/DL (ref 6.4–8.2)
RBC # BLD AUTO: 4.99 10*6/UL (ref 4.5–5.9)
RBC MORPH BLD: NORMAL
SODIUM SERPL-SCNC: 135 MMOL/L (ref 136–145)
T4 FREE SERPL-MCNC: 1.13 NG/DL (ref 0.76–1.46)
TOTAL CELLS COUNTED: 100 #CELLS
TSH SERPL DL<=0.005 MIU/L-ACNC: 1.81 UIU/ML (ref 0.36–4.75)
WBC NRBC COR # BLD AUTO: 10.1 10*3/UL (ref 4.8–10.8)

## 2020-09-02 VITALS — DIASTOLIC BLOOD PRESSURE: 75 MMHG

## 2020-09-02 VITALS — SYSTOLIC BLOOD PRESSURE: 126 MMHG | DIASTOLIC BLOOD PRESSURE: 80 MMHG

## 2020-09-02 VITALS — DIASTOLIC BLOOD PRESSURE: 73 MMHG

## 2020-09-02 VITALS — DIASTOLIC BLOOD PRESSURE: 74 MMHG

## 2020-09-03 VITALS — SYSTOLIC BLOOD PRESSURE: 108 MMHG | DIASTOLIC BLOOD PRESSURE: 74 MMHG

## 2020-09-03 VITALS — DIASTOLIC BLOOD PRESSURE: 73 MMHG | SYSTOLIC BLOOD PRESSURE: 119 MMHG

## 2020-09-03 VITALS — DIASTOLIC BLOOD PRESSURE: 77 MMHG

## 2020-09-09 ENCOUNTER — HOSPITAL ENCOUNTER (OUTPATIENT)
Dept: HOSPITAL 83 - ORTHO | Age: 60
Discharge: HOME | End: 2020-09-09
Attending: ORTHOPAEDIC SURGERY
Payer: COMMERCIAL
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
## 2020-09-09 DIAGNOSIS — S82.142A: Primary | ICD-10-CM
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
## 2020-09-09 DIAGNOSIS — M25.462: ICD-10-CM

## 2020-09-23 ENCOUNTER — HOSPITAL ENCOUNTER (OUTPATIENT)
Dept: HOSPITAL 83 - ORTHO | Age: 60
Discharge: HOME | End: 2020-09-23
Attending: ORTHOPAEDIC SURGERY
Payer: COMMERCIAL
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
## 2020-09-23 DIAGNOSIS — S82.145A: Primary | ICD-10-CM

## 2020-09-29 ENCOUNTER — HOSPITAL ENCOUNTER (OUTPATIENT)
Dept: HOSPITAL 83 - RESCLI | Age: 60
Discharge: HOME | End: 2020-09-29
Attending: STUDENT IN AN ORGANIZED HEALTH CARE EDUCATION/TRAINING PROGRAM
Payer: COMMERCIAL

## 2020-09-29 DIAGNOSIS — Z98.890: ICD-10-CM

## 2020-09-29 DIAGNOSIS — F32.9: ICD-10-CM

## 2020-09-29 DIAGNOSIS — I25.10: ICD-10-CM

## 2020-09-29 DIAGNOSIS — Z71.6: ICD-10-CM

## 2020-09-29 DIAGNOSIS — R05: ICD-10-CM

## 2020-09-29 DIAGNOSIS — E55.9: ICD-10-CM

## 2020-09-29 DIAGNOSIS — R60.9: ICD-10-CM

## 2020-09-29 DIAGNOSIS — I25.2: ICD-10-CM

## 2020-09-29 DIAGNOSIS — E78.5: ICD-10-CM

## 2020-09-29 DIAGNOSIS — S82.142D: ICD-10-CM

## 2020-09-29 DIAGNOSIS — I10: ICD-10-CM

## 2020-09-29 DIAGNOSIS — Z79.899: ICD-10-CM

## 2020-09-29 DIAGNOSIS — J44.9: ICD-10-CM

## 2020-09-29 DIAGNOSIS — E11.8: Primary | ICD-10-CM
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
## 2020-10-08 ENCOUNTER — HOSPITAL ENCOUNTER (OUTPATIENT)
Dept: HOSPITAL 83 - CARD | Age: 60
Discharge: HOME | End: 2020-10-08
Attending: INTERNAL MEDICINE
Payer: COMMERCIAL

## 2020-10-08 DIAGNOSIS — R60.9: Primary | ICD-10-CM

## 2020-10-14 ENCOUNTER — HOSPITAL ENCOUNTER (OUTPATIENT)
Dept: HOSPITAL 83 - ORTHO | Age: 60
Discharge: HOME | End: 2020-10-14
Attending: ORTHOPAEDIC SURGERY
Payer: COMMERCIAL

## 2020-10-14 DIAGNOSIS — M25.462: ICD-10-CM
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
## 2020-10-14 DIAGNOSIS — M17.12: ICD-10-CM

## 2020-10-14 DIAGNOSIS — S82.142D: Primary | ICD-10-CM

## 2020-11-03 ENCOUNTER — HOSPITAL ENCOUNTER (OUTPATIENT)
Dept: HOSPITAL 83 - LAB | Age: 60
Discharge: HOME | End: 2020-11-03
Attending: INTERNAL MEDICINE
Payer: COMMERCIAL

## 2020-11-03 DIAGNOSIS — E11.65: ICD-10-CM

## 2020-11-03 DIAGNOSIS — E55.9: ICD-10-CM

## 2020-11-03 DIAGNOSIS — E11.40: Primary | ICD-10-CM

## 2020-11-03 DIAGNOSIS — N40.0: ICD-10-CM

## 2020-11-03 DIAGNOSIS — E78.5: ICD-10-CM

## 2020-11-03 LAB
25(OH)D3 SERPL-MCNC: 36 NG/ML (ref 30–100)
ALBUMIN SERPL-MCNC: 3.1 GM/DL (ref 3.1–4.5)
ALP SERPL-CCNC: 102 U/L (ref 45–117)
ALT SERPL W P-5'-P-CCNC: 24 U/L (ref 12–78)
AST SERPL-CCNC: 13 IU/L (ref 3–35)
BUN SERPL-MCNC: 12 MG/DL (ref 7–24)
CHLORIDE SERPL-SCNC: 104 MMOL/L (ref 98–107)
CHOLEST SERPL-MCNC: 276 MG/DL (ref ?–200)
CREAT SERPL-MCNC: 0.78 MG/DL (ref 0.7–1.3)
GLUCOSE UR QL: (no result)
HDLC SERPL-MCNC: 44 MG/DL (ref 40–60)
LDLC SERPL DIRECT ASSAY-MCNC: 204 MG/DL (ref 9–159)
PH UR STRIP: 5 [PH] (ref 4.5–8)
POTASSIUM SERPL-SCNC: 4.1 MMOL/L (ref 3.5–5.1)
PROT SERPL-MCNC: 6.6 GM/DL (ref 6.4–8.2)
RBC #/AREA URNS HPF: (no result) RBC/HPF (ref 0–2)
SODIUM SERPL-SCNC: 139 MMOL/L (ref 136–145)
SP GR UR: 1.01 (ref 1–1.03)
TESTOST SERPL-MCNC: 410 NG/DL (ref 241–827)
TRIGL SERPL-MCNC: 139 MG/DL (ref ?–150)
UROBILINOGEN UR STRIP-MCNC: 0.2 E.U./DL (ref 0–1)
VITAMIN B12: 552 PG/ML (ref 247–911)
VLDLC SERPL CALC-MCNC: 28 MG/DL (ref 6–40)

## 2020-11-25 ENCOUNTER — HOSPITAL ENCOUNTER (OUTPATIENT)
Dept: HOSPITAL 83 - ORTHO | Age: 60
Discharge: HOME | End: 2020-11-25
Attending: ORTHOPAEDIC SURGERY
Payer: COMMERCIAL
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
## 2020-11-25 DIAGNOSIS — M25.462: Primary | ICD-10-CM

## 2020-11-25 DIAGNOSIS — S82.142D: ICD-10-CM

## 2021-02-03 ENCOUNTER — HOSPITAL ENCOUNTER (OUTPATIENT)
Dept: HOSPITAL 83 - LAB | Age: 61
Discharge: HOME | End: 2021-02-03
Attending: INTERNAL MEDICINE
Payer: COMMERCIAL

## 2021-02-03 DIAGNOSIS — E29.1: ICD-10-CM

## 2021-02-03 DIAGNOSIS — E11.40: ICD-10-CM

## 2021-02-03 DIAGNOSIS — E11.65: Primary | ICD-10-CM

## 2021-02-03 DIAGNOSIS — E55.9: ICD-10-CM

## 2021-02-03 LAB
25(OH)D3 SERPL-MCNC: 22.8 NG/ML (ref 30–100)
ALBUMIN SERPL-MCNC: 3.5 GM/DL (ref 3.1–4.5)
ALP SERPL-CCNC: 111 U/L (ref 45–117)
ALT SERPL W P-5'-P-CCNC: 36 U/L (ref 12–78)
AST SERPL-CCNC: 21 IU/L (ref 3–35)
BUN SERPL-MCNC: 14 MG/DL (ref 7–24)
CHLORIDE SERPL-SCNC: 105 MMOL/L (ref 98–107)
CHOLEST SERPL-MCNC: 293 MG/DL (ref ?–200)
CREAT SERPL-MCNC: 0.86 MG/DL (ref 0.7–1.3)
GLUCOSE UR QL: (no result)
HDLC SERPL-MCNC: 46 MG/DL (ref 40–60)
LDLC SERPL DIRECT ASSAY-MCNC: 212 MG/DL (ref 9–159)
PH UR STRIP: 5 [PH] (ref 4.5–8)
POTASSIUM SERPL-SCNC: 4.3 MMOL/L (ref 3.5–5.1)
PROT SERPL-MCNC: 7.1 GM/DL (ref 6.4–8.2)
RBC #/AREA URNS HPF: (no result) RBC/HPF (ref 0–2)
SODIUM SERPL-SCNC: 137 MMOL/L (ref 136–145)
SP GR UR: 1.02 (ref 1–1.03)
TESTOST SERPL-MCNC: 434 NG/DL (ref 241–827)
TRIGL SERPL-MCNC: 177 MG/DL (ref ?–150)
UROBILINOGEN UR STRIP-MCNC: 0.2 E.U./DL (ref 0–1)
VITAMIN B12: 565 PG/ML (ref 247–911)
VLDLC SERPL CALC-MCNC: 35 MG/DL (ref 6–40)
WBC #/AREA URNS HPF: (no result) WBC/HPF (ref 0–5)

## 2021-05-06 ENCOUNTER — HOSPITAL ENCOUNTER (OUTPATIENT)
Dept: HOSPITAL 83 - LAB | Age: 61
Discharge: HOME | End: 2021-05-06
Attending: INTERNAL MEDICINE
Payer: COMMERCIAL

## 2021-05-06 DIAGNOSIS — E78.5: ICD-10-CM

## 2021-05-06 DIAGNOSIS — E55.9: ICD-10-CM

## 2021-05-06 DIAGNOSIS — E11.40: ICD-10-CM

## 2021-05-06 DIAGNOSIS — N40.0: ICD-10-CM

## 2021-05-06 DIAGNOSIS — E11.65: Primary | ICD-10-CM

## 2021-05-06 LAB
25(OH)D3 SERPL-MCNC: 54 NG/ML (ref 30–100)
ALBUMIN SERPL-MCNC: 3.7 GM/DL (ref 3.1–4.5)
ALP SERPL-CCNC: 103 U/L (ref 45–117)
ALT SERPL W P-5'-P-CCNC: 33 U/L (ref 12–78)
AST SERPL-CCNC: 15 IU/L (ref 3–35)
BACTERIA #/AREA URNS HPF: (no result) /[HPF]
BUN SERPL-MCNC: 13 MG/DL (ref 7–24)
CHLORIDE SERPL-SCNC: 107 MMOL/L (ref 98–107)
CHOLEST SERPL-MCNC: 96 MG/DL (ref ?–200)
CREAT SERPL-MCNC: 0.84 MG/DL (ref 0.7–1.3)
EPI CELLS #/AREA URNS HPF: (no result) /[HPF]
GLUCOSE UR QL: (no result)
LDLC SERPL DIRECT ASSAY-MCNC: 31 MG/DL (ref 9–159)
PH UR STRIP: 5 [PH] (ref 4.5–8)
POTASSIUM SERPL-SCNC: 4.2 MMOL/L (ref 3.5–5.1)
PROT SERPL-MCNC: 7.1 GM/DL (ref 6.4–8.2)
RBC #/AREA URNS HPF: (no result) RBC/HPF (ref 0–2)
SODIUM SERPL-SCNC: 139 MMOL/L (ref 136–145)
SP GR UR: >= 1.03 (ref 1–1.03)
TESTOST SERPL-MCNC: 405 NG/DL (ref 241–827)
TRIGL SERPL-MCNC: 76 MG/DL (ref ?–150)
UROBILINOGEN UR STRIP-MCNC: 0.2 E.U./DL (ref 0–1)
VITAMIN B12: 584 PG/ML (ref 247–911)
WBC #/AREA URNS HPF: (no result) WBC/HPF (ref 0–5)

## 2021-10-27 ENCOUNTER — HOSPITAL ENCOUNTER (OUTPATIENT)
Dept: HOSPITAL 83 - LAB | Age: 61
Discharge: HOME | End: 2021-10-27
Attending: INTERNAL MEDICINE
Payer: COMMERCIAL

## 2021-10-27 DIAGNOSIS — E55.9: ICD-10-CM

## 2021-10-27 DIAGNOSIS — E78.5: Primary | ICD-10-CM

## 2021-10-27 DIAGNOSIS — E29.1: ICD-10-CM

## 2021-10-27 DIAGNOSIS — E11.65: ICD-10-CM

## 2021-10-27 DIAGNOSIS — N40.0: ICD-10-CM

## 2021-10-27 LAB
25(OH)D3 SERPL-MCNC: 43.1 NG/ML (ref 30–100)
ALBUMIN SERPL-MCNC: 3.3 GM/DL (ref 3.1–4.5)
ALP SERPL-CCNC: 76 U/L (ref 45–117)
ALT SERPL W P-5'-P-CCNC: 25 U/L (ref 12–78)
AST SERPL-CCNC: 14 IU/L (ref 3–35)
BUN SERPL-MCNC: 11 MG/DL (ref 7–24)
CHLORIDE SERPL-SCNC: 107 MMOL/L (ref 98–107)
CHOLEST SERPL-MCNC: 148 MG/DL (ref ?–200)
CREAT SERPL-MCNC: 0.73 MG/DL (ref 0.7–1.3)
EPI CELLS #/AREA URNS HPF: (no result) /[HPF]
LDLC SERPL DIRECT ASSAY-MCNC: 79 MG/DL (ref 9–159)
PH UR STRIP: 5 [PH] (ref 4.5–8)
POTASSIUM SERPL-SCNC: 4.4 MMOL/L (ref 3.5–5.1)
PROT SERPL-MCNC: 6.7 GM/DL (ref 6.4–8.2)
RBC #/AREA URNS HPF: (no result) RBC/HPF (ref 0–2)
SODIUM SERPL-SCNC: 140 MMOL/L (ref 136–145)
SP GR UR: 1.02 (ref 1–1.03)
TRIGL SERPL-MCNC: 79 MG/DL (ref ?–150)
UROBILINOGEN UR STRIP-MCNC: 0.2 E.U./DL (ref 0–1)
VITAMIN B12: 709 PG/ML (ref 247–911)
WBC #/AREA URNS HPF: (no result) WBC/HPF (ref 0–5)

## 2022-03-25 ENCOUNTER — HOSPITAL ENCOUNTER (OUTPATIENT)
Dept: HOSPITAL 83 - LAB | Age: 62
Discharge: HOME | End: 2022-03-25
Attending: INTERNAL MEDICINE
Payer: COMMERCIAL

## 2022-03-25 DIAGNOSIS — E11.40: ICD-10-CM

## 2022-03-25 DIAGNOSIS — E29.1: ICD-10-CM

## 2022-03-25 DIAGNOSIS — N40.0: ICD-10-CM

## 2022-03-25 DIAGNOSIS — E55.9: ICD-10-CM

## 2022-03-25 DIAGNOSIS — E11.65: Primary | ICD-10-CM

## 2022-03-25 LAB
25(OH)D3 SERPL-MCNC: 31.5 NG/ML (ref 30–100)
ALP SERPL-CCNC: 82 U/L (ref 45–117)
ALT SERPL W P-5'-P-CCNC: 33 U/L (ref 12–78)
AST SERPL-CCNC: 19 IU/L (ref 3–35)
BUN SERPL-MCNC: 10 MG/DL (ref 7–24)
CHLORIDE SERPL-SCNC: 106 MMOL/L (ref 98–107)
CHOLEST SERPL-MCNC: 158 MG/DL (ref ?–200)
CREAT SERPL-MCNC: 0.84 MG/DL (ref 0.7–1.3)
LDLC SERPL DIRECT ASSAY-MCNC: 95 MG/DL (ref 9–159)
MUCOUS THREADS URNS QL MICRO: (no result)
PH UR STRIP: 6 [PH] (ref 4.5–8)
POTASSIUM SERPL-SCNC: 4.7 MMOL/L (ref 3.5–5.1)
PROT SERPL-MCNC: 6.8 GM/DL (ref 6.4–8.2)
RBC #/AREA URNS HPF: (no result) RBC/HPF (ref 0–2)
SODIUM SERPL-SCNC: 138 MMOL/L (ref 136–145)
SP GR UR: 1.01 (ref 1–1.03)
TESTOST SERPL-MCNC: 439 NG/DL (ref 241–827)
TRIGL SERPL-MCNC: 88 MG/DL (ref ?–150)
UROBILINOGEN UR STRIP-MCNC: 1 E.U./DL (ref 0–1)
VITAMIN B12: 553 PG/ML (ref 247–911)
WBC #/AREA URNS HPF: (no result) WBC/HPF (ref 0–5)

## 2022-09-26 NOTE — PROGRESS NOTES
Patient's right radial TR band removed with no complications. Patient's radial pulse remains strong and patient has no complaints of numbness or pain. Op-site dressing placed.  Chris Biggs  10/28/2018 No

## 2022-12-09 ENCOUNTER — HOSPITAL ENCOUNTER (OUTPATIENT)
Dept: HOSPITAL 83 - LAB | Age: 62
Discharge: HOME | End: 2022-12-09
Attending: INTERNAL MEDICINE
Payer: COMMERCIAL

## 2022-12-09 DIAGNOSIS — E11.65: ICD-10-CM

## 2022-12-09 DIAGNOSIS — N40.0: Primary | ICD-10-CM

## 2022-12-09 DIAGNOSIS — E55.9: ICD-10-CM

## 2022-12-09 DIAGNOSIS — E11.40: ICD-10-CM

## 2022-12-09 DIAGNOSIS — E78.5: ICD-10-CM

## 2022-12-09 LAB
25(OH)D3 SERPL-MCNC: 50 NG/ML (ref 30–100)
ALP SERPL-CCNC: 82 U/L (ref 46–116)
ALT SERPL W P-5'-P-CCNC: 24 U/L (ref 10–49)
BUN SERPL-MCNC: 11 MG/DL (ref 9–23)
CHLORIDE SERPL-SCNC: 105 MMOL/L (ref 98–107)
CHOLEST SERPL-MCNC: 192 MG/DL (ref ?–200)
CREAT SERPL-MCNC: 0.77 MG/DL (ref 0.7–1.3)
EPI CELLS #/AREA URNS HPF: (no result) /[HPF]
GLUCOSE UR QL: (no result)
LDLC SERPL DIRECT ASSAY-MCNC: 121 MG/DL (ref 9–159)
MUCOUS THREADS URNS QL MICRO: (no result)
PH UR STRIP: 5.5 [PH] (ref 4.5–8)
POTASSIUM SERPL-SCNC: 4.1 MMOL/L (ref 3.4–5.1)
PROT SERPL-MCNC: 6.6 GM/DL (ref 6–8)
RBC #/AREA URNS HPF: (no result) RBC/HPF (ref 0–2)
SODIUM SERPL-SCNC: 138 MMOL/L (ref 136–145)
SP GR UR: >= 1.03 (ref 1–1.03)
TESTOST SERPL-MCNC: 365 NG/DL (ref 113–882)
TRIGL SERPL-MCNC: 151 MG/DL (ref ?–150)
UROBILINOGEN UR STRIP-MCNC: 0.2 E.U./DL (ref 0–1)
VITAMIN B12: 652 PG/ML (ref 211–911)
WBC #/AREA URNS HPF: (no result) WBC/HPF (ref 0–5)

## 2023-08-18 ENCOUNTER — HOSPITAL ENCOUNTER (OUTPATIENT)
Dept: HOSPITAL 83 - LAB | Age: 63
Discharge: HOME | End: 2023-08-18
Attending: INTERNAL MEDICINE
Payer: COMMERCIAL

## 2023-08-18 DIAGNOSIS — E11.40: ICD-10-CM

## 2023-08-18 DIAGNOSIS — E29.1: Primary | ICD-10-CM

## 2023-08-18 DIAGNOSIS — E11.65: ICD-10-CM

## 2023-08-18 DIAGNOSIS — E78.5: ICD-10-CM

## 2023-08-18 DIAGNOSIS — N40.0: ICD-10-CM

## 2023-08-18 DIAGNOSIS — E55.9: ICD-10-CM

## 2023-08-18 LAB
25(OH)D3 SERPL-MCNC: 36.7 NG/ML (ref 30–100)
ALP SERPL-CCNC: 98 U/L (ref 46–116)
ALT SERPL W P-5'-P-CCNC: 20 U/L (ref 10–49)
BUN SERPL-MCNC: 10 MG/DL (ref 9–23)
CHLORIDE SERPL-SCNC: 100 MMOL/L (ref 98–107)
CHOLEST SERPL-MCNC: 240 MG/DL (ref ?–200)
GLUCOSE UR QL: (no result)
LDLC SERPL DIRECT ASSAY-MCNC: 160 MG/DL (ref 9–159)
PH UR STRIP: 5 [PH] (ref 4.5–8)
POTASSIUM SERPL-SCNC: 4.7 MMOL/L (ref 3.4–5.1)
PROT SERPL-MCNC: 6.8 GM/DL (ref 6–8)
RBC #/AREA URNS HPF: (no result) RBC/HPF (ref 0–2)
SP GR UR: >= 1.03 (ref 1–1.03)
TESTOST SERPL-MCNC: 546 NG/DL (ref 113–882)
TRIGL SERPL-MCNC: 168 MG/DL (ref ?–150)
UROBILINOGEN UR STRIP-MCNC: 1 E.U./DL (ref 0–1)
WBC #/AREA URNS HPF: (no result) WBC/HPF (ref 0–5)

## 2023-09-05 RX ORDER — POTASSIUM CHLORIDE 750 MG/1
10 TABLET, EXTENDED RELEASE ORAL DAILY PRN
Qty: 30 TABLET | Refills: 11 | Status: SHIPPED | OUTPATIENT
Start: 2023-09-05

## 2023-09-05 RX ORDER — FUROSEMIDE 20 MG/1
20 TABLET ORAL DAILY PRN
Qty: 30 TABLET | Refills: 4 | Status: SHIPPED | OUTPATIENT
Start: 2023-09-05

## 2023-09-05 RX ORDER — NITROGLYCERIN 0.4 MG/1
0.4 TABLET SUBLINGUAL EVERY 5 MIN PRN
Qty: 25 TABLET | Refills: 3 | Status: SHIPPED | OUTPATIENT
Start: 2023-09-05

## 2024-01-11 ENCOUNTER — OFFICE VISIT (OUTPATIENT)
Dept: CARDIOLOGY CLINIC | Age: 64
End: 2024-01-11
Payer: MEDICAID

## 2024-01-11 VITALS
WEIGHT: 221 LBS | HEART RATE: 66 BPM | RESPIRATION RATE: 18 BRPM | BODY MASS INDEX: 32.73 KG/M2 | HEIGHT: 69 IN | DIASTOLIC BLOOD PRESSURE: 81 MMHG | SYSTOLIC BLOOD PRESSURE: 130 MMHG

## 2024-01-11 DIAGNOSIS — I10 HYPERTENSION, UNSPECIFIED TYPE: Primary | ICD-10-CM

## 2024-01-11 PROCEDURE — 99214 OFFICE O/P EST MOD 30 MIN: CPT | Performed by: INTERNAL MEDICINE

## 2024-01-11 PROCEDURE — 4004F PT TOBACCO SCREEN RCVD TLK: CPT | Performed by: INTERNAL MEDICINE

## 2024-01-11 PROCEDURE — 3079F DIAST BP 80-89 MM HG: CPT | Performed by: INTERNAL MEDICINE

## 2024-01-11 PROCEDURE — 3017F COLORECTAL CA SCREEN DOC REV: CPT | Performed by: INTERNAL MEDICINE

## 2024-01-11 PROCEDURE — G8484 FLU IMMUNIZE NO ADMIN: HCPCS | Performed by: INTERNAL MEDICINE

## 2024-01-11 PROCEDURE — 3075F SYST BP GE 130 - 139MM HG: CPT | Performed by: INTERNAL MEDICINE

## 2024-01-11 PROCEDURE — G8417 CALC BMI ABV UP PARAM F/U: HCPCS | Performed by: INTERNAL MEDICINE

## 2024-01-11 PROCEDURE — 93000 ELECTROCARDIOGRAM COMPLETE: CPT | Performed by: INTERNAL MEDICINE

## 2024-01-11 PROCEDURE — G8427 DOCREV CUR MEDS BY ELIG CLIN: HCPCS | Performed by: INTERNAL MEDICINE

## 2024-01-11 RX ORDER — ATORVASTATIN CALCIUM 40 MG/1
80 TABLET, FILM COATED ORAL NIGHTLY
Qty: 90 TABLET | Refills: 3 | Status: SHIPPED | OUTPATIENT
Start: 2024-01-11

## 2024-01-11 RX ORDER — TIRZEPATIDE 5 MG/.5ML
INJECTION, SOLUTION SUBCUTANEOUS
COMMUNITY
Start: 2023-10-31

## 2024-01-11 NOTE — PROGRESS NOTES
CHIEF COMPLAINT: CAD    HISTORY OF PRESENT ILLNESS: Patient is a 63 y.o. male seen at the request of Riri Bradford DO.      Patient presents in follow up. History of CAD.     No CP or SOB.    Past Medical History:   Diagnosis Date    CAD (coronary artery disease)     6 stents. Details unavailible    DM (diabetes mellitus) ([facility])     HTN (hypertension)     Hyperlipemia     MI (myocardial infarction) ([facility]) 2005/2010    stents 6       Patient Active Problem List   Diagnosis    HTN (hypertension)    Hyperlipemia    Type 2 diabetes mellitus, without long-term current use of insulin ([facility])    CAD (coronary artery disease)    Chest pain    Tobacco abuse    Noncompliance with medications       No Known Allergies    Current Outpatient Medications   Medication Sig Dispense Refill    MOUNJARO 5 MG/0.5ML SOPN SC injection       furosemide (LASIX) 20 MG tablet Take 1 tablet by mouth daily as needed (swelling) 30 tablet 4    nitroGLYCERIN (NITROSTAT) 0.4 MG SL tablet Place 1 tablet under the tongue every 5 minutes as needed for Chest pain 25 tablet 3    potassium chloride (KLOR-CON M) 10 MEQ extended release tablet Take 1 tablet by mouth daily as needed (swelling) 30 tablet 11    OZEMPIC, 1 MG/DOSE, 2 MG/1.5ML SOPN Inject 1 Syringe into the skin once a week      glimepiride (AMARYL) 2 MG tablet Take 1 tablet by mouth 2 times daily  1    VENTOLIN  (90 Base) MCG/ACT inhaler Inhale 1 puff into the lungs 4 times daily as needed for Wheezing 1 Inhaler 3    fluticasone (FLONASE) 50 MCG/ACT nasal spray 1 spray by Each Nostril route daily 1 Bottle 3    Coenzyme Q10 (CO Q-10) 100 MG CAPS Take 1 capsule by mouth daily  1    L-Methylfolate-B6-B12 (FOLTANX) 3-35-2 MG TABS Take 1 tablet by mouth daily  1    JARDIANCE 25 MG tablet Take 1 tablet by mouth daily  1    atorvastatin (LIPITOR) 80 MG tablet Take 1 tablet by mouth nightly      ezetimibe (ZETIA) 10 MG tablet Take 1 tablet by mouth daily      vitamin D (ERGOCALCIFEROL) 60188

## 2024-05-03 ENCOUNTER — HOSPITAL ENCOUNTER (EMERGENCY)
Dept: HOSPITAL 83 - ED | Age: 64
Discharge: HOME | End: 2024-05-03
Payer: COMMERCIAL

## 2024-05-03 VITALS — HEIGHT: 70 IN | WEIGHT: 210 LBS | BODY MASS INDEX: 30.06 KG/M2

## 2024-05-03 DIAGNOSIS — M25.512: Primary | ICD-10-CM

## 2024-05-03 DIAGNOSIS — E11.9: ICD-10-CM

## 2024-05-03 DIAGNOSIS — R07.81: ICD-10-CM
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
## 2024-05-03 DIAGNOSIS — I25.2: ICD-10-CM

## 2024-05-03 DIAGNOSIS — Z95.5: ICD-10-CM

## 2024-05-03 DIAGNOSIS — M25.561: ICD-10-CM

## 2024-05-03 DIAGNOSIS — Z98.890: ICD-10-CM
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
## 2024-05-03 DIAGNOSIS — F17.210: ICD-10-CM

## 2024-06-29 ENCOUNTER — HOSPITAL ENCOUNTER (OUTPATIENT)
Dept: HOSPITAL 83 - LAB | Age: 64
End: 2024-06-29
Attending: INTERNAL MEDICINE
Payer: COMMERCIAL

## 2024-06-29 DIAGNOSIS — E55.9: ICD-10-CM

## 2024-06-29 DIAGNOSIS — E11.65: Primary | ICD-10-CM

## 2024-06-29 DIAGNOSIS — E11.40: ICD-10-CM

## 2024-06-29 DIAGNOSIS — E78.5: ICD-10-CM

## 2024-06-29 LAB
25(OH)D3 SERPL-MCNC: 30.6 NG/ML (ref 30–100)
ALP SERPL-CCNC: 143 U/L (ref 46–116)
ALT SERPL W P-5'-P-CCNC: 24 U/L (ref 5–49)
BACTERIA #/AREA URNS HPF: (no result) /[HPF]
BUN SERPL-MCNC: 13 MG/DL (ref 9–23)
CHLORIDE SERPL-SCNC: 104 MMOL/L (ref 98–107)
CHOLEST SERPL-MCNC: 252 MG/DL (ref ?–200)
GLUCOSE UR QL: (no result)
LDLC SERPL DIRECT ASSAY-MCNC: 154 MG/DL (ref 9–159)
PH UR STRIP: 5 [PH] (ref 4.5–8)
POTASSIUM SERPL-SCNC: 4.4 MMOL/L (ref 3.4–5.1)
PROT SERPL-MCNC: 6.5 GM/DL (ref 6–8)
RBC #/AREA URNS HPF: (no result) RBC/HPF (ref 0–2)
SP GR UR: >= 1.03 (ref 1–1.03)
TRIGL SERPL-MCNC: 305 MG/DL (ref ?–150)
UROBILINOGEN UR STRIP-MCNC: 0.2 E.U./DL (ref 0–1)
WBC #/AREA URNS HPF: (no result) WBC/HPF (ref 0–5)

## 2024-10-03 ENCOUNTER — HOSPITAL ENCOUNTER (OUTPATIENT)
Dept: HOSPITAL 83 - LAB | Age: 64
Discharge: HOME | End: 2024-10-03
Attending: INTERNAL MEDICINE
Payer: COMMERCIAL

## 2024-10-03 DIAGNOSIS — E29.1: Primary | ICD-10-CM

## 2024-10-03 DIAGNOSIS — E11.40: ICD-10-CM

## 2024-10-03 DIAGNOSIS — E55.9: ICD-10-CM

## 2024-10-03 DIAGNOSIS — E11.65: ICD-10-CM

## 2024-10-03 DIAGNOSIS — N40.0: ICD-10-CM

## 2024-10-03 LAB
25(OH)D3 SERPL-MCNC: 37.6 NG/ML (ref 30–100)
ALP SERPL-CCNC: 115 U/L (ref 46–116)
ALT SERPL W P-5'-P-CCNC: 21 U/L (ref 5–49)
BUN SERPL-MCNC: 8 MG/DL (ref 9–23)
CASTS URNS QL MICRO: (no result)
CHLORIDE SERPL-SCNC: 100 MMOL/L (ref 98–107)
CHOLEST SERPL-MCNC: 254 MG/DL (ref ?–200)
EPI CELLS #/AREA URNS HPF: (no result) /[HPF]
GLUCOSE UR QL: (no result)
LDLC SERPL DIRECT ASSAY-MCNC: 177 MG/DL (ref 9–159)
MUCOUS THREADS URNS QL MICRO: (no result)
PH UR STRIP: 5 [PH] (ref 4.5–8)
POTASSIUM SERPL-SCNC: 4.2 MMOL/L (ref 3.4–5.1)
PROT SERPL-MCNC: 6.9 GM/DL (ref 6–8)
SP GR UR: >= 1.03 (ref 1–1.03)
TESTOST SERPL-MCNC: 404 NG/DL (ref 113–882)
TRIGL SERPL-MCNC: 165 MG/DL (ref ?–150)
UROBILINOGEN UR STRIP-MCNC: 0.2 E.U./DL (ref 0–1)
WBC #/AREA URNS HPF: (no result) WBC/HPF (ref 0–5)

## 2025-01-31 ENCOUNTER — TELEPHONE (OUTPATIENT)
Dept: CARDIOLOGY CLINIC | Age: 65
End: 2025-01-31

## 2025-01-31 RX ORDER — FUROSEMIDE 40 MG/1
40 TABLET ORAL DAILY
COMMUNITY
End: 2025-01-31 | Stop reason: SDUPTHER

## 2025-01-31 RX ORDER — POTASSIUM CHLORIDE 1500 MG/1
20 TABLET, EXTENDED RELEASE ORAL DAILY
COMMUNITY
End: 2025-01-31 | Stop reason: SDUPTHER

## 2025-01-31 RX ORDER — POTASSIUM CHLORIDE 1500 MG/1
20 TABLET, EXTENDED RELEASE ORAL DAILY
Qty: 30 TABLET | Refills: 5 | Status: SHIPPED | OUTPATIENT
Start: 2025-01-31

## 2025-01-31 RX ORDER — FUROSEMIDE 40 MG/1
40 TABLET ORAL DAILY
Qty: 30 TABLET | Refills: 5 | Status: SHIPPED | OUTPATIENT
Start: 2025-01-31

## 2025-01-31 NOTE — TELEPHONE ENCOUNTER
Take lasix 40 mg and kdur 20 meq twice a day for 2 days then reduce to lasix 40 mg daily and kdur 20 meq daily. Update us in 1 week.    Boston Flowers D.O.  Cardiologist  Cardiology, [facility]

## 2025-01-31 NOTE — TELEPHONE ENCOUNTER
Patient states that he has been having edema in his feet, he is taking lasix 20mg daily with potassium 10meq but he states it has not been helping, please advise

## 2025-03-01 ENCOUNTER — HOSPITAL ENCOUNTER (OUTPATIENT)
Dept: HOSPITAL 83 - LAB | Age: 65
Discharge: HOME | End: 2025-03-01
Attending: INTERNAL MEDICINE
Payer: COMMERCIAL

## 2025-03-01 DIAGNOSIS — N40.0: ICD-10-CM

## 2025-03-01 DIAGNOSIS — E11.65: Primary | ICD-10-CM

## 2025-03-01 DIAGNOSIS — E11.40: ICD-10-CM

## 2025-03-01 DIAGNOSIS — E78.5: ICD-10-CM

## 2025-03-01 DIAGNOSIS — E55.9: ICD-10-CM

## 2025-03-01 LAB
25(OH)D3 SERPL-MCNC: 41 NG/ML (ref 30–100)
ALP SERPL-CCNC: 96 U/L (ref 46–116)
ALT SERPL W P-5'-P-CCNC: 20 U/L (ref 5–49)
BACTERIA #/AREA URNS HPF: (no result) /[HPF]
BUN SERPL-MCNC: 9 MG/DL (ref 9–23)
CHLORIDE SERPL-SCNC: 101 MMOL/L (ref 98–107)
CHOLEST SERPL-MCNC: 278 MG/DL (ref ?–200)
GLUCOSE UR QL: (no result)
LDLC SERPL DIRECT ASSAY-MCNC: 197 MG/DL (ref 9–159)
MUCOUS THREADS URNS QL MICRO: (no result)
PH UR STRIP: 5 [PH] (ref 4.5–8)
POTASSIUM SERPL-SCNC: 4.3 MMOL/L (ref 3.4–5.1)
PROT SERPL-MCNC: 6.7 GM/DL (ref 6–8)
RBC #/AREA URNS HPF: (no result) RBC/HPF (ref 0–2)
SP GR UR: >= 1.03 (ref 1–1.03)
TRIGL SERPL-MCNC: 193 MG/DL (ref ?–150)
UROBILINOGEN UR STRIP-MCNC: 1 E.U./DL (ref 0–1)
WBC #/AREA URNS HPF: (no result) WBC/HPF (ref 0–5)

## 2025-03-26 ENCOUNTER — OFFICE VISIT (OUTPATIENT)
Dept: CARDIOLOGY CLINIC | Age: 65
End: 2025-03-26
Payer: MEDICAID

## 2025-03-26 VITALS
TEMPERATURE: 96.9 F | RESPIRATION RATE: 18 BRPM | HEIGHT: 70 IN | HEART RATE: 81 BPM | SYSTOLIC BLOOD PRESSURE: 130 MMHG | DIASTOLIC BLOOD PRESSURE: 82 MMHG | WEIGHT: 213.7 LBS | BODY MASS INDEX: 30.59 KG/M2 | OXYGEN SATURATION: 96 %

## 2025-03-26 DIAGNOSIS — I10 HYPERTENSION, UNSPECIFIED TYPE: Primary | ICD-10-CM

## 2025-03-26 DIAGNOSIS — R06.02 SHORTNESS OF BREATH: ICD-10-CM

## 2025-03-26 DIAGNOSIS — R07.9 CHEST PAIN, UNSPECIFIED TYPE: ICD-10-CM

## 2025-03-26 DIAGNOSIS — R07.2 PRECORDIAL PAIN: ICD-10-CM

## 2025-03-26 DIAGNOSIS — I25.10 CORONARY ARTERY DISEASE INVOLVING NATIVE CORONARY ARTERY OF NATIVE HEART WITHOUT ANGINA PECTORIS: ICD-10-CM

## 2025-03-26 DIAGNOSIS — E78.01 FAMILIAL HYPERCHOLESTEROLEMIA: ICD-10-CM

## 2025-03-26 PROCEDURE — 3079F DIAST BP 80-89 MM HG: CPT | Performed by: INTERNAL MEDICINE

## 2025-03-26 PROCEDURE — G2211 COMPLEX E/M VISIT ADD ON: HCPCS | Performed by: INTERNAL MEDICINE

## 2025-03-26 PROCEDURE — G8417 CALC BMI ABV UP PARAM F/U: HCPCS | Performed by: INTERNAL MEDICINE

## 2025-03-26 PROCEDURE — 4004F PT TOBACCO SCREEN RCVD TLK: CPT | Performed by: INTERNAL MEDICINE

## 2025-03-26 PROCEDURE — 93000 ELECTROCARDIOGRAM COMPLETE: CPT | Performed by: INTERNAL MEDICINE

## 2025-03-26 PROCEDURE — G8427 DOCREV CUR MEDS BY ELIG CLIN: HCPCS | Performed by: INTERNAL MEDICINE

## 2025-03-26 PROCEDURE — 3075F SYST BP GE 130 - 139MM HG: CPT | Performed by: INTERNAL MEDICINE

## 2025-03-26 PROCEDURE — 99214 OFFICE O/P EST MOD 30 MIN: CPT | Performed by: INTERNAL MEDICINE

## 2025-03-26 PROCEDURE — 3017F COLORECTAL CA SCREEN DOC REV: CPT | Performed by: INTERNAL MEDICINE

## 2025-03-26 RX ORDER — INSULIN GLARGINE 100 [IU]/ML
INJECTION, SOLUTION SUBCUTANEOUS
COMMUNITY
Start: 2025-03-03

## 2025-03-26 RX ORDER — REGADENOSON 0.08 MG/ML
0.4 INJECTION, SOLUTION INTRAVENOUS
OUTPATIENT
Start: 2025-03-26

## 2025-03-26 RX ORDER — EVOLOCUMAB 140 MG/ML
INJECTION, SOLUTION SUBCUTANEOUS
COMMUNITY
Start: 2025-03-24

## 2025-03-26 RX ORDER — HYDROCHLOROTHIAZIDE 12.5 MG/1
CAPSULE ORAL
COMMUNITY
Start: 2025-03-17

## 2025-03-26 RX ORDER — GLIPIZIDE 5 MG/1
TABLET ORAL
COMMUNITY
Start: 2025-03-03

## 2025-03-26 NOTE — PROGRESS NOTES
CHIEF COMPLAINT: CAD    HISTORY OF PRESENT ILLNESS: Patient is a 64 y.o. male seen at the request of Riri Bradford DO.      Patient presents in follow up. History of CAD.     Some  CP and MARTINO. Significant fatigue.    Past Medical History:   Diagnosis Date    CAD (coronary artery disease)     6 stents. Details unavailible    DM [facility]diabetes mellitus) [facility]     HTN (hypertension)     Hyperlipemia     MI (myocardial infarction) ([facility]) 2005/2010    stents 6       Patient Active Problem List   Diagnosis    HTN (hypertension)    Hyperlipemia    Type 2 diabetes mellitus, without long-term current use of insulin ([facility])    CAD (coronary artery disease)    Chest pain    Tobacco abuse    Noncompliance with medications       No Known Allergies    Current Outpatient Medications   Medication Sig Dispense Refill    glipiZIDE (GLUCOTROL) 5 MG tablet TAKE 1 TABLET BY MOUTH TWICE DAILY (BEFORE BREAKFAST AND BEFORE DINNER)      LANTUS SOLOSTAR 100 UNIT/ML injection pen INJECT 20 UNITS SUBCUTANEOUSLY ONCE DAILY IN THE MORNING      Continuous Glucose Sensor (FREESTYLE YURIDIA 3 PLUS SENSOR) MISC CHANGE SENSOR EVER 15 DAYS      REPATHA SURECLICK 140 MG/ML SOAJ INJECT 1 SYRINGE SUB-Q EVERY 2 WEEKS      potassium chloride (KLOR-CON M) 20 MEQ extended release tablet Take 1 tablet by mouth daily 30 tablet 5    furosemide (LASIX) 40 MG tablet Take 1 tablet by mouth daily 30 tablet 5    MOUNJARO 5 MG/0.5ML SOPN SC injection       nitroGLYCERIN (NITROSTAT) 0.4 MG SL tablet Place 1 tablet under the tongue every 5 minutes as needed for Chest pain 25 tablet 3    VENTOLIN  (90 Base) MCG/ACT inhaler Inhale 1 puff into the lungs 4 times daily as needed for Wheezing 1 Inhaler 3    vitamin D (ERGOCALCIFEROL) 02205 UNITS CAPS capsule Take 5,000 Units by mouth daily       metFORMIN (GLUCOPHAGE) 1000 MG tablet Take 1 tablet by mouth 2 times daily (with meals)      aspirin EC 81 MG EC tablet Take 1 tablet by mouth daily      atorvastatin (LIPITOR) 40

## 2025-04-09 ENCOUNTER — TELEPHONE (OUTPATIENT)
Dept: CARDIOLOGY | Age: 65
End: 2025-04-09

## 2025-04-09 NOTE — TELEPHONE ENCOUNTER
Spoke with patient and confirmed both echo and pharmacological stress test appointments on April 11, 2025 starting at 0900. Instructions for test,including holding all diabetic medications morning of test and bringing albuterol inhaler to appointment, and COVID-19 preprocedure information reviewed with patient, questions answered. Patient verbalized understanding. Also instructed to call office if unable to keep appointment.

## 2025-04-11 ENCOUNTER — HOSPITAL ENCOUNTER (OUTPATIENT)
Dept: CARDIOLOGY | Age: 65
Discharge: HOME | End: 2025-04-13
Attending: INTERNAL MEDICINE
Payer: MEDICAID

## 2025-04-11 ENCOUNTER — HOSPITAL ENCOUNTER (OUTPATIENT)
Dept: CARDIOLOGY | Age: 65
Discharge: HOME | End: 2025-04-13
Payer: MEDICAID

## 2025-04-11 VITALS
WEIGHT: 213 LBS | BODY MASS INDEX: 30.49 KG/M2 | HEIGHT: 70 IN | SYSTOLIC BLOOD PRESSURE: 130 MMHG | DIASTOLIC BLOOD PRESSURE: 82 MMHG

## 2025-04-11 VITALS
DIASTOLIC BLOOD PRESSURE: 72 MMHG | RESPIRATION RATE: 18 BRPM | WEIGHT: 213 LBS | HEIGHT: 70 IN | SYSTOLIC BLOOD PRESSURE: 116 MMHG | HEART RATE: 66 BPM | BODY MASS INDEX: 30.49 KG/M2

## 2025-04-11 DIAGNOSIS — I25.10 CORONARY ARTERY DISEASE INVOLVING NATIVE CORONARY ARTERY OF NATIVE HEART WITHOUT ANGINA PECTORIS: ICD-10-CM

## 2025-04-11 DIAGNOSIS — R07.9 CHEST PAIN, UNSPECIFIED TYPE: ICD-10-CM

## 2025-04-11 DIAGNOSIS — R06.02 SHORTNESS OF BREATH: ICD-10-CM

## 2025-04-11 LAB
ECHO AO ASC DIAM: 3.1 CM
ECHO AO ASCENDING AORTA INDEX: 1.45 CM/M2
ECHO AV AREA PEAK VELOCITY: 1.8 CM2
ECHO AV AREA VTI: 2 CM2
ECHO AV AREA/BSA PEAK VELOCITY: 0.8 CM2/M2
ECHO AV AREA/BSA VTI: 0.9 CM2/M2
ECHO AV MEAN GRADIENT: 3 MMHG
ECHO AV MEAN VELOCITY: 0.8 M/S
ECHO AV PEAK GRADIENT: 7 MMHG
ECHO AV PEAK VELOCITY: 1.3 M/S
ECHO AV VELOCITY RATIO: 0.62
ECHO AV VTI: 24.1 CM
ECHO BSA: 2.18 M2
ECHO BSA: 2.18 M2
ECHO EST RA PRESSURE: 3 MMHG
ECHO LA DIAMETER INDEX: 1.68 CM/M2
ECHO LA DIAMETER: 3.6 CM
ECHO LA VOL A-L A2C: 29 ML (ref 18–58)
ECHO LA VOL A-L A4C: 32 ML (ref 18–58)
ECHO LA VOL MOD A2C: 27 ML (ref 18–58)
ECHO LA VOL MOD A4C: 29 ML (ref 18–58)
ECHO LA VOLUME AREA LENGTH: 32 ML
ECHO LA VOLUME INDEX A-L A2C: 14 ML/M2 (ref 16–34)
ECHO LA VOLUME INDEX A-L A4C: 15 ML/M2 (ref 16–34)
ECHO LA VOLUME INDEX AREA LENGTH: 15 ML/M2 (ref 16–34)
ECHO LA VOLUME INDEX MOD A2C: 13 ML/M2 (ref 16–34)
ECHO LA VOLUME INDEX MOD A4C: 14 ML/M2 (ref 16–34)
ECHO LV EDV A2C: 149 ML
ECHO LV EDV A4C: 149 ML
ECHO LV EDV BP: 151 ML (ref 67–155)
ECHO LV EDV INDEX A4C: 70 ML/M2
ECHO LV EDV INDEX BP: 71 ML/M2
ECHO LV EDV NDEX A2C: 70 ML/M2
ECHO LV EF PHYSICIAN: 40 %
ECHO LV EJECTION FRACTION A2C: 51 %
ECHO LV EJECTION FRACTION A4C: 35 %
ECHO LV EJECTION FRACTION BIPLANE: 43 % (ref 55–100)
ECHO LV ESV A2C: 72 ML
ECHO LV ESV A4C: 97 ML
ECHO LV ESV BP: 86 ML (ref 22–58)
ECHO LV ESV INDEX A2C: 34 ML/M2
ECHO LV ESV INDEX A4C: 45 ML/M2
ECHO LV ESV INDEX BP: 40 ML/M2
ECHO LV FRACTIONAL SHORTENING: 25 % (ref 28–44)
ECHO LV INTERNAL DIMENSION DIASTOLE INDEX: 2.43 CM/M2
ECHO LV INTERNAL DIMENSION DIASTOLIC: 5.2 CM (ref 4.2–5.9)
ECHO LV INTERNAL DIMENSION SYSTOLIC INDEX: 1.82 CM/M2
ECHO LV INTERNAL DIMENSION SYSTOLIC: 3.9 CM
ECHO LV ISOVOLUMETRIC RELAXATION TIME (IVRT): 103.8 MS
ECHO LV IVSD: 1.1 CM (ref 0.6–1)
ECHO LV IVSS: 1.4 CM
ECHO LV MASS 2D: 220.8 G (ref 88–224)
ECHO LV MASS INDEX 2D: 103.2 G/M2 (ref 49–115)
ECHO LV POSTERIOR WALL DIASTOLIC: 1.1 CM (ref 0.6–1)
ECHO LV POSTERIOR WALL SYSTOLIC: 1.3 CM
ECHO LV RELATIVE WALL THICKNESS RATIO: 0.42
ECHO LVOT AREA: 3.1 CM2
ECHO LVOT AV VTI INDEX: 0.66
ECHO LVOT DIAM: 2 CM
ECHO LVOT MEAN GRADIENT: 1 MMHG
ECHO LVOT PEAK GRADIENT: 3 MMHG
ECHO LVOT PEAK VELOCITY: 0.8 M/S
ECHO LVOT STROKE VOLUME INDEX: 23.3 ML/M2
ECHO LVOT SV: 49.9 ML
ECHO LVOT VTI: 15.9 CM
ECHO MV "A" WAVE DURATION: 128 MSEC
ECHO MV A VELOCITY: 0.7 M/S
ECHO MV AREA PHT: 1.9 CM2
ECHO MV AREA VTI: 2 CM2
ECHO MV E DECELERATION TIME (DT): 244.3 MS
ECHO MV E VELOCITY: 0.58 M/S
ECHO MV E/A RATIO: 0.83
ECHO MV LVOT VTI INDEX: 1.6
ECHO MV MAX VELOCITY: 0.8 M/S
ECHO MV MEAN GRADIENT: 1 MMHG
ECHO MV MEAN VELOCITY: 0.5 M/S
ECHO MV PEAK GRADIENT: 2 MMHG
ECHO MV PRESSURE HALF TIME (PHT): 114.7 MS
ECHO MV VTI: 25.4 CM
ECHO PV MAX VELOCITY: 1.2 M/S
ECHO PV MEAN GRADIENT: 3 MMHG
ECHO PV MEAN VELOCITY: 0.8 M/S
ECHO PV PEAK GRADIENT: 5 MMHG
ECHO PV VTI: 23.8 CM
ECHO RV INTERNAL DIMENSION: 3 CM
ECHO RV TAPSE: 2.6 CM (ref 1.7–?)
NUC STRESS EJECTION FRACTION: 52 %
STRESS BASELINE DIAS BP: 72 MMHG
STRESS BASELINE HR: 71 BPM
STRESS BASELINE SYS BP: 116 MMHG
STRESS ESTIMATED WORKLOAD: 1.1 METS
STRESS PEAK DIAS BP: 72 MMHG
STRESS PEAK SYS BP: 116 MMHG
STRESS PERCENT HR ACHIEVED: 63 %
STRESS POST PEAK HR: 99 BPM
STRESS RATE PRESSURE PRODUCT: NORMAL BPM*MMHG
STRESS TARGET HR: 158 BPM
TID: 1.07

## 2025-04-11 PROCEDURE — 93017 CV STRESS TEST TRACING ONLY: CPT

## 2025-04-11 PROCEDURE — 93306 TTE W/DOPPLER COMPLETE: CPT | Performed by: INTERNAL MEDICINE

## 2025-04-11 PROCEDURE — 3430000000 HC RX DIAGNOSTIC RADIOPHARMACEUTICAL: Performed by: INTERNAL MEDICINE

## 2025-04-11 PROCEDURE — 93016 CV STRESS TEST SUPVJ ONLY: CPT | Performed by: INTERNAL MEDICINE

## 2025-04-11 PROCEDURE — A9500 TC99M SESTAMIBI: HCPCS | Performed by: INTERNAL MEDICINE

## 2025-04-11 PROCEDURE — 2500000003 HC RX 250 WO HCPCS: Performed by: INTERNAL MEDICINE

## 2025-04-11 PROCEDURE — 6360000002 HC RX W HCPCS: Performed by: INTERNAL MEDICINE

## 2025-04-11 PROCEDURE — 78452 HT MUSCLE IMAGE SPECT MULT: CPT | Performed by: INTERNAL MEDICINE

## 2025-04-11 PROCEDURE — 6360000004 HC RX CONTRAST MEDICATION: Performed by: INTERNAL MEDICINE

## 2025-04-11 PROCEDURE — 93306 TTE W/DOPPLER COMPLETE: CPT

## 2025-04-11 PROCEDURE — 93018 CV STRESS TEST I&R ONLY: CPT | Performed by: INTERNAL MEDICINE

## 2025-04-11 RX ORDER — TETRAKIS(2-METHOXYISOBUTYLISOCYANIDE)COPPER(I) TETRAFLUOROBORATE 1 MG/ML
33 INJECTION, POWDER, LYOPHILIZED, FOR SOLUTION INTRAVENOUS
Status: COMPLETED | OUTPATIENT
Start: 2025-04-11 | End: 2025-04-11

## 2025-04-11 RX ORDER — SODIUM CHLORIDE 0.9 % (FLUSH) 0.9 %
10 SYRINGE (ML) INJECTION PRN
Status: DISCONTINUED | OUTPATIENT
Start: 2025-04-11 | End: 2025-04-14 | Stop reason: HOSPADM

## 2025-04-11 RX ORDER — GLIMEPIRIDE 4 MG/1
4 TABLET ORAL 2 TIMES DAILY
COMMUNITY
Start: 2025-01-04

## 2025-04-11 RX ORDER — TETRAKIS(2-METHOXYISOBUTYLISOCYANIDE)COPPER(I) TETRAFLUOROBORATE 1 MG/ML
10.7 INJECTION, POWDER, LYOPHILIZED, FOR SOLUTION INTRAVENOUS
Status: COMPLETED | OUTPATIENT
Start: 2025-04-11 | End: 2025-04-11

## 2025-04-11 RX ORDER — REGADENOSON 0.08 MG/ML
0.4 INJECTION, SOLUTION INTRAVENOUS
Status: COMPLETED | OUTPATIENT
Start: 2025-04-11 | End: 2025-04-11

## 2025-04-11 RX ADMIN — SODIUM CHLORIDE, PRESERVATIVE FREE 10 ML: 5 INJECTION INTRAVENOUS at 10:56

## 2025-04-11 RX ADMIN — SODIUM CHLORIDE, PRESERVATIVE FREE 10 ML: 5 INJECTION INTRAVENOUS at 08:56

## 2025-04-11 RX ADMIN — SULFUR HEXAFLUORIDE 2 ML: KIT at 09:31

## 2025-04-11 RX ADMIN — Medication 33 MILLICURIE: at 10:55

## 2025-04-11 RX ADMIN — REGADENOSON 0.4 MG: 0.08 INJECTION, SOLUTION INTRAVENOUS at 10:55

## 2025-04-11 RX ADMIN — SODIUM CHLORIDE, PRESERVATIVE FREE 10 ML: 5 INJECTION INTRAVENOUS at 10:55

## 2025-04-11 RX ADMIN — Medication 10.7 MILLICURIE: at 08:56

## 2025-04-11 RX ADMIN — SODIUM CHLORIDE, PRESERVATIVE FREE 10 ML: 5 INJECTION INTRAVENOUS at 09:34

## 2025-04-22 ENCOUNTER — TELEPHONE (OUTPATIENT)
Dept: CARDIOLOGY CLINIC | Age: 65
End: 2025-04-22

## 2025-04-28 ENCOUNTER — RESULTS FOLLOW-UP (OUTPATIENT)
Dept: CARDIOLOGY | Age: 65
End: 2025-04-28

## 2025-04-28 RX ORDER — METOPROLOL SUCCINATE 25 MG/1
25 TABLET, EXTENDED RELEASE ORAL DAILY
Qty: 30 TABLET | Refills: 5 | Status: SHIPPED | OUTPATIENT
Start: 2025-04-28

## 2025-04-28 RX ORDER — METOPROLOL SUCCINATE 25 MG/1
25 TABLET, EXTENDED RELEASE ORAL DAILY
COMMUNITY
End: 2025-04-28 | Stop reason: SDUPTHER

## 2025-04-28 RX ORDER — ISOSORBIDE MONONITRATE 30 MG/1
30 TABLET, EXTENDED RELEASE ORAL DAILY
COMMUNITY
End: 2025-04-28 | Stop reason: SDUPTHER

## 2025-04-28 RX ORDER — ISOSORBIDE MONONITRATE 30 MG/1
30 TABLET, EXTENDED RELEASE ORAL DAILY
Qty: 30 TABLET | Refills: 5 | Status: SHIPPED | OUTPATIENT
Start: 2025-04-28

## 2025-04-28 NOTE — TELEPHONE ENCOUNTER
----- Message from Dr. Boston Flowers DO sent at 4/28/2025  8:49 AM EDT -----  Please notify patient that their echo showed a moderately weakened heart muscles, mildly worse than prior.     The stress showed a large-sided abnormal area on front of heart.     Can we arrange cath AUC 7/54.    Start toprol 25 mg daily and imdur 30 mg daily.     Boston Flowers D.O.  Cardiologist  Cardiology, [facility]

## 2025-04-29 DIAGNOSIS — R94.39 ABNORMAL STRESS TEST: ICD-10-CM

## 2025-04-29 DIAGNOSIS — I25.10 CORONARY ARTERY DISEASE INVOLVING NATIVE CORONARY ARTERY OF NATIVE HEART WITHOUT ANGINA PECTORIS: Primary | ICD-10-CM

## 2025-04-29 DIAGNOSIS — Z01.818 PREOPERATIVE TESTING: ICD-10-CM

## 2025-05-01 ENCOUNTER — TELEPHONE (OUTPATIENT)
Dept: CARDIOLOGY CLINIC | Age: 65
End: 2025-05-01

## 2025-05-02 ENCOUNTER — TELEPHONE (OUTPATIENT)
Dept: CARDIOLOGY CLINIC | Age: 65
End: 2025-05-02

## 2025-05-02 NOTE — TELEPHONE ENCOUNTER
Prior auth approved Ref# [id number] valid 5/2/2025 - 7/31/2025  Labs ordered  Instructions given regarding metformin,jardiance and Mounjaro  Patient understood instructions.

## 2025-05-02 NOTE — TELEPHONE ENCOUNTER
PRIOR AUTH FOR [id number] ALLI'D FOR 5/6/25 HAS BEEN APPROVED THROUGH [employer]  REF # [id number]  REF#[id number]  AUTH# [id number]  VALID DATES 5/2/25-7/31/25      YEMI PACHECO

## 2025-05-05 ENCOUNTER — HOSPITAL ENCOUNTER (OUTPATIENT)
Dept: HOSPITAL 83 - LAB | Age: 65
Discharge: HOME | End: 2025-05-05
Attending: INTERNAL MEDICINE
Payer: COMMERCIAL

## 2025-05-05 DIAGNOSIS — R94.39: ICD-10-CM

## 2025-05-05 DIAGNOSIS — Z01.818: Primary | ICD-10-CM

## 2025-05-05 DIAGNOSIS — I25.10: ICD-10-CM

## 2025-05-05 LAB
25(OH)D3 SERPL-MCNC: 66.4 NG/ML (ref 30–100)
ALP SERPL-CCNC: 90 U/L (ref 46–116)
ALT SERPL W P-5'-P-CCNC: 20 U/L (ref 5–49)
APTT PPP: 27.7 SECONDS (ref 20–32.1)
APTT: 27.7 SECONDS (ref 20–32.1)
BASOPHILS # BLD AUTO: 0.1 10*3/UL (ref 0–0.1)
BASOPHILS ABSOLUTE: 0.1 10*3/UL (ref 0–0.1)
BASOPHILS NFR BLD AUTO: 0.7 % (ref 0–1)
BASOPHILS RELATIVE PERCENT: 0.7 % (ref 0–1)
BUN BLDV-MCNC: 14 MG/DL (ref 9–23)
BUN SERPL-MCNC: 14 MG/DL (ref 9–23)
CALCIUM SERPL-MCNC: 9.7 MD/DL (ref 8.7–10.4)
CHLORIDE BLD-SCNC: 101 MMOL/L (ref 98–107)
CHLORIDE SERPL-SCNC: 101 MMOL/L (ref 98–107)
CHOLEST SERPL-MCNC: 241 MG/DL (ref ?–200)
CO2: 32 MMOL/L (ref 20–31)
CREAT SERPL-MCNC: 0.94 MG/DL (ref 0.7–1.3)
EOSINOPHIL # BLD AUTO: 0.1 10*3/UL (ref 0–0.4)
EOSINOPHIL # BLD AUTO: 1.6 % (ref 1–4)
EOSINOPHILS ABSOLUTE: 0.1 10*3/UL (ref 0–0.4)
EOSINOPHILS RELATIVE PERCENT: 1.6 % (ref 1–4)
GFR AFRICAN AMERICAN: > 60 ML/MIN
GFR, ESTIMATED: > 60 ML/MIN
GLUCOSE: 161 MG/DL (ref 65–99)
HCT VFR BLD AUTO: 51.8 % (ref 42–52)
HCT VFR BLD CALC: 51.8 % (ref 42–52)
HEMOGLOBIN: 16.3 G/DL (ref 14–18)
IMMATURE GRANULOCYTES #: 0 10*3/UL (ref 0–0.1)
IMMATURE GRANULOCYTES %: 0.6 % (ref 0–1)
INR BLD: 0.9 (ref 2–3.5)
LDLC SERPL DIRECT ASSAY-MCNC: 159 MG/DL (ref 9–159)
LYMPHOCYTES ABSOLUTE: 1.6 10*3/UL (ref 1.3–4.4)
LYMPHOCYTES RELATIVE PERCENT: 23.6 % (ref 27–41)
MCH RBC QN AUTO: 28.4 PG (ref 27–31)
MCH RBC QN AUTO: 28.4 PG (ref 27–31)
MCHC RBC AUTO-ENTMCNC: 31.5 G/DL (ref 33–37)
MCHC RBC AUTO-ENTMCNC: 31.5 G/DL (ref 33–37)
MCV RBC AUTO: 90.2 FL (ref 80–94)
MCV RBC AUTO: 90.2 FL (ref 80–94)
MONOCYTES # BLD AUTO: 0.8 10*3/UL (ref 0.1–1)
MONOCYTES NFR BLD MANUAL: 10.9 % (ref 3–9)
MONOCYTES RELATIVE PERCENT: 0.8 10*3/UL (ref 0.1–1)
MONOCYTES RELATIVE PERCENT: 10.9 % (ref 3–9)
MUCOUS THREADS URNS QL MICRO: (no result)
NEUT #: 4.4 10*3/UL (ref 2.3–7.9)
NEUTROPHILS ABSOLUTE: 4.4 10*3/UL (ref 2.3–7.9)
NEUTROPHILS NFR BLD AUTO: 62.6 % (ref 47–73)
NEUTROPHILS RELATIVE PERCENT: 62.6 % (ref 47–73)
NUCLEATED RED BLOOD CELLS: 0 % (ref 0–0)
PDW BLD-RTO: 13 % (ref 0–14.5)
PH UR STRIP: 5.5 [PH] (ref 4.5–8)
PLATELET # BLD AUTO: 263 10*3/UL (ref 130–400)
PLATELET # BLD: 263 10*3/UL (ref 130–400)
PMV BLD AUTO: 10.3 FL (ref 9.6–12.3)
PMV BLD AUTO: 10.3 FL (ref 9.6–12.3)
POTASSIUM SERPL-SCNC: 4.1 MMOL/L (ref 3.4–5.1)
POTASSIUM SERPL-SCNC: 4.1 MMOL/L (ref 3.4–5.1)
PROT SERPL-MCNC: 7.5 GM/DL (ref 6–8)
PROTHROMBIN TIME: 9.9 SECONDS (ref 8.9–12.2)
RBC # BLD AUTO: 5.74 10*6/UL (ref 4.5–5.9)
RBC # BLD: 5.74 10*6/UL (ref 4.5–5.9)
SODIUM BLD-SCNC: 139 MMOL/L (ref 136–145)
TRIGL SERPL-MCNC: 197 MG/DL (ref ?–150)
WBC # BLD: 7 10*3/UL (ref 4.8–10.8)

## 2025-05-06 ENCOUNTER — HOSPITAL ENCOUNTER (OUTPATIENT)
Age: 65
Setting detail: OUTPATIENT SURGERY
Discharge: HOME | End: 2025-05-06
Payer: MEDICAID

## 2025-05-06 VITALS
RESPIRATION RATE: 18 BRPM | DIASTOLIC BLOOD PRESSURE: 73 MMHG | WEIGHT: 212.96 LBS | SYSTOLIC BLOOD PRESSURE: 117 MMHG | OXYGEN SATURATION: 94 % | BODY MASS INDEX: 30.56 KG/M2 | TEMPERATURE: 97.9 F | HEART RATE: 62 BPM

## 2025-05-06 DIAGNOSIS — R94.39 ABNORMAL STRESS TEST: ICD-10-CM

## 2025-05-06 LAB
ABO + RH BLD: NORMAL
ARM BAND NUMBER: NORMAL
BLOOD BANK SAMPLE EXPIRATION: NORMAL
BLOOD GROUP ANTIBODIES SERPL: NEGATIVE

## 2025-05-06 PROCEDURE — C1894 INTRO/SHEATH, NON-LASER: HCPCS | Performed by: INTERNAL MEDICINE

## 2025-05-06 PROCEDURE — 93458 L HRT ARTERY/VENTRICLE ANGIO: CPT | Performed by: INTERNAL MEDICINE

## 2025-05-06 PROCEDURE — 86850 RBC ANTIBODY SCREEN: CPT

## 2025-05-06 PROCEDURE — 2709999900 HC NON-CHARGEABLE SUPPLY: Performed by: INTERNAL MEDICINE

## 2025-05-06 PROCEDURE — 86900 BLOOD TYPING SEROLOGIC ABO: CPT

## 2025-05-06 PROCEDURE — C1769 GUIDE WIRE: HCPCS | Performed by: INTERNAL MEDICINE

## 2025-05-06 PROCEDURE — 7100000010 HC PHASE II RECOVERY - FIRST 15 MIN: Performed by: INTERNAL MEDICINE

## 2025-05-06 PROCEDURE — 6360000002 HC RX W HCPCS: Performed by: INTERNAL MEDICINE

## 2025-05-06 PROCEDURE — 6360000004 HC RX CONTRAST MEDICATION: Performed by: INTERNAL MEDICINE

## 2025-05-06 PROCEDURE — 7100000011 HC PHASE II RECOVERY - ADDTL 15 MIN: Performed by: INTERNAL MEDICINE

## 2025-05-06 PROCEDURE — 86901 BLOOD TYPING SEROLOGIC RH(D): CPT

## 2025-05-06 RX ORDER — FENTANYL CITRATE 50 UG/ML
INJECTION, SOLUTION INTRAMUSCULAR; INTRAVENOUS PRN
Status: DISCONTINUED | OUTPATIENT
Start: 2025-05-06 | End: 2025-05-06 | Stop reason: HOSPADM

## 2025-05-06 RX ORDER — SODIUM CHLORIDE 0.9 % (FLUSH) 0.9 %
5-40 SYRINGE (ML) INJECTION PRN
OUTPATIENT
Start: 2025-05-06

## 2025-05-06 RX ORDER — SODIUM CHLORIDE 9 MG/ML
INJECTION, SOLUTION INTRAVENOUS CONTINUOUS
Status: DISCONTINUED | OUTPATIENT
Start: 2025-05-06 | End: 2025-05-06 | Stop reason: HOSPADM

## 2025-05-06 RX ORDER — ACETAMINOPHEN 325 MG/1
650 TABLET ORAL EVERY 4 HOURS PRN
OUTPATIENT
Start: 2025-05-06

## 2025-05-06 RX ORDER — SODIUM CHLORIDE 9 MG/ML
INJECTION, SOLUTION INTRAVENOUS PRN
OUTPATIENT
Start: 2025-05-06

## 2025-05-06 RX ORDER — MIDAZOLAM HYDROCHLORIDE 1 MG/ML
INJECTION, SOLUTION INTRAMUSCULAR; INTRAVENOUS PRN
Status: DISCONTINUED | OUTPATIENT
Start: 2025-05-06 | End: 2025-05-06 | Stop reason: HOSPADM

## 2025-05-06 RX ORDER — IOPAMIDOL 755 MG/ML
INJECTION, SOLUTION INTRAVASCULAR PRN
Status: DISCONTINUED | OUTPATIENT
Start: 2025-05-06 | End: 2025-05-06 | Stop reason: HOSPADM

## 2025-05-06 RX ORDER — HEPARIN SODIUM 10000 [USP'U]/ML
INJECTION, SOLUTION INTRAVENOUS; SUBCUTANEOUS PRN
Status: DISCONTINUED | OUTPATIENT
Start: 2025-05-06 | End: 2025-05-06 | Stop reason: HOSPADM

## 2025-05-06 RX ORDER — SODIUM CHLORIDE 0.9 % (FLUSH) 0.9 %
5-40 SYRINGE (ML) INJECTION EVERY 12 HOURS SCHEDULED
OUTPATIENT
Start: 2025-05-06

## 2025-05-06 RX ORDER — NITROGLYCERIN 20 MG/100ML
INJECTION INTRAVENOUS PRN
Status: DISCONTINUED | OUTPATIENT
Start: 2025-05-06 | End: 2025-05-06 | Stop reason: HOSPADM

## 2025-05-06 NOTE — PROGRESS NOTES
Chart reviewed.     Patient with     Patient Active Problem List   Diagnosis    HTN (hypertension)    Hyperlipemia    Type 2 diabetes mellitus, without long-term current use of insulin (HCC)    CAD (coronary artery disease)    Chest pain    Tobacco abuse    Noncompliance with medications    Abnormal stress test        CAD  HTN  HLD  Prior MI 2005, 2010 with 6 stents      Further testing and recommendations to follow.   My attending, Dr. Westbrook, will see in office next week for formal consult.    Future Appointments   Date Time Provider Department Center   5/13/2025  9:00 AM Ernesto Westbrook MD CARDIO SURG [facility]         Buffy Gonzalez, APRN - CNP

## 2025-05-06 NOTE — H&P
Reason for left heart catheterization: Abnormal nuclear stress test.    Referring physician: Dr. Flowers    History of present illness: 64-year-old obese chronic smoker male was referred for left heart catheterization due to abnormal nuclear stress test.  Patient has been complaining of fatigue over the past 3 months.  He has not been active.  He denies chest discomfort or dyspnea at his level of activity.  He denies palpitations or syncope.  He feels occasional lightheadedness and admits to get lower extremity edema.    Past medical history:   CAD (coronary artery disease)       6 stents. Details unavailible    DM (diabetes mellitus) ([facility]      HTN (hypertension)      Hyperlipemia      MI (myocardial infarction) ([facility]) 2005/2010     stents 6       No known drug allergies    Medications   glipiZIDE (GLUCOTROL) 5 MG tablet TAKE 1 TABLET BY MOUTH TWICE DAILY (BEFORE BREAKFAST AND BEFORE DINNER)        LANTUS SOLOSTAR 100 UNIT/ML injection pen INJECT 20 UNITS SUBCUTANEOUSLY ONCE DAILY IN THE MORNING        Continuous Glucose Sensor (FREESTYLE YURIDIA 3 PLUS SENSOR) MISC CHANGE SENSOR EVER 15 DAYS        REPATHA SURECLICK 140 MG/ML SOAJ INJECT 1 SYRINGE SUB-Q EVERY 2 WEEKS        potassium chloride (KLOR-CON M) 20 MEQ extended release tablet Take 1 tablet by mouth daily 30 tablet 5    furosemide (LASIX) 40 MG tablet Take 1 tablet by mouth daily 30 tablet 5    MOUNJARO 5 MG/0.5ML SOPN SC injection          nitroGLYCERIN (NITROSTAT) 0.4 MG SL tablet Place 1 tablet under the tongue every 5 minutes as needed for Chest pain 25 tablet 3    VENTOLIN  (90 Base) MCG/ACT inhaler Inhale 1 puff into the lungs 4 times daily as needed for Wheezing 1 Inhaler 3    vitamin D (ERGOCALCIFEROL) 85814 UNITS CAPS capsule Take 5,000 Units by mouth daily         metFORMIN (GLUCOPHAGE) 1000 MG tablet Take 1 tablet by mouth 2 times daily (with meals)        aspirin EC 81 MG EC tablet Take 1 tablet by mouth daily        atorvastatin (LIPITOR)

## 2025-05-06 NOTE — DISCHARGE INSTRUCTIONS
Future Appointments   Date Time Provider Department Center   5/13/2025  9:00 AM Ernesto Westbrook MD CARDIO SURG [facility]     POST-CATH  RADIAL DISCHARGE INSTRUCTIONS:    Please follow instructions closely for prevention of complications.     INSTRUCTIONS FOR CARE OF CATHETERIZATION SITE: RADIAL (WRIST) APPROACH:    DO NOT BEND wrist for 48 hours  DO NOT PUSH with affected wrist for 48 hrs.  DO NOT submerge wrist in water for 3 days  NO blood pressure, IV or blood work in affected arm for 3- 5 days    KEEP SPLINT (ARMBOARD) ON UNTIL TOMORROW MORNING  Remove dressing from wrist tomorrow morning. Gently cleanse, pat dry, apply band aid for 24 hours.    Call your physician if you notice:      *Increase in pain at catheterization site      *Increase in swelling at catheterization site      *Redness or drainage at the catheterization site      *Numbness, tingling or cramping in the catheterization arm at rest or with activity    CALL 911 if you have active bleeding from your catheterization site.   DO NOT DRIVE YOURSELF TO THE HOSPITAL    Drink 3-4 extra glasses of water today    No driving, or working for 48 hrs    Continue low fat diet.

## 2025-05-06 NOTE — PROGRESS NOTES
Patient brought out to CVL holding area post heart catheterization. Right radial vasc band without bleeding or hematoma. A&Ox4. Vitals stable. Tolerating PO intake.

## 2025-05-07 NOTE — PROGRESS NOTES
Cardiothoracic Surgery       Subjective     Patient ID: Patrick Pickering     CC: Fatigue     HPI:  Patrick Pickering is a 64 y.o. male with pertinent past medical history of CAD s/p PCI to LAD 10/28/18 with ALFREDO x 2, DM, HTN, HLD, MI in 2005 and 2010, and current cigarette smoker. He recently underwent a stress test due to complaints of fatigue over the last 3 months. This was abnormal; thus prompting a cardiac catheterization completed on 5/6/25, revealing severe triple-vessel CAD including recurrent ISR of the LAD stents in stents, and Mid to basal inferior wall akinesis with ejection fraction of 40-45% (see full report below). A TTE was completed on 4/11/25 with moderately reduced left ventricular systolic function with a visually estimated EF of 40 - 45% (see full report below). He denies all complaints other than fatigue, LE edema, and occasional lightheadedness. He presents to office to discuss possible surgical intervention.     [facility] 5/6/25:  Left Main   It is a long and large vessel with no significant angiographic stenosis noted.      Left Anterior Descending   It is a large vessel wrapping around the apex and giving rise to a bifurcating diagonal branch and several septal perforators. Stents in-stents were noted from the proximal to mid LAD with moderate diffuse in-stent restenosis but 80% ISR at the proximal end of the stent. There was 80% mid diagonal branch stenosis.      Left Circumflex   It is a large vessel giving rise to 2 small obtuse marginal branches, and probably an SA usama and AV usama branch. No significant angiographic stenosis noted. Ramus branch: It is large and bifurcating with moderate diffuse in-stent restenosis of the proximal to mid stent with 80% ISR at the proximal end of the stent. There was 80% discrete mid ramus stenosis prior to its bifurcation.      Right Coronary Artery   It is chronically

## 2025-05-13 ENCOUNTER — PREP FOR PROCEDURE (OUTPATIENT)
Dept: CARDIOTHORACIC SURGERY | Age: 65
End: 2025-05-13

## 2025-05-13 ENCOUNTER — INITIAL CONSULT (OUTPATIENT)
Dept: CARDIOTHORACIC SURGERY | Age: 65
End: 2025-05-13
Payer: MEDICAID

## 2025-05-13 VITALS — BODY MASS INDEX: 30.35 KG/M2 | HEIGHT: 70 IN | WEIGHT: 212 LBS

## 2025-05-13 DIAGNOSIS — I25.10 CAD IN NATIVE ARTERY: Primary | ICD-10-CM

## 2025-05-13 DIAGNOSIS — I25.118 CORONARY ARTERY DISEASE INVOLVING NATIVE HEART WITH OTHER FORM OF ANGINA PECTORIS, UNSPECIFIED VESSEL OR LESION TYPE: ICD-10-CM

## 2025-05-13 DIAGNOSIS — Z01.818 PREOP TESTING: Primary | ICD-10-CM

## 2025-05-13 DIAGNOSIS — E11.65 UNCONTROLLED TYPE 2 DIABETES MELLITUS WITH HYPERGLYCEMIA (HCC): ICD-10-CM

## 2025-05-13 DIAGNOSIS — I73.9 PVD (PERIPHERAL VASCULAR DISEASE): ICD-10-CM

## 2025-05-13 DIAGNOSIS — R09.89 BRUIT: ICD-10-CM

## 2025-05-13 PROCEDURE — 2022F DILAT RTA XM EVC RTNOPTHY: CPT | Performed by: THORACIC SURGERY (CARDIOTHORACIC VASCULAR SURGERY)

## 2025-05-13 PROCEDURE — G8427 DOCREV CUR MEDS BY ELIG CLIN: HCPCS | Performed by: THORACIC SURGERY (CARDIOTHORACIC VASCULAR SURGERY)

## 2025-05-13 PROCEDURE — 3017F COLORECTAL CA SCREEN DOC REV: CPT | Performed by: THORACIC SURGERY (CARDIOTHORACIC VASCULAR SURGERY)

## 2025-05-13 PROCEDURE — 3046F HEMOGLOBIN A1C LEVEL >9.0%: CPT | Performed by: THORACIC SURGERY (CARDIOTHORACIC VASCULAR SURGERY)

## 2025-05-13 PROCEDURE — 99204 OFFICE O/P NEW MOD 45 MIN: CPT | Performed by: THORACIC SURGERY (CARDIOTHORACIC VASCULAR SURGERY)

## 2025-05-13 PROCEDURE — G8417 CALC BMI ABV UP PARAM F/U: HCPCS | Performed by: THORACIC SURGERY (CARDIOTHORACIC VASCULAR SURGERY)

## 2025-05-13 PROCEDURE — 4004F PT TOBACCO SCREEN RCVD TLK: CPT | Performed by: THORACIC SURGERY (CARDIOTHORACIC VASCULAR SURGERY)

## 2025-05-13 RX ORDER — SODIUM CHLORIDE 0.9 % (FLUSH) 0.9 %
5-40 SYRINGE (ML) INJECTION PRN
Status: CANCELLED | OUTPATIENT
Start: 2025-05-13

## 2025-05-13 RX ORDER — SODIUM CHLORIDE 9 MG/ML
INJECTION, SOLUTION INTRAVENOUS PRN
Status: CANCELLED | OUTPATIENT
Start: 2025-05-13

## 2025-05-13 RX ORDER — SODIUM CHLORIDE 0.9 % (FLUSH) 0.9 %
5-40 SYRINGE (ML) INJECTION EVERY 12 HOURS SCHEDULED
Status: CANCELLED | OUTPATIENT
Start: 2025-05-13

## 2025-05-13 RX ORDER — SODIUM CHLORIDE 9 MG/ML
INJECTION, SOLUTION INTRAVENOUS CONTINUOUS
Status: CANCELLED | OUTPATIENT
Start: 2025-05-13

## 2025-05-13 NOTE — H&P
CC: Fatigue      HPI:  Patrick Pickering is a 64 y.o. male with pertinent past medical history of CAD s/p PCI to LAD 10/28/18 with ALFREDO x 2, DM, HTN, HLD, MI in 2005 and 2010, and current cigarette smoker. He recently underwent a stress test due to complaints of fatigue over the last 3 months. This was abnormal; thus prompting a cardiac catheterization completed on 5/6/25, revealing severe triple-vessel CAD including recurrent ISR of the LAD stents in stents, and Mid to basal inferior wall akinesis with ejection fraction of 40-45% (see full report below). A TTE was completed on 4/11/25 with moderately reduced left ventricular systolic function with a visually estimated EF of 40 - 45% (see full report below). He denies all complaints other than fatigue, LE edema, and occasional lightheadedness. He presents to office to discuss possible surgical intervention.      [facility] 5/6/25:  Left Main   It is a long and large vessel with no significant angiographic stenosis noted.      Left Anterior Descending   It is a large vessel wrapping around the apex and giving rise to a bifurcating diagonal branch and several septal perforators. Stents in-stents were noted from the proximal to mid LAD with moderate diffuse in-stent restenosis but 80% ISR at the proximal end of the stent. There was 80% mid diagonal branch stenosis.      Left Circumflex   It is a large vessel giving rise to 2 small obtuse marginal branches, and probably an SA usama and AV usama branch. No significant angiographic stenosis noted. Ramus branch: It is large and bifurcating with moderate diffuse in-stent restenosis of the proximal to mid stent with 80% ISR at the proximal end of the stent. There was 80% discrete mid ramus stenosis prior to its bifurcation.      Right Coronary Artery   It is chronically totally occluded at the proximal segment. An occluded stent was noted in the midsegment. The distal branches receives collaterals from the LAD and the circumflex  artery.         TTE 4/11/25:    Image quality is technically difficult. Contrast used: Lumason.    Left Ventricle: Moderately reduced left ventricular systolic function with a visually estimated EF of 40 - 45%. Left ventricle size is normal. Normal wall thickness. Moderate global hypokinesis present.    Right Ventricle: Right ventricle size is normal. Normal systolic function. TAPSE is 2.6 cm.    Left Atrium: Left atrial volume index is normal (16-34 mL/m2) mL/m2.    Mitral Valve: Mildly thickened leaflets. Trace regurgitation.    Pericardium: No pericardial effusion.           Past Medical History:   Diagnosis Date    CAD (coronary artery disease)     6 stents. Details unavailible    DM (diabetes mellitus) ([facility]     HTN (hypertension)     Hyperlipemia     MI (myocardial infarction) ([facility]) 2005/2010    stents 6     Past Surgical History:   Procedure Laterality Date    CARDIAC PROCEDURE N/A 5/6/2025    Left heart cath / coronary angiography performed by Veronika Edmonds MD [facility]    CORONARY ANGIOPLASTY WITH STENT PLACEMENT  10/27/2018    Dr. Zaragoza- Resolute McKenzie ALFREDO 2.25x38 Mid LAD, Resolute Azam ALFREDO 2.5x18 Prox LAD    KNEE SURGERY  1978     Social History     Socioeconomic History    Marital status:      Spouse name: Not on file    Number of children: Not on file    Years of education: Not on file    Highest education level: Not on file   Occupational History    Occupation: [employer]     Employer: [employer]   Tobacco Use    Smoking status: Every Day     Current packs/day: 1.50     Average packs/day: 1.5 packs/day for 45.0 years (67.5 ttl pk-yrs)     Types: Cigarettes     Start date: 5/13/1980    Smokeless tobacco: Never   Vaping Use    Vaping status: Never Used   Substance and Sexual Activity    Alcohol use: No    Drug use: No    Sexual activity: Not on file   Other Topics Concern    Not on file   Social History Narrative    Not on file     Social Drivers of Health     Financial

## 2025-05-13 NOTE — PROGRESS NOTES
Pt pre-op CABG, likely candidate for preoperative amiodarone for afib prophylaxis pending EKG/labs at PAT. Once determined will contact pt to discuss.    Cardiac book and ERAS pathway reviewed with pt. Currently smokes 1.5 packs/day. He is willing to cut down but refusing referral to tobacco treatment center at this time. He sees endocrinology but does not use his dexcom, however, he is willing to [facility].8. Pt also has very inconsistent bowel habits going every 3-4 days. I explained we would do imaging in PAT and call in lactulose prior for bowel prep. All questions answered.    STOP-Bang questionnaire score: 5  High risk TARUN 5-8  Recommended pt f/u with PCP for potential screening.    ANA Pierce, CV-BC, RN  ERAS Cardiac Clinical Coordinator

## 2025-05-14 NOTE — PROGRESS NOTES
[facility]   PRE-ADMISSION TESTING GENERAL INSTRUCTIONS  PAT Phone Number: [phone]      GENERAL INSTRUCTIONS:    [x] Hibiclens shower the night before AND the morning of surgery.   -Do not use Hibiclens on your face or head.  [x] Do not wear lotions, powders, deodorant the morning of surgery.  [x] Nothing to eat or drink after midnight. This includes no gum, candy, mints or water.  [x] You may brush your teeth, gargle, but do NOT swallow water.   [x] No tobacco products, illegal drugs, or alcohol within 24 hours of your surgery.  [x] Jewelry or valuables should not be brought to the hospital. All body and/or tongue piercing's must be removed prior to arriving to hospital. No contact lens or hair pins.   [x] Arrange transportation with a responsible adult  to and from the hospital.   [x] Bring insurance card and photo ID.  [x] Bring copy of living will or healthcare power of  papers to be placed in your electronic record.  [x] Transfusion (Green) Bracelet: Please bring with you to hospital, day of surgery.     PARKING INSTRUCTIONS:     [x] ARRIVAL DATE & TIME:  THURSDAY 5/29  @  0500 AM   [x] Times are subject to change. We will contact you the business day before surgery if that were to occur.  [x] Enter into the [facility] Entrance. Two people may accompany you. Masks are not required.  [x] Parking Lot \"I\" is where you will park. It is located on the corner of [address] and [address]. The entrance is on [address].   Only one vehicle - per patient, is permitted in parking lot.   Upon entering the parking lot, a voucher ticket will print.    EDUCATION INSTRUCTIONS:           [x] [facility] Pamphlet placed in chart.   [x] Pre-admission Testing educational folder given  [x] Incentive Spirometry,coughing & deep breathing exercises reviewed.   [x] Fluoroscopy-Xray used in surgery reviewed with patient. Educational pamphlet placed

## 2025-05-16 ENCOUNTER — HOSPITAL ENCOUNTER (EMERGENCY)
Dept: HOSPITAL 83 - ED | Age: 65
Discharge: HOME | End: 2025-05-16
Payer: COMMERCIAL

## 2025-05-16 DIAGNOSIS — Z79.899: ICD-10-CM

## 2025-05-16 DIAGNOSIS — Z79.4: ICD-10-CM

## 2025-05-16 DIAGNOSIS — R55: Primary | ICD-10-CM

## 2025-05-16 DIAGNOSIS — F17.200: ICD-10-CM

## 2025-05-16 DIAGNOSIS — Z98.890: ICD-10-CM

## 2025-05-16 DIAGNOSIS — E11.649: ICD-10-CM

## 2025-05-16 LAB
ALP SERPL-CCNC: 88 U/L (ref 46–116)
ALT SERPL W P-5'-P-CCNC: 17 U/L (ref 5–49)
APTT PPP: 26.5 SECONDS (ref 20–32.1)
BACTERIA #/AREA URNS HPF: (no result) /[HPF]
BASOPHILS # BLD AUTO: 0.1 10*3/UL (ref 0–0.1)
BASOPHILS NFR BLD AUTO: 0.3 % (ref 0–1)
BUN SERPL-MCNC: 12 MG/DL (ref 9–23)
CHLORIDE SERPL-SCNC: 102 MMOL/L (ref 98–107)
EOSINOPHIL # BLD AUTO: 0.1 10*3/UL (ref 0–0.4)
EOSINOPHIL # BLD AUTO: 0.7 % (ref 1–4)
ERYTHROCYTE [DISTWIDTH] IN BLOOD BY AUTOMATED COUNT: 13.2 % (ref 0–14.5)
ETHANOL SERPL-MCNC: < 3 MG/DL (ref ?–3)
HCT VFR BLD AUTO: 52.3 % (ref 42–52)
MCH RBC QN AUTO: 28.8 PG (ref 27–31)
MCHC RBC AUTO-ENTMCNC: 31.5 G/DL (ref 33–37)
MCV RBC AUTO: 91.3 FL (ref 80–94)
MONOCYTES # BLD AUTO: 1.3 10*3/UL (ref 0.1–1)
MONOCYTES NFR BLD MANUAL: 8.9 % (ref 3–9)
NEUT #: 11.4 10*3/UL (ref 2.3–7.9)
NEUTROPHILS NFR BLD AUTO: 79.6 % (ref 47–73)
PLATELET # BLD AUTO: 294 10*3/UL (ref 130–400)
PMV BLD AUTO: 10.3 FL (ref 9.6–12.3)
POTASSIUM SERPL-SCNC: 3.9 MMOL/L (ref 3.4–5.1)
PROT SERPL-MCNC: 7.5 GM/DL (ref 6–8)
RBC # BLD AUTO: 5.73 10*6/UL (ref 4.5–5.9)
RBC #/AREA URNS HPF: (no result) RBC/HPF (ref 0–2)
UROBILINOGEN UR STRIP-MCNC: 0.2 E.U./DL (ref 0–1)
WBC #/AREA URNS HPF: (no result) WBC/HPF (ref 0–5)
WBC NRBC COR # BLD AUTO: 14.3 10*3/UL (ref 4.8–10.8)

## 2025-05-19 ENCOUNTER — TELEPHONE (OUTPATIENT)
Dept: CARDIOTHORACIC SURGERY | Age: 65
End: 2025-05-19

## 2025-05-20 ENCOUNTER — APPOINTMENT (OUTPATIENT)
Dept: CT IMAGING | Age: 65
End: 2025-05-20
Attending: THORACIC SURGERY (CARDIOTHORACIC VASCULAR SURGERY)
Payer: MEDICAID

## 2025-05-22 ENCOUNTER — HOSPITAL ENCOUNTER (OUTPATIENT)
Dept: INTERVENTIONAL RADIOLOGY/VASCULAR | Age: 65
Discharge: HOME | End: 2025-05-24
Payer: MEDICAID

## 2025-05-22 ENCOUNTER — HOSPITAL ENCOUNTER (OUTPATIENT)
Dept: ULTRASOUND IMAGING | Age: 65
Discharge: HOME | End: 2025-05-24
Payer: MEDICAID

## 2025-05-22 ENCOUNTER — HOSPITAL ENCOUNTER (OUTPATIENT)
Dept: CT IMAGING | Age: 65
Discharge: HOME | End: 2025-05-24
Payer: MEDICAID

## 2025-05-22 ENCOUNTER — HOSPITAL ENCOUNTER (OUTPATIENT)
Dept: PREADMISSION TESTING | Age: 65
Discharge: HOME | End: 2025-05-22
Payer: MEDICAID

## 2025-05-22 VITALS
HEIGHT: 70 IN | OXYGEN SATURATION: 95 % | TEMPERATURE: 97.9 F | HEART RATE: 79 BPM | SYSTOLIC BLOOD PRESSURE: 115 MMHG | RESPIRATION RATE: 20 BRPM | BODY MASS INDEX: 30.51 KG/M2 | DIASTOLIC BLOOD PRESSURE: 70 MMHG | WEIGHT: 213.1 LBS

## 2025-05-22 DIAGNOSIS — Z01.818 PREOP TESTING: ICD-10-CM

## 2025-05-22 DIAGNOSIS — I25.118 CORONARY ARTERY DISEASE INVOLVING NATIVE HEART WITH OTHER FORM OF ANGINA PECTORIS, UNSPECIFIED VESSEL OR LESION TYPE: ICD-10-CM

## 2025-05-22 DIAGNOSIS — I73.9 PVD (PERIPHERAL VASCULAR DISEASE): ICD-10-CM

## 2025-05-22 DIAGNOSIS — I25.10 CAD IN NATIVE ARTERY: ICD-10-CM

## 2025-05-22 DIAGNOSIS — R09.89 BRUIT: ICD-10-CM

## 2025-05-22 LAB
ABO + RH BLD: NORMAL
ALBUMIN SERPL-MCNC: 3.7 G/DL (ref 3.5–5.2)
ALP SERPL-CCNC: 84 U/L (ref 40–129)
ALT SERPL-CCNC: 15 U/L (ref 0–50)
ANION GAP SERPL CALCULATED.3IONS-SCNC: 8 MMOL/L (ref 7–16)
ARM BAND NUMBER: NORMAL
AST SERPL-CCNC: 23 U/L (ref 0–50)
BILIRUB SERPL-MCNC: <0.2 MG/DL (ref 0–1.2)
BILIRUB UR QL STRIP: NEGATIVE
BLOOD BANK SAMPLE EXPIRATION: NORMAL
BLOOD GROUP ANTIBODIES SERPL: NEGATIVE
BNP SERPL-MCNC: 340 PG/ML (ref 0–125)
BUN SERPL-MCNC: 13 MG/DL (ref 8–23)
CALCIUM SERPL-MCNC: 9.5 MG/DL (ref 8.8–10.2)
CHLORIDE SERPL-SCNC: 104 MMOL/L (ref 98–107)
CLARITY UR: CLEAR
CO2 SERPL-SCNC: 26 MMOL/L (ref 22–29)
COLOR UR: YELLOW
COMMENT: ABNORMAL
CREAT SERPL-MCNC: 0.8 MG/DL (ref 0.7–1.2)
EKG ATRIAL RATE: 73 BPM
EKG P AXIS: 73 DEGREES
EKG P-R INTERVAL: 158 MS
EKG Q-T INTERVAL: 356 MS
EKG QRS DURATION: 100 MS
EKG QTC CALCULATION (BAZETT): 392 MS
EKG R AXIS: 11 DEGREES
EKG T AXIS: 6 DEGREES
EKG VENTRICULAR RATE: 73 BPM
ERYTHROCYTE [DISTWIDTH] IN BLOOD BY AUTOMATED COUNT: 13.3 % (ref 11.5–15)
GFR, ESTIMATED: >90 ML/MIN/1.73M2
GLUCOSE SERPL-MCNC: 228 MG/DL (ref 74–99)
GLUCOSE UR STRIP-MCNC: 250 MG/DL
HBA1C MFR BLD: 8.9 % (ref 4–5.6)
HCT VFR BLD AUTO: 46.7 % (ref 37–54)
HGB BLD-MCNC: 15.5 G/DL (ref 12.5–16.5)
HGB UR QL STRIP.AUTO: NEGATIVE
INR PPP: 0.9
KETONES UR STRIP-MCNC: NEGATIVE MG/DL
LEUKOCYTE ESTERASE UR QL STRIP: NEGATIVE
MCH RBC QN AUTO: 29.4 PG (ref 26–35)
MCHC RBC AUTO-ENTMCNC: 33.2 G/DL (ref 32–34.5)
MCV RBC AUTO: 88.6 FL (ref 80–99.9)
NITRITE UR QL STRIP: NEGATIVE
PARTIAL THROMBOPLASTIN TIME: 33.9 SEC (ref 24.5–35.1)
PH UR STRIP: 5.5 [PH] (ref 5–8)
PLATELET # BLD AUTO: 243 K/UL (ref 130–450)
PMV BLD AUTO: 10.4 FL (ref 7–12)
POTASSIUM SERPL-SCNC: 4.6 MMOL/L (ref 3.5–5.1)
PROT SERPL-MCNC: 6.6 G/DL (ref 6.4–8.3)
PROT UR STRIP-MCNC: NEGATIVE MG/DL
PROTHROMBIN TIME: 9.9 SEC (ref 9.3–12.4)
RBC # BLD AUTO: 5.27 M/UL (ref 3.8–5.8)
SODIUM SERPL-SCNC: 138 MMOL/L (ref 136–145)
SP GR UR STRIP: 1.01 (ref 1–1.03)
T4 FREE SERPL-MCNC: 1.2 NG/DL (ref 0.9–1.7)
TSH SERPL DL<=0.05 MIU/L-ACNC: 2.44 UIU/ML (ref 0.27–4.2)
UROBILINOGEN UR STRIP-ACNC: 0.2 EU/DL (ref 0–1)
WBC OTHER # BLD: 7 K/UL (ref 4.5–11.5)

## 2025-05-22 PROCEDURE — 87086 URINE CULTURE/COLONY COUNT: CPT

## 2025-05-22 PROCEDURE — 87081 CULTURE SCREEN ONLY: CPT

## 2025-05-22 PROCEDURE — 85610 PROTHROMBIN TIME: CPT

## 2025-05-22 PROCEDURE — 93970 EXTREMITY STUDY: CPT

## 2025-05-22 PROCEDURE — 94729 DIFFUSING CAPACITY: CPT

## 2025-05-22 PROCEDURE — 86850 RBC ANTIBODY SCREEN: CPT

## 2025-05-22 PROCEDURE — 80053 COMPREHEN METABOLIC PANEL: CPT

## 2025-05-22 PROCEDURE — 85730 THROMBOPLASTIN TIME PARTIAL: CPT

## 2025-05-22 PROCEDURE — 86900 BLOOD TYPING SEROLOGIC ABO: CPT

## 2025-05-22 PROCEDURE — 71250 CT THORAX DX C-: CPT

## 2025-05-22 PROCEDURE — 84443 ASSAY THYROID STIM HORMONE: CPT

## 2025-05-22 PROCEDURE — 74176 CT ABD & PELVIS W/O CONTRAST: CPT

## 2025-05-22 PROCEDURE — 85027 COMPLETE CBC AUTOMATED: CPT

## 2025-05-22 PROCEDURE — 93923 UPR/LXTR ART STDY 3+ LVLS: CPT

## 2025-05-22 PROCEDURE — 93880 EXTRACRANIAL BILAT STUDY: CPT

## 2025-05-22 PROCEDURE — 93922 UPR/L XTREMITY ART 2 LEVELS: CPT

## 2025-05-22 PROCEDURE — 93010 ELECTROCARDIOGRAM REPORT: CPT | Performed by: INTERNAL MEDICINE

## 2025-05-22 PROCEDURE — 84439 ASSAY OF FREE THYROXINE: CPT

## 2025-05-22 PROCEDURE — 86901 BLOOD TYPING SEROLOGIC RH(D): CPT

## 2025-05-22 PROCEDURE — 94375 RESPIRATORY FLOW VOLUME LOOP: CPT

## 2025-05-22 PROCEDURE — 93922 UPR/L XTREMITY ART 2 LEVELS: CPT | Performed by: SURGERY

## 2025-05-22 PROCEDURE — 93005 ELECTROCARDIOGRAM TRACING: CPT

## 2025-05-22 PROCEDURE — 93923 UPR/LXTR ART STDY 3+ LVLS: CPT | Performed by: SURGERY

## 2025-05-22 PROCEDURE — 36415 COLL VENOUS BLD VENIPUNCTURE: CPT

## 2025-05-22 PROCEDURE — 83036 HEMOGLOBIN GLYCOSYLATED A1C: CPT

## 2025-05-22 PROCEDURE — 83880 ASSAY OF NATRIURETIC PEPTIDE: CPT

## 2025-05-22 PROCEDURE — 81003 URINALYSIS AUTO W/O SCOPE: CPT

## 2025-05-22 RX ORDER — AMIODARONE HYDROCHLORIDE 200 MG/1
400 TABLET ORAL 2 TIMES DAILY
Qty: 16 TABLET | Refills: 0 | Status: SHIPPED | OUTPATIENT
Start: 2025-05-25 | End: 2025-05-29

## 2025-05-22 RX ORDER — LACTULOSE 10 G/15ML
10 SOLUTION ORAL EVERY EVENING
Qty: 105 ML | Refills: 0 | Status: SHIPPED | OUTPATIENT
Start: 2025-05-22 | End: 2025-05-29

## 2025-05-22 NOTE — PROGRESS NOTES
Chart reviewed. Patient did not meet any exclusion criteria for pre-operative oral amiodarone. EKG completed on 5/22 - reviewed. EKG with QTc 392. Amiodarone e-scribed to patient's pharmacy. Education on rationale provided to patient by the [facility] Cardiac Clinical Coordinator.     Lactulose escribed as well      NANETTE Alas

## 2025-05-22 NOTE — PROCEDURES
[facility]              [address] [place] [address]                           PULMONARY FUNCTION      PATIENT NAME: VALDO STONE               : 1960  MED REC NO: [id number]                        ROOM:   ACCOUNT NO: [id number]                       ADMIT DATE: 2025  PROVIDER: Rajinder Bailey MD      DATE OF PROCEDURE: 2025    SURGEON:  Rajinder Bailey MD    REFERRING PHYSICIAN:  CÉSAR ORTEGA    INTERPRETING PHYSICIAN:  Rajinder Bailey M.D.    CLINICAL INDICATION:  Preoperative evaluation, dyspnea walking more than 100 yards.    PROCEDURE:  Pulmonary function test.    PURPOSE:  Diagnostic.    MEDICATIONS:  None listed.    Height 70 inches, weight 212 pounds.  Age 64, male.  Active smoker, 0.75 packs per day x46 years.    COMMENTS:  Good patient effort and cooperation.    LUNG MECHANICS:  There appears to be a moderate-to-severe obstructive component of lung disease according to graphic and numeric representation of patient lung volumes.      Pattern of flow volume loop supports this.    FEV1/FVC is 0.45.    FEV1 1.59 L, 45% of predicted with a Z-score of -3.38.    FVC 3.52 L, 77% of predicted with a Z-score of -1.52.    MVV 29% of predicted.    No bronchodilator effect completed.    Inspiratory phase of flow volume loop shows adequate upper airway laryngeal and pharyngeal function.  Expiratory phase of flow volume loop shows hyperdynamic collapse in the large and the distal peripheral airways.    LUNG VOLUMES:  Not completed.    LUNG DIFFUSION:  DLCO corrected for alveolar volume is normal at 115%, which reflects a normal parenchymal based gas exchange process.    CLINICAL IMPRESSION:  Clinically most compatible with a moderate-to-severe obstructive component of lung disease; however, lack of bronchodilator effect and lack of lung volumes limit interpretation of clinical data.  DLCO was normal, so this is not chronic obstructive

## 2025-05-23 ENCOUNTER — HOSPITAL ENCOUNTER (OUTPATIENT)
Dept: PULMONOLOGY | Age: 65
Discharge: HOME | End: 2025-05-23
Payer: MEDICAID

## 2025-05-23 ENCOUNTER — TELEPHONE (OUTPATIENT)
Dept: CARDIOTHORACIC SURGERY | Age: 65
End: 2025-05-23

## 2025-05-23 DIAGNOSIS — I25.118 CORONARY ARTERY DISEASE INVOLVING NATIVE HEART WITH OTHER FORM OF ANGINA PECTORIS, UNSPECIFIED VESSEL OR LESION TYPE: ICD-10-CM

## 2025-05-23 DIAGNOSIS — Z01.818 PREOP TESTING: ICD-10-CM

## 2025-05-23 LAB
MICROORGANISM SPEC CULT: NO GROWTH
MICROORGANISM SPEC CULT: NORMAL
SERVICE CMNT-IMP: NORMAL
SPECIMEN DESCRIPTION: NORMAL
SPECIMEN DESCRIPTION: NORMAL

## 2025-05-23 NOTE — TELEPHONE ENCOUNTER
Called and spoke with Mr Pickering re: being approved for pre-op amiodarone for afib prophylaxis as discussed in office consultation. Reminded pt to refer back to amiodarone handout provided in office discussing s/s to look out for and report. They are to take 2 tabs BID and initiate on Sunday 5/25/25 AM and end on Wednesday 5/28/25 PM. No amio on day of OR. He is to also  the bowel prep lactulose we discussed in office. He is to start Monday 5/26/25 and take daily unless he develops diarrhea. Reinforced importance of compliance with pre-op carb loading drinks (Ensure) along with adequate glucose control. Pt states he was at [facility] ER for hypogylcemia and meds were adjusted. I asked if he set up an appt with our endocrinologist referral and he states he declined because he wants to stick with his endo in [place]. I encouraged him to make a f/u appt especially in light of his recent ER visit. All questions answered.    KAMILA PierceN, CV-BC, RN  [facility] Cardiac Clinical Coordinator

## 2025-05-27 ENCOUNTER — ANESTHESIA EVENT (OUTPATIENT)
Dept: OPERATING ROOM | Age: 65
End: 2025-05-27
Payer: MEDICAID

## 2025-05-28 NOTE — PROGRESS NOTES
Planned Operative Procedure: CABG to LAD and OM1 with radial  Surgeon: Ernesto Westbrook MD      Pertinent Cardiac History: CAD s/p PCI to LAD 10/28/18 with ALFREDO x 2, DM, HTN, HLD, MI in 2005 and 2010, and current cigarette smoker     Estimated body mass index is 30.58 kg/m² as calculated from the following:    Height as of 5/14/25: 1.778 m (5' 10\").    Weight as of 5/22/25: 96.7 kg (213 lb 1.6 oz).      Pre-Operative Testing Review:   --Carotid US: no significant stenosis bilaterally   --DARIAN: good flow bilaterally  --Chest CT: reviewed  --Abd CT: sigmoid diverticulosis without acute diverticulitis   --TTE: moderately reduced LV with EF ~40-45%, no significant valvular abnormalities  --PFT: FEV1 45 percent of predicted and DLCOunc 98 percent of predicted  --Vascular duplex LE vein mapping: completed and read  --Vascular UE arterial segmental pressures with PPG: good collateral flow through ulnar arteries bilaterally   --Bilateral arm blood pressures with MAP: BP right arm 117/66  MAP 83  BP left arm 115/70    MAP 85      Pre-Operative Pertinent History/Order Review:   --Does the patient have a history of atrial fibrillation/flutter?: No  --Did the patient received pre-operative oral amiodarone?: Yes    --History of DM or newly diagnosed (newly diagnosed if HgA1c >6.5): Yes    --H&P: in chart from date 5/13, to be updated DOS      Recent Labs   --T4 result: 1.2    Recent Labs     05/22/25  0735   LABA1C 8.9*     Recent Labs     05/22/25  0735 05/16/25  1423 05/05/25  1155   HGB 15.5 16.5 16.3   HCT 46.7 52.3* 51.8    294 263     Recent Labs     05/22/25  0735 05/16/25  1423 05/05/25  1155   BUN 13 12 14   CREATININE 0.8 1.03 0.94     Recent Labs     05/22/25  0735   TSH 2.44   PROBNP 340*       Results       Procedure Component Value Units Date/Time    Culture, MRSA, [id number] Collected: 05/22/25 0735    Order Status: Completed Specimen: Nares Updated: 05/23/25 1024

## 2025-05-28 NOTE — PROGRESS NOTES
Dr. Lim notified patient gave himself his Mourjaro injection yesterday 5/27/25, although he was instructed to hold until after surgery. Dr. Lim is okay with proceeding with surgery tomorrow 5/29/25 and stated anesthesia on tomorrow will need to use RSI. CTS aware as well.  Hanna Hartman RN

## 2025-05-29 ENCOUNTER — HOSPITAL ENCOUNTER (INPATIENT)
Age: 65
LOS: 7 days | Discharge: HOME HEALTH | DRG: 166 | End: 2025-06-05
Attending: THORACIC SURGERY (CARDIOTHORACIC VASCULAR SURGERY) | Admitting: THORACIC SURGERY (CARDIOTHORACIC VASCULAR SURGERY)
Payer: MEDICAID

## 2025-05-29 ENCOUNTER — APPOINTMENT (OUTPATIENT)
Dept: GENERAL RADIOLOGY | Age: 65
DRG: 166 | End: 2025-05-29
Attending: THORACIC SURGERY (CARDIOTHORACIC VASCULAR SURGERY)
Payer: MEDICAID

## 2025-05-29 ENCOUNTER — ANESTHESIA (OUTPATIENT)
Dept: OPERATING ROOM | Age: 65
End: 2025-05-29
Payer: MEDICAID

## 2025-05-29 DIAGNOSIS — I50.22 HEART FAILURE WITH MID-RANGE EJECTION FRACTION (HFMEF) (HCC): ICD-10-CM

## 2025-05-29 DIAGNOSIS — Z95.1 S/P CABG (CORONARY ARTERY BYPASS GRAFT): ICD-10-CM

## 2025-05-29 DIAGNOSIS — G89.18 ACUTE POST-OPERATIVE PAIN: ICD-10-CM

## 2025-05-29 DIAGNOSIS — Z95.1 STATUS POST CORONARY ARTERY BYPASS GRAFT: Primary | ICD-10-CM

## 2025-05-29 PROBLEM — I25.118 CORONARY ARTERY DISEASE INVOLVING NATIVE CORONARY ARTERY OF NATIVE HEART WITH OTHER FORM OF ANGINA PECTORIS: Status: ACTIVE | Noted: 2025-05-29

## 2025-05-29 PROBLEM — I95.81 POSTOPERATIVE HYPOTENSION: Status: ACTIVE | Noted: 2025-05-29

## 2025-05-29 PROBLEM — T81.9XXA COMPLICATION OF SURGICAL PROCEDURE: Status: ACTIVE | Noted: 2025-05-29

## 2025-05-29 LAB
AADO2: 134.2 MMHG
AADO2: 169.4 MMHG
AADO2: 202.6 MMHG
ACTIVATED CLOTTING TIME, HIGH RANGE: 121 SEC
ACTIVATED CLOTTING TIME, HIGH RANGE: 419 SEC
ACTIVATED CLOTTING TIME, HIGH RANGE: 486 SEC
ACTIVATED CLOTTING TIME, HIGH RANGE: 521 SEC
ACTIVATED CLOTTING TIME, HIGH RANGE: 76 SEC
ANION GAP SERPL CALCULATED.3IONS-SCNC: 9 MMOL/L (ref 7–16)
B.E.: -1.7 MMOL/L (ref -3–3)
B.E.: -2.4 MMOL/L (ref -3–3)
B.E.: -3.8 MMOL/L (ref -3–3)
B.E.: -5.6 MMOL/L (ref -3–3)
B.E.: -6.9 MMOL/L (ref -3–3)
B.E.: -7 MMOL/L (ref -3–3)
BUN BLD-MCNC: 16 MG/DL (ref 6–23)
BUN BLD-MCNC: 17 MG/DL (ref 6–23)
BUN SERPL-MCNC: 15 MG/DL (ref 8–23)
CA-I BLD-SCNC: 1.12 MMOL/L (ref 1.15–1.33)
CA-I BLD-SCNC: 1.17 MMOL/L (ref 1.15–1.33)
CA-I BLD-SCNC: 1.23 MMOL/L (ref 1.15–1.33)
CA-I BLD-SCNC: 1.32 MMOL/L (ref 1.15–1.33)
CA-I BLD-SCNC: 1.8 MMOL/L (ref 1.15–1.33)
CALCIUM SERPL-MCNC: 8.9 MG/DL (ref 8.8–10.2)
CHLORIDE BLD-SCNC: 102 MMOL/L (ref 100–108)
CHLORIDE BLD-SCNC: 102 MMOL/L (ref 100–108)
CHLORIDE BLD-SCNC: 108 MMOL/L (ref 100–108)
CHLORIDE BLD-SCNC: 108 MMOL/L (ref 100–108)
CHLORIDE SERPL-SCNC: 107 MMOL/L (ref 98–107)
CO2 BLD CALC-SCNC: 21 MMOL/L (ref 22–29)
CO2 BLD CALC-SCNC: 22 MMOL/L (ref 22–29)
CO2 BLD CALC-SCNC: 22 MMOL/L (ref 22–29)
CO2 BLD CALC-SCNC: 23 MMOL/L (ref 22–29)
CO2 SERPL-SCNC: 19 MMOL/L (ref 22–29)
COHB: 0.5 % (ref 0–1.5)
COHB: 0.6 % (ref 0–1.5)
CREAT BLD-MCNC: 0.8 MG/DL (ref 0.7–1.2)
CREAT BLD-MCNC: 0.8 MG/DL (ref 0.7–1.2)
CREAT BLD-MCNC: 0.9 MG/DL (ref 0.7–1.2)
CREAT BLD-MCNC: 0.9 MG/DL (ref 0.7–1.2)
CREAT SERPL-MCNC: 1 MG/DL (ref 0.7–1.2)
CRITICAL: ABNORMAL
CRITICAL: NORMAL
CRITICAL: NORMAL
DATE ANALYZED: ABNORMAL
DATE ANALYZED: NORMAL
DATE ANALYZED: NORMAL
DATE OF COLLECTION: ABNORMAL
DATE OF COLLECTION: NORMAL
DATE OF COLLECTION: NORMAL
EGFR, POC: >90 ML/MIN/1.73M2
ERYTHROCYTE [DISTWIDTH] IN BLOOD BY AUTOMATED COUNT: 13.2 % (ref 11.5–15)
FIO2: 40 %
FIO2: 50 %
FIO2: 60 %
GFR, ESTIMATED: 90 ML/MIN/1.73M2
GLUCOSE BLD-MCNC: 100 MG/DL (ref 74–99)
GLUCOSE BLD-MCNC: 139 MG/DL (ref 74–99)
GLUCOSE BLD-MCNC: 139 MG/DL (ref 74–99)
GLUCOSE BLD-MCNC: 143 MG/DL (ref 74–99)
GLUCOSE BLD-MCNC: 150 MG/DL (ref 74–99)
GLUCOSE BLD-MCNC: 181 MG/DL (ref 74–99)
GLUCOSE BLD-MCNC: 218 MG/DL (ref 74–99)
GLUCOSE BLD-MCNC: 221 MG/DL (ref 74–99)
GLUCOSE BLD-MCNC: 227 MG/DL (ref 74–99)
GLUCOSE BLD-MCNC: 251 MG/DL (ref 74–99)
GLUCOSE BLD-MCNC: 271 MG/DL (ref 74–99)
GLUCOSE BLD-MCNC: 276 MG/DL (ref 74–99)
GLUCOSE BLD-MCNC: 282 MG/DL (ref 74–99)
GLUCOSE BLD-MCNC: 292 MG/DL (ref 74–99)
GLUCOSE BLD-MCNC: 308 MG/DL (ref 74–99)
GLUCOSE SERPL-MCNC: 195 MG/DL (ref 74–99)
HCO3: 18.4 MMOL/L (ref 22–26)
HCO3: 19.2 MMOL/L (ref 22–26)
HCO3: 20.7 MMOL/L (ref 22–26)
HCO3: 21.1 MMOL/L (ref 22–26)
HCO3: 22.7 MMOL/L (ref 22–26)
HCO3: 22.8 MMOL/L (ref 22–26)
HCT VFR BLD AUTO: 31 % (ref 37–54)
HCT VFR BLD AUTO: 32 % (ref 37–54)
HCT VFR BLD AUTO: 37.4 % (ref 37–54)
HCT VFR BLD AUTO: 42 % (ref 37–54)
HCT VFR BLD AUTO: 42 % (ref 37–54)
HCT VFR BLD AUTO: 44 % (ref 37–54)
HGB BLD-MCNC: 11.9 G/DL (ref 12.5–16.5)
HGB BLD-MCNC: 14.2 G/DL (ref 12.5–16.5)
HHB: 1.6 % (ref 0–5)
HHB: 1.7 % (ref 0–5)
HHB: 2.4 % (ref 0–5)
HHB: 2.8 % (ref 0–5)
HHB: 3.8 % (ref 0–5)
HHB: 3.9 % (ref 0–5)
INR PPP: 1.1
LAB: ABNORMAL
LAB: NORMAL
LAB: NORMAL
Lab: 1110
Lab: 1320
Lab: 1430
Lab: 1553
Lab: 1739
Lab: 2010
MAGNESIUM SERPL-MCNC: 3 MG/DL (ref 1.6–2.4)
MCH RBC QN AUTO: 29.3 PG (ref 26–35)
MCHC RBC AUTO-ENTMCNC: 32.3 G/DL (ref 32–34.5)
MCV RBC AUTO: 90.7 FL (ref 80–99.9)
METHB: 0.2 % (ref 0–1.5)
METHB: 0.3 % (ref 0–1.5)
MODE: ABNORMAL
MODE: ABNORMAL
MODE: AC
MODE: AC
MODE: NORMAL
MODE: NORMAL
NEGATIVE BASE EXCESS, ART: 1.5 MMOL/L
NEGATIVE BASE EXCESS, ART: 2.2 MMOL/L
NEGATIVE BASE EXCESS, ART: 3.4 MMOL/L
NEGATIVE BASE EXCESS, ART: 4.4 MMOL/L
O2 SATURATION: 96.1 % (ref 92–98.5)
O2 SATURATION: 96.2 % (ref 92–98.5)
O2 SATURATION: 97.2 % (ref 92–98.5)
O2 SATURATION: 97.6 % (ref 92–98.5)
O2 SATURATION: 98.3 % (ref 92–98.5)
O2 SATURATION: 98.4 % (ref 92–98.5)
O2HB: 95.3 % (ref 94–97)
O2HB: 95.4 % (ref 94–97)
O2HB: 96.3 % (ref 94–97)
O2HB: 96.7 % (ref 94–97)
O2HB: 97.5 % (ref 94–97)
O2HB: 97.6 % (ref 94–97)
OPERATOR ID: 1868
OPERATOR ID: ABNORMAL
OPERATOR ID: ABNORMAL
PARTIAL THROMBOPLASTIN TIME: 32.3 SEC (ref 24.5–35.1)
PATIENT TEMP: 37 C
PCO2: 36.2 MMHG (ref 35–45)
PCO2: 37.6 MMHG (ref 35–45)
PCO2: 37.9 MMHG (ref 35–45)
PCO2: 40.3 MMHG (ref 35–45)
PCO2: 41.4 MMHG (ref 35–45)
PCO2: 43.1 MMHG (ref 35–45)
PEEP/CPAP: 8 CMH2O
PFO2: 2.7 MMHG/%
PFO2: 2.71 MMHG/%
PFO2: 2.81 MMHG/%
PH BLOOD GAS: 7.29 (ref 7.35–7.45)
PH BLOOD GAS: 7.3 (ref 7.35–7.45)
PH BLOOD GAS: 7.32 (ref 7.35–7.45)
PH BLOOD GAS: 7.37 (ref 7.35–7.45)
PH BLOOD GAS: 7.37 (ref 7.35–7.45)
PH BLOOD GAS: 7.4 (ref 7.35–7.45)
PLATELET # BLD AUTO: 229 K/UL (ref 130–450)
PMV BLD AUTO: 10.8 FL (ref 7–12)
PO2: 101.8 MMHG (ref 75–100)
PO2: 107.8 MMHG (ref 75–100)
PO2: 140.5 MMHG (ref 75–100)
PO2: 162.8 MMHG (ref 75–100)
PO2: 84.4 MMHG (ref 75–100)
PO2: 85.9 MMHG (ref 75–100)
POC ANION GAP: 12 MMOL/L (ref 7–16)
POC ANION GAP: 14 MMOL/L (ref 7–16)
POC ANION GAP: 9 MMOL/L (ref 7–16)
POC ANION GAP: 9 MMOL/L (ref 7–16)
POC HCO3: 21.6 MMOL/L (ref 22–26)
POC HCO3: 22.5 MMOL/L (ref 22–26)
POC HCO3: 23 MMOL/L (ref 22–26)
POC HCO3: 24 MMOL/L (ref 22–26)
POC HEMOGLOBIN (CALC): 10.7 G/DL (ref 12.5–15.5)
POC HEMOGLOBIN (CALC): 11 G/DL (ref 12.5–15.5)
POC HEMOGLOBIN (CALC): 14.2 G/DL (ref 12.5–15.5)
POC HEMOGLOBIN (CALC): 14.3 G/DL (ref 12.5–15.5)
POC LACTIC ACID: 0.8 MMOL/L (ref 0.5–2.2)
POC LACTIC ACID: 0.9 MMOL/L (ref 0.5–2.2)
POC LACTIC ACID: 1.9 MMOL/L (ref 0.5–2.2)
POC LACTIC ACID: <0.3 MMOL/L (ref 0.5–2.2)
POC O2 SATURATION: 100 % (ref 92–98.5)
POC O2 SATURATION: 97.1 % (ref 92–98.5)
POC O2 SATURATION: 99.6 % (ref 92–98.5)
POC O2 SATURATION: 99.9 % (ref 92–98.5)
POC PCO2: 37.7 MM HG (ref 35–45)
POC PCO2: 37.8 MM HG (ref 35–45)
POC PCO2: 42.2 MM HG (ref 35–45)
POC PCO2: 50.1 MM HG (ref 35–45)
POC PH: 7.27 (ref 7.35–7.45)
POC PH: 7.36 (ref 7.35–7.45)
POC PH: 7.37 (ref 7.35–7.45)
POC PH: 7.38 (ref 7.35–7.45)
POC PO2: 105 MM HG (ref 60–80)
POC PO2: 194.7 MM HG (ref 60–80)
POC PO2: 293.7 MM HG (ref 60–80)
POC PO2: 379.2 MM HG (ref 60–80)
POTASSIUM BLD-SCNC: 3.9 MMOL/L (ref 3.5–5)
POTASSIUM BLD-SCNC: 3.9 MMOL/L (ref 3.5–5)
POTASSIUM BLD-SCNC: 4.4 MMOL/L (ref 3.5–5)
POTASSIUM BLD-SCNC: 4.4 MMOL/L (ref 3.5–5)
POTASSIUM SERPL-SCNC: 3.5 MMOL/L (ref 3.5–5)
POTASSIUM SERPL-SCNC: 3.6 MMOL/L (ref 3.5–5.1)
POTASSIUM SERPL-SCNC: 3.9 MMOL/L (ref 3.5–5)
POTASSIUM SERPL-SCNC: 3.9 MMOL/L (ref 3.5–5.1)
POTASSIUM SERPL-SCNC: 3.9 MMOL/L (ref 3.5–5.1)
PROTHROMBIN TIME: 12.2 SEC (ref 9.3–12.4)
PS: 8 CMH20
RBC # BLD AUTO: 4.85 M/UL (ref 3.8–5.8)
RI(T): 1.21
RI(T): 1.24
RI(T): 1.24
RR MECHANICAL: 14 B/MIN
RR MECHANICAL: 14 B/MIN
SODIUM BLD-SCNC: 136 MMOL/L (ref 132–146)
SODIUM BLD-SCNC: 137 MMOL/L (ref 132–146)
SODIUM BLD-SCNC: 139 MMOL/L (ref 132–146)
SODIUM BLD-SCNC: 140 MMOL/L (ref 132–146)
SODIUM SERPL-SCNC: 136 MMOL/L (ref 136–145)
SOURCE, BLOOD GAS: ABNORMAL
SOURCE, BLOOD GAS: NORMAL
SOURCE, BLOOD GAS: NORMAL
THB: 12.4 G/DL (ref 11.5–16.5)
THB: 12.4 G/DL (ref 11.5–16.5)
THB: 12.5 G/DL (ref 11.5–16.5)
THB: 12.9 G/DL (ref 11.5–16.5)
THB: 13.3 G/DL (ref 11.5–16.5)
THB: 14.1 G/DL (ref 11.5–16.5)
TIME ANALYZED: 1114
TIME ANALYZED: 1321
TIME ANALYZED: 1437
TIME ANALYZED: 1606
TIME ANALYZED: 1755
TIME ANALYZED: 2027
VT MECHANICAL: 550 ML
VT MECHANICAL: 550 ML
WBC OTHER # BLD: 27.7 K/UL (ref 4.5–11.5)

## 2025-05-29 PROCEDURE — C1729 CATH, DRAINAGE: HCPCS | Performed by: THORACIC SURGERY (CARDIOTHORACIC VASCULAR SURGERY)

## 2025-05-29 PROCEDURE — P9045 ALBUMIN (HUMAN), 5%, 250 ML: HCPCS

## 2025-05-29 PROCEDURE — 02L70CK OCCLUSION OF LEFT ATRIAL APPENDAGE WITH EXTRALUMINAL DEVICE, OPEN APPROACH: ICD-10-PCS | Performed by: THORACIC SURGERY (CARDIOTHORACIC VASCULAR SURGERY)

## 2025-05-29 PROCEDURE — 82803 BLOOD GASES ANY COMBINATION: CPT

## 2025-05-29 PROCEDURE — 02100Z9 BYPASS CORONARY ARTERY, ONE ARTERY FROM LEFT INTERNAL MAMMARY, OPEN APPROACH: ICD-10-PCS | Performed by: THORACIC SURGERY (CARDIOTHORACIC VASCULAR SURGERY)

## 2025-05-29 PROCEDURE — 2500000003 HC RX 250 WO HCPCS

## 2025-05-29 PROCEDURE — 36415 COLL VENOUS BLD VENIPUNCTURE: CPT

## 2025-05-29 PROCEDURE — 94640 AIRWAY INHALATION TREATMENT: CPT

## 2025-05-29 PROCEDURE — 99232 SBSQ HOSP IP/OBS MODERATE 35: CPT | Performed by: NURSE PRACTITIONER

## 2025-05-29 PROCEDURE — 7100000001 HC PACU RECOVERY - ADDTL 15 MIN

## 2025-05-29 PROCEDURE — 84132 ASSAY OF SERUM POTASSIUM: CPT

## 2025-05-29 PROCEDURE — 6360000002 HC RX W HCPCS

## 2025-05-29 PROCEDURE — 6370000000 HC RX 637 (ALT 250 FOR IP): Performed by: NURSE PRACTITIONER

## 2025-05-29 PROCEDURE — 83735 ASSAY OF MAGNESIUM: CPT

## 2025-05-29 PROCEDURE — 33534 CABG ARTERIAL TWO: CPT | Performed by: THORACIC SURGERY (CARDIOTHORACIC VASCULAR SURGERY)

## 2025-05-29 PROCEDURE — 80047 BASIC METABLC PNL IONIZED CA: CPT

## 2025-05-29 PROCEDURE — 85347 COAGULATION TIME ACTIVATED: CPT

## 2025-05-29 PROCEDURE — 85027 COMPLETE CBC AUTOMATED: CPT

## 2025-05-29 PROCEDURE — 2700000000 HC OXYGEN THERAPY PER DAY

## 2025-05-29 PROCEDURE — 6360000002 HC RX W HCPCS: Performed by: THORACIC SURGERY (CARDIOTHORACIC VASCULAR SURGERY)

## 2025-05-29 PROCEDURE — 82805 BLOOD GASES W/O2 SATURATION: CPT

## 2025-05-29 PROCEDURE — 83605 ASSAY OF LACTIC ACID: CPT

## 2025-05-29 PROCEDURE — C1889 IMPLANT/INSERT DEVICE, NOC: HCPCS | Performed by: THORACIC SURGERY (CARDIOTHORACIC VASCULAR SURGERY)

## 2025-05-29 PROCEDURE — 2580000003 HC RX 258

## 2025-05-29 PROCEDURE — 85610 PROTHROMBIN TIME: CPT

## 2025-05-29 PROCEDURE — 2500000003 HC RX 250 WO HCPCS: Performed by: NURSE PRACTITIONER

## 2025-05-29 PROCEDURE — C1713 ANCHOR/SCREW BN/BN,TIS/BN: HCPCS | Performed by: THORACIC SURGERY (CARDIOTHORACIC VASCULAR SURGERY)

## 2025-05-29 PROCEDURE — 7100000000 HC PACU RECOVERY - FIRST 15 MIN

## 2025-05-29 PROCEDURE — 35600 OPEN HRV UXTR ART 1 SGM CAB: CPT | Performed by: THORACIC SURGERY (CARDIOTHORACIC VASCULAR SURGERY)

## 2025-05-29 PROCEDURE — 2580000003 HC RX 258: Performed by: NURSE PRACTITIONER

## 2025-05-29 PROCEDURE — 2500000003 HC RX 250 WO HCPCS: Performed by: THORACIC SURGERY (CARDIOTHORACIC VASCULAR SURGERY)

## 2025-05-29 PROCEDURE — 94669 MECHANICAL CHEST WALL OSCILL: CPT

## 2025-05-29 PROCEDURE — 3700000000 HC ANESTHESIA ATTENDED CARE: Performed by: THORACIC SURGERY (CARDIOTHORACIC VASCULAR SURGERY)

## 2025-05-29 PROCEDURE — 6370000000 HC RX 637 (ALT 250 FOR IP)

## 2025-05-29 PROCEDURE — 3600000018 HC SURGERY OHS ADDTL 15MIN: Performed by: THORACIC SURGERY (CARDIOTHORACIC VASCULAR SURGERY)

## 2025-05-29 PROCEDURE — 06BP4ZZ EXCISION OF RIGHT SAPHENOUS VEIN, PERCUTANEOUS ENDOSCOPIC APPROACH: ICD-10-PCS | Performed by: THORACIC SURGERY (CARDIOTHORACIC VASCULAR SURGERY)

## 2025-05-29 PROCEDURE — 71045 X-RAY EXAM CHEST 1 VIEW: CPT

## 2025-05-29 PROCEDURE — 99221 1ST HOSP IP/OBS SF/LOW 40: CPT | Performed by: INTERNAL MEDICINE

## 2025-05-29 PROCEDURE — 2580000003 HC RX 258: Performed by: NURSE ANESTHETIST, CERTIFIED REGISTERED

## 2025-05-29 PROCEDURE — 82330 ASSAY OF CALCIUM: CPT

## 2025-05-29 PROCEDURE — 3600000008 HC SURGERY OHS BASE: Performed by: THORACIC SURGERY (CARDIOTHORACIC VASCULAR SURGERY)

## 2025-05-29 PROCEDURE — 2720000010 HC SURG SUPPLY STERILE: Performed by: THORACIC SURGERY (CARDIOTHORACIC VASCULAR SURGERY)

## 2025-05-29 PROCEDURE — 6360000002 HC RX W HCPCS: Performed by: NURSE PRACTITIONER

## 2025-05-29 PROCEDURE — 85014 HEMATOCRIT: CPT

## 2025-05-29 PROCEDURE — 2000000000 HC ICU R&B

## 2025-05-29 PROCEDURE — 3700000001 HC ADD 15 MINUTES (ANESTHESIA): Performed by: THORACIC SURGERY (CARDIOTHORACIC VASCULAR SURGERY)

## 2025-05-29 PROCEDURE — 35600 OPEN HRV UXTR ART 1 SGM CAB: CPT | Performed by: STUDENT IN AN ORGANIZED HEALTH CARE EDUCATION/TRAINING PROGRAM

## 2025-05-29 PROCEDURE — 6370000000 HC RX 637 (ALT 250 FOR IP): Performed by: ANESTHESIOLOGY

## 2025-05-29 PROCEDURE — 6370000000 HC RX 637 (ALT 250 FOR IP): Performed by: NURSE ANESTHETIST, CERTIFIED REGISTERED

## 2025-05-29 PROCEDURE — 2709999900 HC NON-CHARGEABLE SUPPLY: Performed by: THORACIC SURGERY (CARDIOTHORACIC VASCULAR SURGERY)

## 2025-05-29 PROCEDURE — 03BC0ZZ EXCISION OF LEFT RADIAL ARTERY, OPEN APPROACH: ICD-10-PCS | Performed by: THORACIC SURGERY (CARDIOTHORACIC VASCULAR SURGERY)

## 2025-05-29 PROCEDURE — 02100AW BYPASS CORONARY ARTERY, ONE ARTERY FROM AORTA WITH AUTOLOGOUS ARTERIAL TISSUE, OPEN APPROACH: ICD-10-PCS | Performed by: THORACIC SURGERY (CARDIOTHORACIC VASCULAR SURGERY)

## 2025-05-29 PROCEDURE — 82962 GLUCOSE BLOOD TEST: CPT

## 2025-05-29 PROCEDURE — 33268 EXCL LAA OPN OTH PX ANY METH: CPT | Performed by: THORACIC SURGERY (CARDIOTHORACIC VASCULAR SURGERY)

## 2025-05-29 PROCEDURE — 94002 VENT MGMT INPAT INIT DAY: CPT

## 2025-05-29 PROCEDURE — 33268 EXCL LAA OPN OTH PX ANY METH: CPT | Performed by: STUDENT IN AN ORGANIZED HEALTH CARE EDUCATION/TRAINING PROGRAM

## 2025-05-29 PROCEDURE — B24BZZ4 ULTRASONOGRAPHY OF HEART WITH AORTA, TRANSESOPHAGEAL: ICD-10-PCS | Performed by: ANESTHESIOLOGY

## 2025-05-29 PROCEDURE — 37799 UNLISTED PX VASCULAR SURGERY: CPT

## 2025-05-29 PROCEDURE — 85018 HEMOGLOBIN: CPT

## 2025-05-29 PROCEDURE — 85730 THROMBOPLASTIN TIME PARTIAL: CPT

## 2025-05-29 PROCEDURE — 5A1221Z PERFORMANCE OF CARDIAC OUTPUT, CONTINUOUS: ICD-10-PCS | Performed by: THORACIC SURGERY (CARDIOTHORACIC VASCULAR SURGERY)

## 2025-05-29 PROCEDURE — 2500000003 HC RX 250 WO HCPCS: Performed by: NURSE ANESTHETIST, CERTIFIED REGISTERED

## 2025-05-29 PROCEDURE — 80048 BASIC METABOLIC PNL TOTAL CA: CPT

## 2025-05-29 PROCEDURE — 6360000002 HC RX W HCPCS: Performed by: NURSE ANESTHETIST, CERTIFIED REGISTERED

## 2025-05-29 PROCEDURE — P9047 ALBUMIN (HUMAN), 25%, 50ML: HCPCS | Performed by: THORACIC SURGERY (CARDIOTHORACIC VASCULAR SURGERY)

## 2025-05-29 PROCEDURE — 33534 CABG ARTERIAL TWO: CPT | Performed by: STUDENT IN AN ORGANIZED HEALTH CARE EDUCATION/TRAINING PROGRAM

## 2025-05-29 PROCEDURE — 94664 DEMO&/EVAL PT USE INHALER: CPT

## 2025-05-29 DEVICE — STERNALPLATE, LADDER, NARROW: Type: IMPLANTABLE DEVICE | Site: STERNUM | Status: FUNCTIONAL

## 2025-05-29 DEVICE — LOCKING SCREW,AXS,SELF-DRILLING
Type: IMPLANTABLE DEVICE | Site: STERNUM | Status: FUNCTIONAL
Brand: AXS, SMARTLOCK

## 2025-05-29 DEVICE — DEVICE LAA EXCLUSION CLP L 35 MM NIT SPRING POLYESTER CVR: Type: IMPLANTABLE DEVICE | Site: HEART | Status: FUNCTIONAL

## 2025-05-29 DEVICE — STERNALPLATE, HEX, NARROW: Type: IMPLANTABLE DEVICE | Site: STERNUM | Status: FUNCTIONAL

## 2025-05-29 RX ORDER — HEPARIN SODIUM 10000 [USP'U]/ML
INJECTION, SOLUTION INTRAVENOUS; SUBCUTANEOUS
Status: DISCONTINUED | OUTPATIENT
Start: 2025-05-29 | End: 2025-05-29 | Stop reason: SDUPTHER

## 2025-05-29 RX ORDER — ONDANSETRON 2 MG/ML
INJECTION INTRAMUSCULAR; INTRAVENOUS
Status: DISCONTINUED | OUTPATIENT
Start: 2025-05-29 | End: 2025-05-29 | Stop reason: SDUPTHER

## 2025-05-29 RX ORDER — DEXTROSE MONOHYDRATE 100 MG/ML
INJECTION, SOLUTION INTRAVENOUS CONTINUOUS PRN
Status: DISCONTINUED | OUTPATIENT
Start: 2025-05-29 | End: 2025-06-01

## 2025-05-29 RX ORDER — SODIUM CHLORIDE 9 MG/ML
INJECTION, SOLUTION INTRAVENOUS CONTINUOUS
Status: DISCONTINUED | OUTPATIENT
Start: 2025-05-29 | End: 2025-06-01

## 2025-05-29 RX ORDER — ATORVASTATIN CALCIUM 40 MG/1
40 TABLET, FILM COATED ORAL NIGHTLY
Status: DISCONTINUED | OUTPATIENT
Start: 2025-05-29 | End: 2025-06-05 | Stop reason: HOSPADM

## 2025-05-29 RX ORDER — NEOSTIGMINE METHYLSULFATE 1 MG/ML
INJECTION, SOLUTION INTRAVENOUS
Status: DISCONTINUED | OUTPATIENT
Start: 2025-05-29 | End: 2025-05-29 | Stop reason: SDUPTHER

## 2025-05-29 RX ORDER — PANTOPRAZOLE SODIUM 40 MG/1
40 TABLET, DELAYED RELEASE ORAL
Status: DISCONTINUED | OUTPATIENT
Start: 2025-05-30 | End: 2025-06-05 | Stop reason: HOSPADM

## 2025-05-29 RX ORDER — ASPIRIN 81 MG/1
81 TABLET ORAL DAILY
Status: DISCONTINUED | OUTPATIENT
Start: 2025-05-30 | End: 2025-06-01

## 2025-05-29 RX ORDER — PROTAMINE SULFATE 10 MG/ML
INJECTION, SOLUTION INTRAVENOUS
Status: DISCONTINUED | OUTPATIENT
Start: 2025-05-29 | End: 2025-05-29 | Stop reason: SDUPTHER

## 2025-05-29 RX ORDER — INDOMETHACIN 25 MG/1
100 CAPSULE ORAL ONCE
Status: COMPLETED | OUTPATIENT
Start: 2025-05-29 | End: 2025-05-29

## 2025-05-29 RX ORDER — MIDAZOLAM HYDROCHLORIDE 1 MG/ML
INJECTION, SOLUTION INTRAMUSCULAR; INTRAVENOUS
Status: DISCONTINUED | OUTPATIENT
Start: 2025-05-29 | End: 2025-05-29 | Stop reason: SDUPTHER

## 2025-05-29 RX ORDER — AMINOCAPROIC ACID 250 MG/ML
INJECTION, SOLUTION INTRAVENOUS
Status: DISCONTINUED | OUTPATIENT
Start: 2025-05-29 | End: 2025-05-29 | Stop reason: SDUPTHER

## 2025-05-29 RX ORDER — MAGNESIUM SULFATE IN WATER 40 MG/ML
2000 INJECTION, SOLUTION INTRAVENOUS PRN
Status: DISCONTINUED | OUTPATIENT
Start: 2025-05-29 | End: 2025-06-01

## 2025-05-29 RX ORDER — SODIUM CHLORIDE 0.9 % (FLUSH) 0.9 %
5-40 SYRINGE (ML) INJECTION PRN
Status: DISCONTINUED | OUTPATIENT
Start: 2025-05-29 | End: 2025-05-29 | Stop reason: HOSPADM

## 2025-05-29 RX ORDER — SENNA AND DOCUSATE SODIUM 50; 8.6 MG/1; MG/1
1 TABLET, FILM COATED ORAL 2 TIMES DAILY
Status: DISCONTINUED | OUTPATIENT
Start: 2025-05-29 | End: 2025-06-01

## 2025-05-29 RX ORDER — ALBUMIN HUMAN 50 G/1000ML
25 SOLUTION INTRAVENOUS PRN
Status: DISCONTINUED | OUTPATIENT
Start: 2025-05-29 | End: 2025-06-01

## 2025-05-29 RX ORDER — SODIUM CHLORIDE 9 MG/ML
INJECTION, SOLUTION INTRAVENOUS CONTINUOUS
Status: DISCONTINUED | OUTPATIENT
Start: 2025-05-29 | End: 2025-05-29 | Stop reason: HOSPADM

## 2025-05-29 RX ORDER — NOREPINEPHRINE BITARTRATE 0.06 MG/ML
1-100 INJECTION, SOLUTION INTRAVENOUS CONTINUOUS PRN
Status: DISCONTINUED | OUTPATIENT
Start: 2025-05-29 | End: 2025-06-01

## 2025-05-29 RX ORDER — ACETAMINOPHEN 500 MG
1000 TABLET ORAL EVERY 6 HOURS SCHEDULED
Status: DISPENSED | OUTPATIENT
Start: 2025-05-29 | End: 2025-05-31

## 2025-05-29 RX ORDER — ETOMIDATE 2 MG/ML
INJECTION INTRAVENOUS
Status: DISCONTINUED | OUTPATIENT
Start: 2025-05-29 | End: 2025-05-29 | Stop reason: SDUPTHER

## 2025-05-29 RX ORDER — PROPOFOL 10 MG/ML
5-50 INJECTION, EMULSION INTRAVENOUS CONTINUOUS PRN
Status: DISCONTINUED | OUTPATIENT
Start: 2025-05-29 | End: 2025-06-01

## 2025-05-29 RX ORDER — OXYCODONE HYDROCHLORIDE 5 MG/1
5 TABLET ORAL EVERY 4 HOURS PRN
Status: DISCONTINUED | OUTPATIENT
Start: 2025-06-03 | End: 2025-06-05 | Stop reason: HOSPADM

## 2025-05-29 RX ORDER — CELECOXIB 100 MG/1
200 CAPSULE ORAL ONCE
Status: COMPLETED | OUTPATIENT
Start: 2025-05-29 | End: 2025-05-29

## 2025-05-29 RX ORDER — OXYCODONE HYDROCHLORIDE 10 MG/1
10 TABLET ORAL EVERY 4 HOURS PRN
Status: DISPENSED | OUTPATIENT
Start: 2025-05-29 | End: 2025-06-03

## 2025-05-29 RX ORDER — SODIUM CHLORIDE 0.9 % (FLUSH) 0.9 %
5-40 SYRINGE (ML) INJECTION EVERY 12 HOURS SCHEDULED
Status: DISCONTINUED | OUTPATIENT
Start: 2025-05-29 | End: 2025-06-01

## 2025-05-29 RX ORDER — LANOLIN ALCOHOL/MO/W.PET/CERES
400 CREAM (GRAM) TOPICAL 2 TIMES DAILY
Status: DISCONTINUED | OUTPATIENT
Start: 2025-05-30 | End: 2025-06-05 | Stop reason: HOSPADM

## 2025-05-29 RX ORDER — POLYETHYLENE GLYCOL 3350 17 G/17G
17 POWDER, FOR SOLUTION ORAL 2 TIMES DAILY
Status: DISCONTINUED | OUTPATIENT
Start: 2025-05-30 | End: 2025-06-01

## 2025-05-29 RX ORDER — GLYCOPYRROLATE 0.2 MG/ML
INJECTION INTRAMUSCULAR; INTRAVENOUS
Status: DISCONTINUED | OUTPATIENT
Start: 2025-05-29 | End: 2025-05-29 | Stop reason: SDUPTHER

## 2025-05-29 RX ORDER — ONDANSETRON 2 MG/ML
4 INJECTION INTRAMUSCULAR; INTRAVENOUS EVERY 6 HOURS PRN
Status: DISCONTINUED | OUTPATIENT
Start: 2025-05-29 | End: 2025-06-01

## 2025-05-29 RX ORDER — IBUPROFEN 400 MG/1
400 TABLET, FILM COATED ORAL EVERY 6 HOURS PRN
Status: DISCONTINUED | OUTPATIENT
Start: 2025-06-01 | End: 2025-06-05 | Stop reason: HOSPADM

## 2025-05-29 RX ORDER — POTASSIUM CHLORIDE 29.8 MG/ML
20 INJECTION INTRAVENOUS PRN
Status: DISCONTINUED | OUTPATIENT
Start: 2025-05-29 | End: 2025-06-01

## 2025-05-29 RX ORDER — SODIUM CHLORIDE 9 MG/ML
INJECTION, SOLUTION INTRAVENOUS PRN
Status: DISCONTINUED | OUTPATIENT
Start: 2025-05-29 | End: 2025-06-01

## 2025-05-29 RX ORDER — BISACODYL 10 MG
10 SUPPOSITORY, RECTAL RECTAL DAILY PRN
Status: DISCONTINUED | OUTPATIENT
Start: 2025-05-30 | End: 2025-06-01

## 2025-05-29 RX ORDER — ALBUMIN HUMAN 50 G/1000ML
SOLUTION INTRAVENOUS
Status: COMPLETED
Start: 2025-05-29 | End: 2025-05-29

## 2025-05-29 RX ORDER — LIDOCAINE HYDROCHLORIDE 20 MG/ML
INJECTION, SOLUTION INTRAVENOUS
Status: DISCONTINUED | OUTPATIENT
Start: 2025-05-29 | End: 2025-05-29

## 2025-05-29 RX ORDER — GLUCAGON 1 MG/ML
1 KIT INJECTION PRN
Status: DISCONTINUED | OUTPATIENT
Start: 2025-05-29 | End: 2025-06-01

## 2025-05-29 RX ORDER — MUPIROCIN 2 %
OINTMENT (GRAM) TOPICAL 2 TIMES DAILY
Status: COMPLETED | OUTPATIENT
Start: 2025-05-29 | End: 2025-06-02

## 2025-05-29 RX ORDER — AMIODARONE HYDROCHLORIDE 200 MG/1
400 TABLET ORAL PRN
Status: DISCONTINUED | OUTPATIENT
Start: 2025-05-29 | End: 2025-06-01

## 2025-05-29 RX ORDER — SODIUM CHLORIDE 9 MG/ML
INJECTION, SOLUTION INTRAVENOUS
Status: DISCONTINUED | OUTPATIENT
Start: 2025-05-29 | End: 2025-05-29 | Stop reason: SDUPTHER

## 2025-05-29 RX ORDER — CHLORHEXIDINE GLUCONATE ORAL RINSE 1.2 MG/ML
15 SOLUTION DENTAL 2 TIMES DAILY
Status: DISCONTINUED | OUTPATIENT
Start: 2025-05-29 | End: 2025-05-31

## 2025-05-29 RX ORDER — ONDANSETRON 4 MG/1
4 TABLET, ORALLY DISINTEGRATING ORAL EVERY 8 HOURS PRN
Status: DISCONTINUED | OUTPATIENT
Start: 2025-05-29 | End: 2025-06-01

## 2025-05-29 RX ORDER — VASOPRESSIN 20 [USP'U]/ML
INJECTION, SOLUTION INTRAVENOUS
Status: DISPENSED
Start: 2025-05-29 | End: 2025-05-30

## 2025-05-29 RX ORDER — LIDOCAINE 4 G/G
1 PATCH TOPICAL DAILY
Status: DISCONTINUED | OUTPATIENT
Start: 2025-05-29 | End: 2025-06-05 | Stop reason: HOSPADM

## 2025-05-29 RX ORDER — ALBUMIN (HUMAN) 12.5 G/50ML
50 SOLUTION INTRAVENOUS ONCE
Status: COMPLETED | OUTPATIENT
Start: 2025-05-29 | End: 2025-05-29

## 2025-05-29 RX ORDER — INSULIN LISPRO 100 [IU]/ML
0-4 INJECTION, SOLUTION INTRAVENOUS; SUBCUTANEOUS EVERY 4 HOURS
Status: DISCONTINUED | OUTPATIENT
Start: 2025-05-29 | End: 2025-06-01 | Stop reason: ALTCHOICE

## 2025-05-29 RX ORDER — LACTULOSE 10 G/15ML
20 SOLUTION ORAL DAILY
Status: DISCONTINUED | OUTPATIENT
Start: 2025-05-31 | End: 2025-06-05 | Stop reason: HOSPADM

## 2025-05-29 RX ORDER — IPRATROPIUM BROMIDE AND ALBUTEROL SULFATE 2.5; .5 MG/3ML; MG/3ML
1 SOLUTION RESPIRATORY (INHALATION)
Status: DISCONTINUED | OUTPATIENT
Start: 2025-05-29 | End: 2025-06-05 | Stop reason: HOSPADM

## 2025-05-29 RX ORDER — VECURONIUM BROMIDE 1 MG/ML
INJECTION, POWDER, LYOPHILIZED, FOR SOLUTION INTRAVENOUS
Status: DISCONTINUED | OUTPATIENT
Start: 2025-05-29 | End: 2025-05-29 | Stop reason: SDUPTHER

## 2025-05-29 RX ORDER — EPHEDRINE SULFATE/0.9% NACL/PF 25 MG/5 ML
SYRINGE (ML) INTRAVENOUS
Status: DISCONTINUED | OUTPATIENT
Start: 2025-05-29 | End: 2025-05-29 | Stop reason: SDUPTHER

## 2025-05-29 RX ORDER — SODIUM CHLORIDE 0.9 % (FLUSH) 0.9 %
5-40 SYRINGE (ML) INJECTION PRN
Status: DISCONTINUED | OUTPATIENT
Start: 2025-05-29 | End: 2025-06-01

## 2025-05-29 RX ORDER — OXYCODONE HYDROCHLORIDE 5 MG/1
5 TABLET ORAL EVERY 4 HOURS PRN
Status: DISPENSED | OUTPATIENT
Start: 2025-05-29 | End: 2025-06-03

## 2025-05-29 RX ORDER — SODIUM CHLORIDE, SODIUM LACTATE, POTASSIUM CHLORIDE, CALCIUM CHLORIDE 600; 310; 30; 20 MG/100ML; MG/100ML; MG/100ML; MG/100ML
INJECTION, SOLUTION INTRAVENOUS
Status: DISCONTINUED | OUTPATIENT
Start: 2025-05-29 | End: 2025-05-29 | Stop reason: SDUPTHER

## 2025-05-29 RX ORDER — MEPERIDINE HYDROCHLORIDE 25 MG/ML
25 INJECTION INTRAMUSCULAR; INTRAVENOUS; SUBCUTANEOUS
Status: DISCONTINUED | OUTPATIENT
Start: 2025-05-29 | End: 2025-06-01

## 2025-05-29 RX ORDER — CALCIUM CARBONATE 500 MG/1
500 TABLET, CHEWABLE ORAL 3 TIMES DAILY PRN
Status: DISCONTINUED | OUTPATIENT
Start: 2025-05-30 | End: 2025-06-01

## 2025-05-29 RX ORDER — ASPIRIN 81 MG/1
81 TABLET, CHEWABLE ORAL ONCE
Status: COMPLETED | OUTPATIENT
Start: 2025-05-29 | End: 2025-05-29

## 2025-05-29 RX ORDER — SUFENTANIL CITRATE 50 UG/ML
INJECTION EPIDURAL; INTRAVENOUS
Status: DISCONTINUED | OUTPATIENT
Start: 2025-05-29 | End: 2025-05-29 | Stop reason: SDUPTHER

## 2025-05-29 RX ORDER — ROPIVACAINE HYDROCHLORIDE 5 MG/ML
INJECTION, SOLUTION EPIDURAL; INFILTRATION; PERINEURAL PRN
Status: DISCONTINUED | OUTPATIENT
Start: 2025-05-29 | End: 2025-05-29 | Stop reason: HOSPADM

## 2025-05-29 RX ORDER — PROPOFOL 10 MG/ML
INJECTION, EMULSION INTRAVENOUS
Status: COMPLETED
Start: 2025-05-29 | End: 2025-05-29

## 2025-05-29 RX ORDER — SODIUM CHLORIDE 0.9 % (FLUSH) 0.9 %
5-40 SYRINGE (ML) INJECTION EVERY 12 HOURS SCHEDULED
Status: DISCONTINUED | OUTPATIENT
Start: 2025-05-29 | End: 2025-05-29 | Stop reason: HOSPADM

## 2025-05-29 RX ORDER — CEFAZOLIN SODIUM 1 G/3ML
INJECTION, POWDER, FOR SOLUTION INTRAMUSCULAR; INTRAVENOUS
Status: DISCONTINUED | OUTPATIENT
Start: 2025-05-29 | End: 2025-05-29 | Stop reason: SDUPTHER

## 2025-05-29 RX ORDER — PROPOFOL 10 MG/ML
INJECTION, EMULSION INTRAVENOUS
Status: DISCONTINUED | OUTPATIENT
Start: 2025-05-29 | End: 2025-05-29 | Stop reason: SDUPTHER

## 2025-05-29 RX ORDER — INDOMETHACIN 25 MG/1
50 CAPSULE ORAL ONCE
Status: COMPLETED | OUTPATIENT
Start: 2025-05-29 | End: 2025-05-29

## 2025-05-29 RX ORDER — ACETAMINOPHEN 500 MG
1000 TABLET ORAL ONCE
Status: COMPLETED | OUTPATIENT
Start: 2025-05-29 | End: 2025-05-29

## 2025-05-29 RX ORDER — OXYCODONE HCL 10 MG/1
10 TABLET, FILM COATED, EXTENDED RELEASE ORAL ONCE
Status: COMPLETED | OUTPATIENT
Start: 2025-05-29 | End: 2025-05-29

## 2025-05-29 RX ORDER — KETOROLAC TROMETHAMINE 30 MG/ML
15 INJECTION, SOLUTION INTRAMUSCULAR; INTRAVENOUS EVERY 6 HOURS
Status: COMPLETED | OUTPATIENT
Start: 2025-05-29 | End: 2025-05-30

## 2025-05-29 RX ORDER — BISACODYL 5 MG/1
5 TABLET, DELAYED RELEASE ORAL DAILY
Status: DISCONTINUED | OUTPATIENT
Start: 2025-05-30 | End: 2025-06-01

## 2025-05-29 RX ORDER — DEXAMETHASONE SODIUM PHOSPHATE 10 MG/ML
INJECTION, SOLUTION INTRA-ARTICULAR; INTRALESIONAL; INTRAMUSCULAR; INTRAVENOUS; SOFT TISSUE
Status: DISCONTINUED | OUTPATIENT
Start: 2025-05-29 | End: 2025-05-29 | Stop reason: SDUPTHER

## 2025-05-29 RX ORDER — BISACODYL 10 MG
10 SUPPOSITORY, RECTAL RECTAL DAILY
Status: DISCONTINUED | OUTPATIENT
Start: 2025-06-01 | End: 2025-06-01

## 2025-05-29 RX ORDER — AMIODARONE HYDROCHLORIDE 200 MG/1
400 TABLET ORAL 2 TIMES DAILY
Status: DISCONTINUED | OUTPATIENT
Start: 2025-05-30 | End: 2025-06-05 | Stop reason: HOSPADM

## 2025-05-29 RX ORDER — 0.9 % SODIUM CHLORIDE 0.9 %
250 INTRAVENOUS SOLUTION INTRAVENOUS CONTINUOUS PRN
Status: DISCONTINUED | OUTPATIENT
Start: 2025-05-29 | End: 2025-06-01

## 2025-05-29 RX ORDER — IBUPROFEN 400 MG/1
400 TABLET, FILM COATED ORAL EVERY 8 HOURS
Status: COMPLETED | OUTPATIENT
Start: 2025-05-31 | End: 2025-06-01

## 2025-05-29 RX ORDER — ACETAMINOPHEN 325 MG/1
650 TABLET ORAL EVERY 4 HOURS PRN
Status: DISCONTINUED | OUTPATIENT
Start: 2025-06-01 | End: 2025-06-05 | Stop reason: HOSPADM

## 2025-05-29 RX ORDER — DEXAMETHASONE SODIUM PHOSPHATE 10 MG/ML
INJECTION, SOLUTION INTRAMUSCULAR; INTRAVENOUS PRN
Status: DISCONTINUED | OUTPATIENT
Start: 2025-05-29 | End: 2025-05-29 | Stop reason: HOSPADM

## 2025-05-29 RX ORDER — SODIUM CHLORIDE 9 MG/ML
INJECTION, SOLUTION INTRAVENOUS PRN
Status: DISCONTINUED | OUTPATIENT
Start: 2025-05-29 | End: 2025-05-29 | Stop reason: HOSPADM

## 2025-05-29 RX ORDER — ACETAMINOPHEN 650 MG/1
650 SUPPOSITORY RECTAL EVERY 4 HOURS PRN
Status: DISCONTINUED | OUTPATIENT
Start: 2025-05-29 | End: 2025-06-05 | Stop reason: HOSPADM

## 2025-05-29 RX ORDER — INSULIN GLARGINE 100 [IU]/ML
0.15 INJECTION, SOLUTION SUBCUTANEOUS NIGHTLY
Status: DISCONTINUED | OUTPATIENT
Start: 2025-05-30 | End: 2025-06-05 | Stop reason: HOSPADM

## 2025-05-29 RX ORDER — TAMSULOSIN HYDROCHLORIDE 0.4 MG/1
0.4 CAPSULE ORAL DAILY
Status: DISCONTINUED | OUTPATIENT
Start: 2025-05-30 | End: 2025-06-02

## 2025-05-29 RX ADMIN — SODIUM CHLORIDE 16.1 UNITS/HR: 9 INJECTION, SOLUTION INTRAVENOUS at 23:13

## 2025-05-29 RX ADMIN — ALBUMIN (HUMAN) 25 G: 12.5 INJECTION, SOLUTION INTRAVENOUS at 11:07

## 2025-05-29 RX ADMIN — CHLORHEXIDINE GLUCONATE, 0.12% ORAL RINSE 15 ML: 1.2 SOLUTION DENTAL at 20:24

## 2025-05-29 RX ADMIN — MUPIROCIN: 20 OINTMENT TOPICAL at 11:49

## 2025-05-29 RX ADMIN — SODIUM BICARBONATE 100 MEQ: 84 INJECTION INTRAVENOUS at 13:37

## 2025-05-29 RX ADMIN — VASOPRESSIN 0.02 UNITS/MIN: 20 INJECTION INTRAVENOUS at 16:10

## 2025-05-29 RX ADMIN — SODIUM BICARBONATE 50 MEQ: 84 INJECTION INTRAVENOUS at 18:06

## 2025-05-29 RX ADMIN — POTASSIUM CHLORIDE 20 MEQ: 29.8 INJECTION, SOLUTION INTRAVENOUS at 18:40

## 2025-05-29 RX ADMIN — MUPIROCIN: 20 OINTMENT TOPICAL at 20:24

## 2025-05-29 RX ADMIN — AMINOCAPROIC ACID 5000 MG: 250 INJECTION, SOLUTION INTRAVENOUS at 06:58

## 2025-05-29 RX ADMIN — SODIUM BICARBONATE: 84 INJECTION, SOLUTION INTRAVENOUS at 16:47

## 2025-05-29 RX ADMIN — SODIUM CHLORIDE: 9 INJECTION, SOLUTION INTRAVENOUS at 06:33

## 2025-05-29 RX ADMIN — PANTOPRAZOLE SODIUM 40 MG: 40 INJECTION, POWDER, FOR SOLUTION INTRAVENOUS at 11:43

## 2025-05-29 RX ADMIN — EPHEDRINE SULFATE 10 MG: 5 INJECTION INTRAVENOUS at 10:01

## 2025-05-29 RX ADMIN — HYDROMORPHONE HYDROCHLORIDE 0.25 MG: 1 INJECTION, SOLUTION INTRAMUSCULAR; INTRAVENOUS; SUBCUTANEOUS at 20:14

## 2025-05-29 RX ADMIN — SUFENTANIL CITRATE 20 MCG: 50 INJECTION EPIDURAL; INTRAVENOUS at 07:55

## 2025-05-29 RX ADMIN — POTASSIUM CHLORIDE 20 MEQ: 29.8 INJECTION, SOLUTION INTRAVENOUS at 11:53

## 2025-05-29 RX ADMIN — ACETAMINOPHEN 1000 MG: 500 TABLET ORAL at 11:43

## 2025-05-29 RX ADMIN — ALBUMIN (HUMAN) 12.5 G: 12.5 INJECTION, SOLUTION INTRAVENOUS at 14:27

## 2025-05-29 RX ADMIN — SUFENTANIL CITRATE 0.25 MCG/KG/HR: 50 INJECTION EPIDURAL; INTRAVENOUS at 07:13

## 2025-05-29 RX ADMIN — HEPARIN SODIUM 40000 UNITS: 10000 INJECTION, SOLUTION INTRAVENOUS; SUBCUTANEOUS at 08:29

## 2025-05-29 RX ADMIN — GLYCOPYRROLATE 0.6 MG: 1 INJECTION INTRAMUSCULAR; INTRAVENOUS at 10:34

## 2025-05-29 RX ADMIN — SODIUM CHLORIDE, POTASSIUM CHLORIDE, SODIUM LACTATE AND CALCIUM CHLORIDE: 600; 310; 30; 20 INJECTION, SOLUTION INTRAVENOUS at 07:12

## 2025-05-29 RX ADMIN — SODIUM BICARBONATE 100 MEQ: 84 INJECTION INTRAVENOUS at 11:34

## 2025-05-29 RX ADMIN — SENNOSIDES AND DOCUSATE SODIUM 1 TABLET: 50; 8.6 TABLET ORAL at 20:24

## 2025-05-29 RX ADMIN — SUFENTANIL CITRATE 20 MCG: 50 INJECTION EPIDURAL; INTRAVENOUS at 07:06

## 2025-05-29 RX ADMIN — VECURONIUM BROMIDE 20 MG: 10 INJECTION, POWDER, LYOPHILIZED, FOR SOLUTION INTRAVENOUS at 07:06

## 2025-05-29 RX ADMIN — ETOMIDATE 10 MG: 2 INJECTION, SOLUTION INTRAVENOUS at 07:06

## 2025-05-29 RX ADMIN — KETOROLAC TROMETHAMINE 15 MG: 30 INJECTION, SOLUTION INTRAMUSCULAR at 11:43

## 2025-05-29 RX ADMIN — POTASSIUM CHLORIDE 20 MEQ: 29.8 INJECTION, SOLUTION INTRAVENOUS at 14:43

## 2025-05-29 RX ADMIN — CHLORHEXIDINE GLUCONATE, 0.12% ORAL RINSE 15 ML: 1.2 SOLUTION DENTAL at 11:50

## 2025-05-29 RX ADMIN — SODIUM BICARBONATE 50 MEQ: 84 INJECTION INTRAVENOUS at 20:55

## 2025-05-29 RX ADMIN — ONDANSETRON HYDROCHLORIDE 4 MG: 2 SOLUTION INTRAMUSCULAR; INTRAVENOUS at 10:22

## 2025-05-29 RX ADMIN — CELECOXIB 200 MG: 100 CAPSULE ORAL at 05:44

## 2025-05-29 RX ADMIN — CEFAZOLIN 2 G: 1 INJECTION, POWDER, FOR SOLUTION INTRAMUSCULAR; INTRAVENOUS at 10:03

## 2025-05-29 RX ADMIN — SODIUM CHLORIDE, PRESERVATIVE FREE 10 ML: 5 INJECTION INTRAVENOUS at 20:25

## 2025-05-29 RX ADMIN — ALBUMIN (HUMAN) 50 G: 0.25 INJECTION, SOLUTION INTRAVENOUS at 21:03

## 2025-05-29 RX ADMIN — IPRATROPIUM BROMIDE AND ALBUTEROL SULFATE 1 DOSE: 2.5; .5 SOLUTION RESPIRATORY (INHALATION) at 11:16

## 2025-05-29 RX ADMIN — INSULIN HUMAN 5 UNITS: 100 INJECTION, SOLUTION PARENTERAL at 09:05

## 2025-05-29 RX ADMIN — EPINEPHRINE 4 MCG/MIN: 1 INJECTION, SOLUTION, CONCENTRATE INTRAVENOUS at 11:58

## 2025-05-29 RX ADMIN — IPRATROPIUM BROMIDE AND ALBUTEROL SULFATE 1 DOSE: 2.5; .5 SOLUTION RESPIRATORY (INHALATION) at 21:09

## 2025-05-29 RX ADMIN — SODIUM CHLORIDE: 0.9 INJECTION, SOLUTION INTRAVENOUS at 05:47

## 2025-05-29 RX ADMIN — DEXAMETHASONE SODIUM PHOSPHATE 10 MG: 10 INJECTION INTRAMUSCULAR; INTRAVENOUS at 07:03

## 2025-05-29 RX ADMIN — IPRATROPIUM BROMIDE AND ALBUTEROL SULFATE 1 DOSE: 2.5; .5 SOLUTION RESPIRATORY (INHALATION) at 16:59

## 2025-05-29 RX ADMIN — ATORVASTATIN CALCIUM 40 MG: 40 TABLET, FILM COATED ORAL at 20:24

## 2025-05-29 RX ADMIN — EPHEDRINE SULFATE 10 MG: 5 INJECTION INTRAVENOUS at 07:22

## 2025-05-29 RX ADMIN — SODIUM BICARBONATE 100 MEQ: 84 INJECTION INTRAVENOUS at 16:32

## 2025-05-29 RX ADMIN — CEFAZOLIN 2 G: 1 INJECTION, POWDER, FOR SOLUTION INTRAMUSCULAR; INTRAVENOUS at 07:08

## 2025-05-29 RX ADMIN — ACETAMINOPHEN 1000 MG: 500 TABLET ORAL at 05:44

## 2025-05-29 RX ADMIN — Medication 3 MG: at 10:34

## 2025-05-29 RX ADMIN — SODIUM CHLORIDE: 0.9 INJECTION, SOLUTION INTRAVENOUS at 11:01

## 2025-05-29 RX ADMIN — MIDAZOLAM 2 MG: 1 INJECTION INTRAMUSCULAR; INTRAVENOUS at 06:45

## 2025-05-29 RX ADMIN — KETOROLAC TROMETHAMINE 15 MG: 30 INJECTION, SOLUTION INTRAMUSCULAR at 17:30

## 2025-05-29 RX ADMIN — WATER 2000 MG: 1 INJECTION INTRAMUSCULAR; INTRAVENOUS; SUBCUTANEOUS at 18:11

## 2025-05-29 RX ADMIN — SODIUM CHLORIDE 5 UNITS/HR: 9 INJECTION, SOLUTION INTRAVENOUS at 14:36

## 2025-05-29 RX ADMIN — OXYCODONE HYDROCHLORIDE 10 MG: 10 TABLET, FILM COATED, EXTENDED RELEASE ORAL at 05:44

## 2025-05-29 RX ADMIN — MIDAZOLAM 2 MG: 1 INJECTION INTRAMUSCULAR; INTRAVENOUS at 06:33

## 2025-05-29 RX ADMIN — EPINEPHRINE 2 MCG/MIN: 1 INJECTION PARENTERAL at 09:36

## 2025-05-29 RX ADMIN — PROPOFOL 25 MCG/KG/MIN: 10 INJECTION, EMULSION INTRAVENOUS at 10:38

## 2025-05-29 RX ADMIN — SENNOSIDES AND DOCUSATE SODIUM 1 TABLET: 50; 8.6 TABLET ORAL at 11:49

## 2025-05-29 RX ADMIN — PROTAMINE SULFATE 300 MG: 10 INJECTION, SOLUTION INTRAVENOUS at 09:50

## 2025-05-29 RX ADMIN — EPHEDRINE SULFATE 5 MG: 5 INJECTION INTRAVENOUS at 08:34

## 2025-05-29 RX ADMIN — SUFENTANIL CITRATE 20 MCG: 50 INJECTION EPIDURAL; INTRAVENOUS at 07:27

## 2025-05-29 RX ADMIN — SUFENTANIL CITRATE 17 MCG: 50 INJECTION EPIDURAL; INTRAVENOUS at 10:58

## 2025-05-29 RX ADMIN — ALBUMIN (HUMAN) 12.5 G: 12.5 INJECTION, SOLUTION INTRAVENOUS at 11:53

## 2025-05-29 RX ADMIN — POTASSIUM CHLORIDE 20 MEQ: 29.8 INJECTION, SOLUTION INTRAVENOUS at 22:08

## 2025-05-29 RX ADMIN — ASPIRIN 81 MG CHEWABLE TABLET 81 MG: 81 TABLET CHEWABLE at 11:43

## 2025-05-29 RX ADMIN — KETOROLAC TROMETHAMINE 15 MG: 30 INJECTION, SOLUTION INTRAMUSCULAR at 23:06

## 2025-05-29 RX ADMIN — AMINOCAPROIC ACID 1 G/HR: 250 INJECTION, SOLUTION INTRAVENOUS at 07:02

## 2025-05-29 ASSESSMENT — PULMONARY FUNCTION TESTS
PIF_VALUE: 17
PIF_VALUE: 24
PIF_VALUE: 25
PIF_VALUE: 22
PIF_VALUE: 24
PIF_VALUE: 25
PIF_VALUE: 16
PIF_VALUE: 24
PIF_VALUE: 16
PIF_VALUE: 16
PIF_VALUE: 25
PIF_VALUE: 24
PIF_VALUE: 24
PIF_VALUE: 26

## 2025-05-29 ASSESSMENT — PAIN SCALES - GENERAL
PAINLEVEL_OUTOF10: 4
PAINLEVEL_OUTOF10: 0
PAINLEVEL_OUTOF10: 2
PAINLEVEL_OUTOF10: 5
PAINLEVEL_OUTOF10: 0
PAINLEVEL_OUTOF10: 2

## 2025-05-29 ASSESSMENT — PAIN DESCRIPTION - LOCATION
LOCATION: INCISION
LOCATION: CHEST

## 2025-05-29 ASSESSMENT — PAIN DESCRIPTION - DESCRIPTORS
DESCRIPTORS: ACHING;SHARP
DESCRIPTORS: ACHING;SORE;DISCOMFORT

## 2025-05-29 ASSESSMENT — PAIN DESCRIPTION - ONSET
ONSET: AWAKENED FROM SLEEP
ONSET: ON-GOING

## 2025-05-29 ASSESSMENT — PAIN DESCRIPTION - PAIN TYPE
TYPE: ACUTE PAIN;SURGICAL PAIN
TYPE: ACUTE PAIN;SURGICAL PAIN

## 2025-05-29 ASSESSMENT — PAIN - FUNCTIONAL ASSESSMENT
PAIN_FUNCTIONAL_ASSESSMENT: ACTIVITIES ARE NOT PREVENTED
PAIN_FUNCTIONAL_ASSESSMENT: 0-10
PAIN_FUNCTIONAL_ASSESSMENT: ACTIVITIES ARE NOT PREVENTED

## 2025-05-29 ASSESSMENT — PAIN DESCRIPTION - ORIENTATION
ORIENTATION: MID
ORIENTATION: MID;LOWER

## 2025-05-29 ASSESSMENT — PAIN DESCRIPTION - FREQUENCY
FREQUENCY: CONTINUOUS
FREQUENCY: CONTINUOUS

## 2025-05-29 NOTE — PROGRESS NOTES
Patient arrived to the CVICU from OR with babin catheter intact and patent. Securing device applied. Babin bag is hanging below the level of the bladder, safety clip attached to the bed sheet, tamper seal is intact, drainage bag is not on the floor, lack of dependent loop in tubing, and the drainage bag is less than half full.

## 2025-05-29 NOTE — PROGRESS NOTES
Cuff leak preformed yes.      Patient was extubated to 5 liters/min via nasal cannula. Breath Sounds post extubation were clear. Stridor was not present post extubation. SPO2 was 98%.      Performed by  Miguelangel Wheeler RCP

## 2025-05-29 NOTE — ANESTHESIA PROCEDURE NOTES
Procedure Performed: ANDRESSA       Start Time:        End Time:      Anesthesia Information  Performed Personally  Anesthesiologist:  Kiana Herrera MD      Echocardiogram Comments:       No difficulty with probe insertion or manipulation        Preanesthesia Checklist:  Patient identified, IV assessed, risks and benefits discussed, monitors and equipment assessed, procedure being performed at surgeon's request and anesthesia consent obtained.    General Procedure Information  Diagnostic Indications for Echo:  assessment of ascending aorta, assessment of surgical repair, defect repair evaluation, hemodynamic monitoring and assessment of valve function  Physician Requesting Echo: Ernesto Westbrook MD  Location performed:  OR  Intubated  Bite block placed  Heart visualized  Probe Insertion:  Easy  Probe Type:  3D and mulitplane  Modalities:  3D, color flow mapping, pulse wave Doppler and continuous wave Doppler    Echocardiographic and Doppler Measurements    Ventricles    Ventricle  Cavity Size  Cavity          Dimension  Hypertrophy  Thrombus  Global FXN  EF    RV  normal    No  No  normal      LV  dilated    No  No  normal, mildly impaired (40-49%)       Ventricular Findings Comments:  Inferior wall akinesis  Distal anteroseptal to apical akinesis    Ventricular Regional Function:  1- Basal Anteroseptal:  normal  2- Basal Anterior:  normal  3- Basal Anterolateral:  normal  4- Basal Inferolateral:  hypokinetic  5- Basal Inferior:  akinetic  6- Basal Inferoseptal:  hypokinetic  7- Mid Anteroseptal:  normal  8- Mid Anterior:  normal  9- Mid Anterolateral:  normal  10- Mid Inferolateral:  akinetic  11- Mid Inferior:  akinetic  12- Mid Inferoseptal:  hypokinetic  13- Apical Anterior:  normal  14- Apical Lateral:  normal  15- Apical Inferior:  normal  16- Apical Septal:  akinetic  17- [place]:  akinetic        Valves     Valves  Annulus  Stenosis Measurements   Regurg  Leaflet   Morph  Leaflet   Motion Valve Comments    Aortic

## 2025-05-29 NOTE — CONSULTS
Inpatient Cardiology Progress note     PATIENT IS BEING FOLLOWED FOR: Cardiology follow-up s/p CABG 2025    Patrick Pickering is a 64 y.o. obese  male known to Dr. URIEL Flowers.    PMH: CAD s/p multiple PCI's s/p ischemic stress 2025 s/p LHC 2025(severe triple-vessel CAD including recurrent ISR of the LAD stents LVEDP 18 mmHg), ICMP (EF 40-45% on TTE 2025 with no VHD )T2DM insulin requiring, BMI 30.5 kg/m², HLD(on Lipitor/Repatha), HTN, tobacco abuse, and COPD    2025 elective CABG x 2(LIMA-LAD, Rad-OM)/Exploration RCA/ANGELA exclusion with 35mm AtriClip/Posterior pericardotomy/EVH RLE/Open LUE radial harvest       SUBJECTIVE: Sedated intubated unresponsive  OBJECTIVE: No apparent distress     ROS: Sedated intubated unresponsive      PHYSICAL EXAM:   BP (!) 153/79   Pulse 67   Temp 97.3 °F (36.3 °C) (Temporal)   Resp 20   Ht 1.778 m (5' 10\")   Wt 96.6 kg (213 lb)   SpO2 94%   BMI 30.56 kg/m²    B/P Range last 24 hours: Systolic (24hrs), Av , Min:153 , Max:153    Diastolic (24hrs), Av, Min:79, Max:79    CONST: Well developed, well nourished obese  male who appears stated age.  Sedated orally intubated in no apparent distress  HEENT:   Head- Normocephalic, atraumatic   Eyes- Conjunctivae pink, anicteric  Throat- Oral mucosa pink and moist.  Orally intubated on mechanical ventilator. OGT to suction.   Neck-  No stridor, trachea midline, no jugular venous distention. ELLA for carotid bruit. RIJ introducer  CHEST: Chest symmetrical and no grimacing to palpation. No accessory muscle use or intercostal retractions. MSI with TELLY dressing D&I.  Pacer wire secured to box. CT to suction.   RESPIRATORY:  Lung sounds -clear anteriorly, on mechanical ventilation  CARDIOVASCULAR:     Heart Ausculation- Regular rate and rhythm, no murmur heard  PV: No lower extremity edema. No varicosities. Pedal pulses palpable, no clubbing or cyanosis.  Bilateral toes cool to touch. R brachial  stenosis noted.  Ramus branch large bifurcating with moderate diffuse ISR of the proximal-mid stent with 80% ISR at the proximal end of the stent.  80% discrete mid ramus stenosis prior to its bifurcation. RCA  at the proximal segment.  Occluded stent was noted in the mid-segment.  The distal branches receives collaterals from the LAD and the LCx    Cardiology Labs: BMP:    Lab Results   Component Value Date/Time     05/29/2025 10:41 AM    K 3.9 05/29/2025 01:09 PM    K 3.7 10/28/2018 04:24 AM     05/29/2025 10:41 AM    CO2 19 05/29/2025 10:41 AM    BUN 15 05/29/2025 10:41 AM     CMP:    Lab Results   Component Value Date/Time     05/29/2025 10:41 AM    K 3.9 05/29/2025 01:09 PM    K 3.7 10/28/2018 04:24 AM     05/29/2025 10:41 AM    CO2 19 05/29/2025 10:41 AM    BUN 15 05/29/2025 10:41 AM     CBC:    Lab Results   Component Value Date/Time    WBC 27.7 05/29/2025 10:41 AM    RBC 4.85 05/29/2025 10:41 AM    HGB 11.9 05/29/2025 03:50 PM    HCT 37.4 05/29/2025 03:50 PM    MCV 90.7 05/29/2025 10:41 AM    RDW 13.2 05/29/2025 10:41 AM     05/29/2025 10:41 AM     PT/INR:  No results found for: \"PTINR\"  PT/INR Warfarin:  No components found for: \"PTPATWAR\", \"PTINRWAR\"  PTT:    Lab Results   Component Value Date/Time    APTT 32.3 05/29/2025 10:41 AM    APTT 26.5 05/16/2025 02:23 PM     PTT Heparin:  No components found for: \"APTTHEP\"  Magnesium:    Lab Results   Component Value Date/Time    MG 3.0 05/29/2025 10:41 AM     TSH:    Lab Results   Component Value Date/Time    TSH 2.44 05/22/2025 07:35 AM     TROPONIN:  No components found for: \"TROP\"  BNP:  No results found for: \"BNP\"  FASTING LIPID PANEL:    Lab Results   Component Value Date/Time    CHOL 241 05/05/2025 11:55 AM    HDL 43 05/05/2025 11:55 AM    TRIG 197 05/05/2025 11:55 AM     XR CHEST PORTABLE   Final Result   1. Focal area of collapse in the left upper lobe.   2. Bilateral chest tubes.   3. Support tubes are appropriate

## 2025-05-29 NOTE — PLAN OF CARE
Problem: Chronic Conditions and Co-morbidities  Goal: Patient's chronic conditions and co-morbidity symptoms are monitored and maintained or improved  Outcome: Progressing  Flowsheets (Taken 5/29/2025 1200)  Care Plan - Patient's Chronic Conditions and Co-Morbidity Symptoms are Monitored and Maintained or Improved:   Monitor and assess patient's chronic conditions and comorbid symptoms for stability, deterioration, or improvement   Collaborate with multidisciplinary team to address chronic and comorbid conditions and prevent exacerbation or deterioration   Update acute care plan with appropriate goals if chronic or comorbid symptoms are exacerbated and prevent overall improvement and discharge     Problem: Pain  Goal: Verbalizes/displays adequate comfort level or baseline comfort level  Outcome: Progressing  Flowsheets (Taken 5/29/2025 1200)  Verbalizes/displays adequate comfort level or baseline comfort level:   Encourage patient to monitor pain and request assistance   Assess pain using appropriate pain scale   Administer analgesics based on type and severity of pain and evaluate response     Problem: Respiratory - Adult  Goal: Achieves optimal ventilation and oxygenation  Outcome: Progressing  Flowsheets (Taken 5/29/2025 1200)  Achieves optimal ventilation and oxygenation:   Assess for changes in respiratory status   Position to facilitate oxygenation and minimize respiratory effort   Initiate smoking cessation protocol as indicated   Assess for changes in mentation and behavior     Problem: Metabolic/Fluid and Electrolytes - Adult  Goal: Electrolytes maintained within normal limits  Outcome: Progressing  Flowsheets (Taken 5/29/2025 1200)  Electrolytes maintained within normal limits:   Monitor labs and assess patient for signs and symptoms of electrolyte imbalances   Administer electrolyte replacement as ordered   Monitor response to electrolyte replacements, including repeat lab results as appropriate

## 2025-05-29 NOTE — BRIEF OP NOTE
Brief Postoperative Note        Patrick Pickering  YOB: 1960  [id number]    Pre-operative Diagnosis: CAD    Post-operative Diagnosis: Same    Operation: Sternotomy/CABG x 2 (LIMA-LAD, Rad-OM)/Exploration RCA/ANGELA exclusion with 35mm AtriClip/Posterior pericardotomy/EVH RLE/Open LUE radial harvest/Rigid internal fixation of the sternum with Rafael plates x 2    Anesthesia: General    Surgeon: Pietro    Assistant: Kirk  Physician Assistant: Buffy Gonzalez APRN - CNP; Maryam Mckeon PA    Anesthesia: General    Estimated Blood Loss (mL): 1000    Complications: None    Specimens:   * No specimens in log *    Implants:  Implant Name Type Inv. Item Serial No.  Lot No. LRB No. Used Action   DEVICE ANGELA EXCLUSION CLP L 35 MM NIT SPRING POLYESTER CVR - [id number]  DEVICE ANGELA EXCLUSION CLP L 35 MM NIT SPRING POLYESTER CVR  [employer]-WD [id number] N/A 1 Implanted   PLATE FIXATION STERNAL HEX 6 HOLES - [id number]  PLATE FIXATION STERNAL HEX 6 HOLES  RAFAEL ALEJANDRO-WD  N/A 1 Implanted   PLATE FIXATION STERNAL LADDER 10 HOLES - [id number]  PLATE FIXATION STERNAL LADDER 10 HOLES  RAFAEL ALEJANDRO-WD  N/A 1 Implanted   SCREW BNE SELF DRILLING 2.3X14 MM LCK - [id number]  SCREW BNE SELF DRILLING 2.3X14 MM LCK  RAFAEL CRANIOMAXILLOFACIAL-WD  N/A 10 Implanted   SCREW LOCKING SD 2.3X18MM 5P - [id number]  SCREW LOCKING SD 2.3X18MM 5P  RAFAEL CRANIOMAXILLOFACIAL-WD  N/A 2 Implanted   SCREW LOCKING SD 2.3X20MM 5P - [id number]  SCREW LOCKING SD 2.3X20MM 5P  RAFAEL CRANIOMAXILLOFACIAL-WD  N/A 1 Implanted         Drains:   Chest Tube Mediastinal 1 (Active)   $ Chest tube insertion $ Yes 05/29/25 0955   Chest Tube Airleak No 05/29/25 0955   Status Continuous Suction 05/29/25 0955   Suction -20 cm H2O 05/29/25 0955   Dressing Status New dressing applied 05/29/25 0955   Site Assessment Clean, dry & intact 05/29/25 0955       Chest Tube Pleural 2 (Active)   $ Chest tube insertion $ Yes 05/29/25 0955   Chest Tube Airleak

## 2025-05-29 NOTE — OP NOTE
[facility]              [address] [place] [address]                            OPERATIVE REPORT      PATIENT NAME: VALDO STONE               : 1960  MED REC NO: [id number]                        ROOM: 3820  ACCOUNT NO: [id number]                       ADMIT DATE: 2025  PROVIDER: Ernesto Westbrook MD      DATE OF PROCEDURE:  2025    SURGEON:  Ernesto Westbrook MD    PREOPERATIVE DIAGNOSES:  Severe multivessel coronary artery disease, history of multiple percutaneous coronary interventions, hypertension, hyperlipidemia, uncontrolled diabetes, multiple myocardial infarctions.    POSTOPERATIVE DIAGNOSES:  Severe multivessel coronary artery disease, history of multiple percutaneous coronary interventions, hypertension, hyperlipidemia, uncontrolled diabetes, multiple myocardial infarctions.    INDICATIONS:  Severe multivessel coronary artery disease, history of multiple percutaneous coronary interventions, hypertension, hyperlipidemia, uncontrolled diabetes, multiple myocardial infarctions.    ASSISTANT:  Dr. Jerica Bradley (no other resident who was adequately trained was available to assist).  Dr. Bradley was assisting throughout the critical portion of the operation including all the construction of the anastomosis.    SECOND ASSISTANTS:  Antonella Guillen (Buffy Gonzalez harvested the vein).    COMPLICATIONS:  None, tolerated well.    ESTIMATED BLOOD LOSS:  Approximately 1000 mL.    ANESTHESIA:  General endotracheal.    ANESTHESIOLOGY ATTENDING:  Dr. Kiana Herrera.    SPECIMENS:  None.    DRAINS:  Two in the mediastinum, one in the left chest, one in the right chest.    OPERATIONS:    1.  Sternotomy.  2.  Coronary artery bypass grafting x2, left internal mammary artery to the left anterior descending, radial artery to the obtuse marginal.  3. Exploration of the right coronary artery.  4. Left atrial appendage exclusion with 35 mm  10:45:22     T:  05/29/2025 12:07:40     [facility]  Job #:  [id number]     Doc#:  [id number]    CC:   DO Boston Barrera DO John D Scrocco, MD Gaby B Aoun, MD

## 2025-05-29 NOTE — PROGRESS NOTES
CVICU Admission Note    Name: Patrick Pickering  MRN: [id number]    CC: Postoperative Critical Care Management     Indication for Surgery/Procedure: CAD  LVEF:  40% preCPB, Inferior wall akinesis, Anteroseptal hypokinesis remains  EF improved to 60% post on Epi    RVF:  NML    Important/Relevant PMH/PSH: CAD s/p PCI to LAD 10/28/18 with ALFREDO x 2, MI in 2005 and 2010, DM, HTN, HLD, current cigarette smoker, diverticulosis, severe COPD, HFrEF, ICM, Obesity      Procedure/Surgeries: 5/29/2025 Sternotomy/CABG x 2 (LIMA-LAD, Rad-OM)/Exploration RCA/ANGELA exclusion with 35mm AtriClip/Posterior pericardotomy/EVH RLE/Open LUE radial harvest/Rigid internal fixation of the sternum with Rafael plates x 2     Pacing wires: Ventricular       Physical Exam:    BP (!) 105/54   Pulse 77   Temp 96.8 °F (36 °C) (Bladder)   Resp 14   Ht 1.778 m (5' 10\")   Wt 96.6 kg (213 lb)   SpO2 100%   BMI 30.56 kg/m²     Recent Labs     05/29/25  1041   WBC 27.7*   RBC 4.85   HGB 14.2   HCT 44.0   MCV 90.7   MCH 29.3   MCHC 32.3   RDW 13.2      MPV 10.8     Recent Labs     05/29/25  1041 05/29/25  1110     --    K 3.6 3.50     --    CO2 19*  --    BUN 15  --    CREATININE 1.0  --    GLUCOSE 195*  --    CALCIUM 8.9  --          Post operative CXR:        Atelectasis, No pneumothorax noted, Mildly increased vascular markings bilateral, No significant pleural effusion. ETT/lines/drains appear to be in proper position.  Final Radiology report pending.       General Appearance: Arrived to ICU intubated placed on ventilator, Epinephrine for HDS  Eyes: PERRL  Pulmonary: Diminished bibasilar.  No wheezes, no accessory muscle use noted   Ventilator: Mode: AC/VC, 60 FiO2, 8 PEEP, 550 Vt   Cardiovascular: RRR, no heaves or thrills palpated   Telemetry: Initially Vpaced at 80bpm, intrinsic 70s underlying   Abdomen: Soft, OG to LIWS  Extremities: Palpable pulses all extremities, no edema   Neurologic/Psych: Sedated   Skin: Warm and dry

## 2025-05-29 NOTE — PROGRESS NOTES
Patient arrived to CVICU bed 3820 with anesthesia. Patient connected to the ventilator, chest tubes connected to suction, full assessment in progress.     Patient arrived with epi gtt infusing at 2mcg/min, propofol infusing at 25mcg/kg/min. Nini Fernandes NP aware of patient arrival and at the bedside.

## 2025-05-29 NOTE — PROGRESS NOTES
Patient meets requirements to be signed out of anesthesia. Patient on PS. Chest tube output WNL. Patient following commands.

## 2025-05-29 NOTE — PROGRESS NOTES
Patient extubated to 5L NC with RT Miguelangel. Patient speech intact. A&Ox4, DELUCA and follows all commands.    30.6

## 2025-05-29 NOTE — ANESTHESIA PROCEDURE NOTES
Arterial Line:    An arterial line was placed using ultrasound guidance, in the OR for the following indication(s): continuous blood pressure monitoring and blood sampling needed.    A 20 gauge (size), 12 cm (length), Arrow (type) catheter was placed, Seldinger technique used, into the right brachial artery, secured by tape and Tegaderm.  Anesthesia type: Local  Local infiltration: Injection (3 ML 1% LIDOCAINE)    Events:  patient tolerated procedure well with no complications and EBL < 5mL.5/29/2025 6:51 AM5/29/2025 6:56 AM  Anesthesiologist: Kiana Herrera MD  Resident/CRNA: Rajinder Mena APRN - CRNA  Performed: Resident/CRNA

## 2025-05-29 NOTE — ANESTHESIA PROCEDURE NOTES
Central Venous Line:    A central venous line was placed using ultrasound guidance, in the OR for the following indication(s): central venous access and CVP monitoring.    Sterility preparation included the following: provider used sterile gloves, gown, hat and mask, hand hygiene performed prior to central venous catheter insertion, sterile gel and probe cover used in ultrasound-guided central venous catheter insertion and maximum sterile barriers used during central venous catheter insertion.    The patient was placed in Trendelenburg position.The right internal jugular vein was prepped.    The site was prepped with Chloraprep.  A 9 Fr (size), 10 (length), introducer double lumen was placed.    During the procedure, the following specific steps were taken: target vein identified, needle advanced into vein and blood aspirated and guidewire advanced into vein.    Intravenous verification was obtained by ultrasound, venous blood return and manometry.    Post insertion care included: all ports aspirated, all ports flushed easily, guidewire removed intact, Biopatch applied, line sutured in place and dressing applied.    During the procedure the patient experienced: patient tolerated procedure well with no complications.      Outcomes: uncomplicated and patient tolerated procedure well  Real-time US image taken/store: Yes  Anesthesia type: local..No  Staffing  Performed: Anesthesiologist   Anesthesiologist: Kiana Herrera MD  Performed by: Kiana Herrera MD  Authorized by: Kiana Herrera MD    Preanesthetic Checklist  Completed: patient identified, IV checked, site marked, risks and benefits discussed, surgical/procedural consents, equipment checked, pre-op evaluation, timeout performed, anesthesia consent given, oxygen available, monitors applied/VS acknowledged, fire risk safety assessment completed and verbalized and blood product R/B/A discussed and consented

## 2025-05-29 NOTE — ANESTHESIA PRE PROCEDURE
Department of Anesthesiology  Preprocedure Note       Name:  Patrick Pickering   Age:  64 y.o.  :  1960                                          MRN:  [id number]         Date:  2025      Surgeon: Surgeon(s):  Ernesto Westbrook MD    Procedure: Procedure(s):  CORONARY ARTERY BYPASS GRAFTING, RADIAL ARTERY HARVEST    Medications prior to admission:   Prior to Admission medications    Medication Sig Start Date End Date Taking? Authorizing Provider   amiodarone (CORDARONE) 200 MG tablet Take 2 tablets by mouth 2 times daily for 4 days 25 Yes Robyn Blackwell PA   metoprolol succinate (TOPROL XL) 25 MG extended release tablet Take 1 tablet by mouth daily 25  Yes Boston Flowers DO   isosorbide mononitrate (IMDUR) 30 MG extended release tablet Take 1 tablet by mouth daily 25  Yes Boston Flowers DO   glipiZIDE (GLUCOTROL) 5 MG tablet TAKE 1 TABLET BY MOUTH TWICE DAILY (BEFORE BREAKFAST AND BEFORE DINNER) 3/3/25  Yes Johnny Tirado MD   LANTUS SOLOSTAR 100 UNIT/ML injection pen INJECT 20 UNITS SUBCUTANEOUSLY ONCE DAILY IN THE MORNING 3/3/25  Yes Johnny Tirado MD   potassium chloride (KLOR-CON M) 20 MEQ extended release tablet Take 1 tablet by mouth daily 25  Yes Alejo Snyder MD   furosemide (LASIX) 40 MG tablet Take 1 tablet by mouth daily 25  Yes Alejo Snyder MD   VENTOLIN  (90 Base) MCG/ACT inhaler Inhale 1 puff into the lungs 4 times daily as needed for Wheezing 9/3/19  Yes Boston Flowers DO   vitamin D (ERGOCALCIFEROL) 30843 UNITS CAPS capsule Take 5,000 Units by mouth daily  17  Yes Johnny Tirado MD   metFORMIN (GLUCOPHAGE) 1000 MG tablet Take 1 tablet by mouth 2 times daily (with meals) Patient taking 500mg once a day 17  Yes Johnny Tirado MD   aspirin EC 81 MG EC tablet Take 1 tablet by mouth daily   Yes Johnny Tirado MD   lactulose (CHRONULAC) 10 GM/15ML solution Take 15 mLs by mouth every evening for 7 days Ok to

## 2025-05-30 ENCOUNTER — APPOINTMENT (OUTPATIENT)
Dept: GENERAL RADIOLOGY | Age: 65
DRG: 166 | End: 2025-05-30
Attending: THORACIC SURGERY (CARDIOTHORACIC VASCULAR SURGERY)
Payer: MEDICAID

## 2025-05-30 LAB
ALBUMIN SERPL-MCNC: 3.9 G/DL (ref 3.5–5.2)
ALBUMIN SERPL-MCNC: 4.1 G/DL (ref 3.5–5.2)
ALP SERPL-CCNC: 38 U/L (ref 40–129)
ALP SERPL-CCNC: 39 U/L (ref 40–129)
ALT SERPL-CCNC: 13 U/L (ref 0–50)
ALT SERPL-CCNC: 15 U/L (ref 0–40)
ANION GAP SERPL CALCULATED.3IONS-SCNC: 13 MMOL/L (ref 7–16)
ANION GAP SERPL CALCULATED.3IONS-SCNC: 8 MMOL/L (ref 7–16)
AST SERPL-CCNC: 24 U/L (ref 0–39)
AST SERPL-CCNC: 31 U/L (ref 0–50)
B.E.: 4.8 MMOL/L (ref -3–3)
B.E.: 6 MMOL/L (ref -3–3)
BILIRUB SERPL-MCNC: 0.5 MG/DL (ref 0–1.2)
BILIRUB SERPL-MCNC: 0.6 MG/DL (ref 0–1.2)
BUN SERPL-MCNC: 20 MG/DL (ref 6–23)
BUN SERPL-MCNC: 27 MG/DL (ref 8–23)
CA-I BLD-SCNC: 1.14 MMOL/L (ref 1.15–1.33)
CALCIUM SERPL-MCNC: 8.2 MG/DL (ref 8.8–10.2)
CALCIUM SERPL-MCNC: 8.4 MG/DL (ref 8.6–10.2)
CHLORIDE SERPL-SCNC: 105 MMOL/L (ref 98–107)
CHLORIDE SERPL-SCNC: 107 MMOL/L (ref 98–107)
CO2 SERPL-SCNC: 27 MMOL/L (ref 22–29)
CO2 SERPL-SCNC: 29 MMOL/L (ref 22–29)
COHB: 0.1 % (ref 0–1.5)
COHB: 0.5 % (ref 0–1.5)
CREAT SERPL-MCNC: 1 MG/DL (ref 0.7–1.2)
CREAT SERPL-MCNC: 1.1 MG/DL (ref 0.7–1.2)
CRITICAL: ABNORMAL
CRITICAL: ABNORMAL
DATE ANALYZED: ABNORMAL
DATE ANALYZED: ABNORMAL
DATE OF COLLECTION: ABNORMAL
DATE OF COLLECTION: ABNORMAL
ERYTHROCYTE [DISTWIDTH] IN BLOOD BY AUTOMATED COUNT: 13.7 % (ref 11.5–15)
GFR, ESTIMATED: 78 ML/MIN/1.73M2
GFR, ESTIMATED: 89 ML/MIN/1.73M2
GLUCOSE BLD-MCNC: 100 MG/DL (ref 74–99)
GLUCOSE BLD-MCNC: 108 MG/DL (ref 74–99)
GLUCOSE BLD-MCNC: 116 MG/DL (ref 74–99)
GLUCOSE BLD-MCNC: 119 MG/DL (ref 74–99)
GLUCOSE BLD-MCNC: 124 MG/DL (ref 74–99)
GLUCOSE BLD-MCNC: 134 MG/DL (ref 74–99)
GLUCOSE BLD-MCNC: 144 MG/DL (ref 74–99)
GLUCOSE BLD-MCNC: 145 MG/DL (ref 74–99)
GLUCOSE BLD-MCNC: 147 MG/DL (ref 74–99)
GLUCOSE BLD-MCNC: 159 MG/DL (ref 74–99)
GLUCOSE BLD-MCNC: 162 MG/DL (ref 74–99)
GLUCOSE BLD-MCNC: 174 MG/DL (ref 74–99)
GLUCOSE BLD-MCNC: 181 MG/DL (ref 74–99)
GLUCOSE BLD-MCNC: 182 MG/DL (ref 74–99)
GLUCOSE BLD-MCNC: 182 MG/DL (ref 74–99)
GLUCOSE BLD-MCNC: 192 MG/DL (ref 74–99)
GLUCOSE BLD-MCNC: 193 MG/DL (ref 74–99)
GLUCOSE BLD-MCNC: 204 MG/DL (ref 74–99)
GLUCOSE BLD-MCNC: 214 MG/DL (ref 74–99)
GLUCOSE BLD-MCNC: 215 MG/DL (ref 74–99)
GLUCOSE BLD-MCNC: 218 MG/DL (ref 74–99)
GLUCOSE BLD-MCNC: 224 MG/DL (ref 74–99)
GLUCOSE BLD-MCNC: 236 MG/DL (ref 74–99)
GLUCOSE SERPL-MCNC: 102 MG/DL (ref 74–99)
GLUCOSE SERPL-MCNC: 192 MG/DL (ref 74–99)
HCO3: 29.1 MMOL/L (ref 22–26)
HCO3: 29.7 MMOL/L (ref 22–26)
HCT VFR BLD AUTO: 31.2 % (ref 37–54)
HGB BLD-MCNC: 10.3 G/DL (ref 12.5–16.5)
HHB: 5.2 % (ref 0–5)
HHB: 5.3 % (ref 0–5)
INR PPP: 1.3
LAB: ABNORMAL
LAB: ABNORMAL
Lab: 1403
Lab: 405
MAGNESIUM SERPL-MCNC: 2.1 MG/DL (ref 1.6–2.6)
MCH RBC QN AUTO: 29.4 PG (ref 26–35)
MCHC RBC AUTO-ENTMCNC: 33 G/DL (ref 32–34.5)
MCV RBC AUTO: 89.1 FL (ref 80–99.9)
METHB: 0.3 % (ref 0–1.5)
METHB: 0.3 % (ref 0–1.5)
MODE: ABNORMAL
MODE: ABNORMAL
O2 SATURATION: 94.7 % (ref 92–98.5)
O2 SATURATION: 94.8 % (ref 92–98.5)
O2HB: 93.9 % (ref 94–97)
O2HB: 94.4 % (ref 94–97)
OPERATOR ID: 2962
OPERATOR ID: 421
PATIENT TEMP: 37 C
PATIENT TEMP: 37 C
PCO2: 39.7 MMHG (ref 35–45)
PCO2: 41.5 MMHG (ref 35–45)
PH BLOOD GAS: 7.46 (ref 7.35–7.45)
PH BLOOD GAS: 7.49 (ref 7.35–7.45)
PLATELET # BLD AUTO: 173 K/UL (ref 130–450)
PMV BLD AUTO: 10.9 FL (ref 7–12)
PO2: 68.6 MMHG (ref 75–100)
PO2: 71 MMHG (ref 75–100)
POTASSIUM SERPL-SCNC: 3.8 MMOL/L (ref 3.5–5)
POTASSIUM SERPL-SCNC: 4 MMOL/L (ref 3.5–5)
POTASSIUM SERPL-SCNC: 4.2 MMOL/L (ref 3.5–5.1)
POTASSIUM SERPL-SCNC: 4.2 MMOL/L (ref 3.5–5.1)
PROT SERPL-MCNC: 5.3 G/DL (ref 6.4–8.3)
PROT SERPL-MCNC: 5.5 G/DL (ref 6.4–8.3)
PROTHROMBIN TIME: 13.8 SEC (ref 9.3–12.4)
RBC # BLD AUTO: 3.5 M/UL (ref 3.8–5.8)
SODIUM SERPL-SCNC: 143 MMOL/L (ref 136–145)
SODIUM SERPL-SCNC: 147 MMOL/L (ref 132–146)
SOURCE, BLOOD GAS: ABNORMAL
SOURCE, BLOOD GAS: ABNORMAL
THB: 10.1 G/DL (ref 11.5–16.5)
THB: 11 G/DL (ref 11.5–16.5)
TIME ANALYZED: 1404
TIME ANALYZED: 417
WBC OTHER # BLD: 21.5 K/UL (ref 4.5–11.5)

## 2025-05-30 PROCEDURE — 2700000000 HC OXYGEN THERAPY PER DAY

## 2025-05-30 PROCEDURE — 37799 UNLISTED PX VASCULAR SURGERY: CPT

## 2025-05-30 PROCEDURE — 6370000000 HC RX 637 (ALT 250 FOR IP): Performed by: NURSE PRACTITIONER

## 2025-05-30 PROCEDURE — 2580000003 HC RX 258

## 2025-05-30 PROCEDURE — 71045 X-RAY EXAM CHEST 1 VIEW: CPT

## 2025-05-30 PROCEDURE — 94669 MECHANICAL CHEST WALL OSCILL: CPT

## 2025-05-30 PROCEDURE — 82962 GLUCOSE BLOOD TEST: CPT

## 2025-05-30 PROCEDURE — 36569 INSJ PICC 5 YR+ W/O IMAGING: CPT

## 2025-05-30 PROCEDURE — 85027 COMPLETE CBC AUTOMATED: CPT

## 2025-05-30 PROCEDURE — P9047 ALBUMIN (HUMAN), 25%, 50ML: HCPCS | Performed by: THORACIC SURGERY (CARDIOTHORACIC VASCULAR SURGERY)

## 2025-05-30 PROCEDURE — 2000000000 HC ICU R&B

## 2025-05-30 PROCEDURE — 2500000003 HC RX 250 WO HCPCS

## 2025-05-30 PROCEDURE — C1751 CATH, INF, PER/CENT/MIDLINE: HCPCS

## 2025-05-30 PROCEDURE — 6370000000 HC RX 637 (ALT 250 FOR IP)

## 2025-05-30 PROCEDURE — 99232 SBSQ HOSP IP/OBS MODERATE 35: CPT | Performed by: NURSE PRACTITIONER

## 2025-05-30 PROCEDURE — 85610 PROTHROMBIN TIME: CPT

## 2025-05-30 PROCEDURE — 84132 ASSAY OF SERUM POTASSIUM: CPT

## 2025-05-30 PROCEDURE — 82330 ASSAY OF CALCIUM: CPT

## 2025-05-30 PROCEDURE — 76937 US GUIDE VASCULAR ACCESS: CPT

## 2025-05-30 PROCEDURE — 82805 BLOOD GASES W/O2 SATURATION: CPT

## 2025-05-30 PROCEDURE — 2500000003 HC RX 250 WO HCPCS: Performed by: NURSE PRACTITIONER

## 2025-05-30 PROCEDURE — 6370000000 HC RX 637 (ALT 250 FOR IP): Performed by: THORACIC SURGERY (CARDIOTHORACIC VASCULAR SURGERY)

## 2025-05-30 PROCEDURE — 83735 ASSAY OF MAGNESIUM: CPT

## 2025-05-30 PROCEDURE — 80053 COMPREHEN METABOLIC PANEL: CPT

## 2025-05-30 PROCEDURE — 94640 AIRWAY INHALATION TREATMENT: CPT

## 2025-05-30 PROCEDURE — 6360000002 HC RX W HCPCS: Performed by: THORACIC SURGERY (CARDIOTHORACIC VASCULAR SURGERY)

## 2025-05-30 PROCEDURE — 6360000002 HC RX W HCPCS: Performed by: NURSE PRACTITIONER

## 2025-05-30 PROCEDURE — 6360000002 HC RX W HCPCS

## 2025-05-30 PROCEDURE — 2580000003 HC RX 258: Performed by: NURSE PRACTITIONER

## 2025-05-30 RX ORDER — SODIUM CHLORIDE 0.9 % (FLUSH) 0.9 %
5-40 SYRINGE (ML) INJECTION EVERY 12 HOURS SCHEDULED
Status: DISCONTINUED | OUTPATIENT
Start: 2025-05-30 | End: 2025-06-01

## 2025-05-30 RX ORDER — ALBUMIN (HUMAN) 12.5 G/50ML
50 SOLUTION INTRAVENOUS ONCE
Status: COMPLETED | OUTPATIENT
Start: 2025-05-30 | End: 2025-05-30

## 2025-05-30 RX ORDER — VASOPRESSIN 20 [USP'U]/ML
INJECTION, SOLUTION INTRAVENOUS
Status: DISPENSED
Start: 2025-05-30 | End: 2025-05-30

## 2025-05-30 RX ORDER — SODIUM CHLORIDE 9 MG/ML
INJECTION, SOLUTION INTRAVENOUS
Status: DISPENSED
Start: 2025-05-30 | End: 2025-05-30

## 2025-05-30 RX ORDER — SODIUM CHLORIDE 0.9 % (FLUSH) 0.9 %
5-40 SYRINGE (ML) INJECTION PRN
Status: DISCONTINUED | OUTPATIENT
Start: 2025-05-30 | End: 2025-06-01

## 2025-05-30 RX ORDER — LIDOCAINE HYDROCHLORIDE 10 MG/ML
50 INJECTION, SOLUTION EPIDURAL; INFILTRATION; INTRACAUDAL; PERINEURAL ONCE
Status: DISCONTINUED | OUTPATIENT
Start: 2025-05-30 | End: 2025-06-01

## 2025-05-30 RX ORDER — MIDODRINE HYDROCHLORIDE 10 MG/1
10 TABLET ORAL
Status: DISCONTINUED | OUTPATIENT
Start: 2025-05-30 | End: 2025-06-05 | Stop reason: HOSPADM

## 2025-05-30 RX ORDER — SODIUM CHLORIDE 9 MG/ML
INJECTION, SOLUTION INTRAVENOUS PRN
Status: DISCONTINUED | OUTPATIENT
Start: 2025-05-30 | End: 2025-06-01

## 2025-05-30 RX ADMIN — Medication 400 MG: at 09:11

## 2025-05-30 RX ADMIN — ACETAMINOPHEN 1000 MG: 500 TABLET ORAL at 00:04

## 2025-05-30 RX ADMIN — VASOPRESSIN 0.03 UNITS/MIN: 20 INJECTION INTRAVENOUS at 21:45

## 2025-05-30 RX ADMIN — POLYETHYLENE GLYCOL 3350 17 G: 17 POWDER, FOR SOLUTION ORAL at 21:06

## 2025-05-30 RX ADMIN — EPINEPHRINE 4 MCG/MIN: 1 INJECTION, SOLUTION, CONCENTRATE INTRAVENOUS at 06:49

## 2025-05-30 RX ADMIN — MUPIROCIN: 20 OINTMENT TOPICAL at 09:14

## 2025-05-30 RX ADMIN — SODIUM CHLORIDE 8.7 UNITS/HR: 9 INJECTION, SOLUTION INTRAVENOUS at 15:23

## 2025-05-30 RX ADMIN — ATORVASTATIN CALCIUM 40 MG: 40 TABLET, FILM COATED ORAL at 21:06

## 2025-05-30 RX ADMIN — ASPIRIN 81 MG: 81 TABLET, COATED ORAL at 09:11

## 2025-05-30 RX ADMIN — SENNOSIDES AND DOCUSATE SODIUM 1 TABLET: 50; 8.6 TABLET ORAL at 21:09

## 2025-05-30 RX ADMIN — KETOROLAC TROMETHAMINE 15 MG: 30 INJECTION, SOLUTION INTRAMUSCULAR at 05:15

## 2025-05-30 RX ADMIN — MUPIROCIN: 20 OINTMENT TOPICAL at 21:08

## 2025-05-30 RX ADMIN — SODIUM CHLORIDE, PRESERVATIVE FREE 10 ML: 5 INJECTION INTRAVENOUS at 09:14

## 2025-05-30 RX ADMIN — AMIODARONE HYDROCHLORIDE 400 MG: 200 TABLET ORAL at 09:11

## 2025-05-30 RX ADMIN — ACETAMINOPHEN 1000 MG: 500 TABLET ORAL at 06:28

## 2025-05-30 RX ADMIN — ACETAMINOPHEN 1000 MG: 500 TABLET ORAL at 18:23

## 2025-05-30 RX ADMIN — SODIUM CHLORIDE, PRESERVATIVE FREE 10 ML: 5 INJECTION INTRAVENOUS at 21:06

## 2025-05-30 RX ADMIN — IPRATROPIUM BROMIDE AND ALBUTEROL SULFATE 1 DOSE: 2.5; .5 SOLUTION RESPIRATORY (INHALATION) at 14:45

## 2025-05-30 RX ADMIN — ACETAMINOPHEN 1000 MG: 500 TABLET ORAL at 11:53

## 2025-05-30 RX ADMIN — ALBUMIN (HUMAN) 50 G: 0.25 INJECTION, SOLUTION INTRAVENOUS at 18:24

## 2025-05-30 RX ADMIN — WATER 2000 MG: 1 INJECTION INTRAMUSCULAR; INTRAVENOUS; SUBCUTANEOUS at 02:10

## 2025-05-30 RX ADMIN — KETOROLAC TROMETHAMINE 15 MG: 30 INJECTION, SOLUTION INTRAMUSCULAR at 21:08

## 2025-05-30 RX ADMIN — POTASSIUM CHLORIDE 20 MEQ: 29.8 INJECTION, SOLUTION INTRAVENOUS at 06:47

## 2025-05-30 RX ADMIN — SODIUM BICARBONATE: 84 INJECTION, SOLUTION INTRAVENOUS at 03:50

## 2025-05-30 RX ADMIN — BISACODYL 5 MG: 5 TABLET, COATED ORAL at 09:11

## 2025-05-30 RX ADMIN — CHLORHEXIDINE GLUCONATE, 0.12% ORAL RINSE 15 ML: 1.2 SOLUTION DENTAL at 21:05

## 2025-05-30 RX ADMIN — WATER 2000 MG: 1 INJECTION INTRAMUSCULAR; INTRAVENOUS; SUBCUTANEOUS at 10:47

## 2025-05-30 RX ADMIN — IPRATROPIUM BROMIDE AND ALBUTEROL SULFATE 1 DOSE: 2.5; .5 SOLUTION RESPIRATORY (INHALATION) at 08:52

## 2025-05-30 RX ADMIN — KETOROLAC TROMETHAMINE 15 MG: 30 INJECTION, SOLUTION INTRAMUSCULAR at 11:53

## 2025-05-30 RX ADMIN — SODIUM CHLORIDE: 0.9 INJECTION, SOLUTION INTRAVENOUS at 03:50

## 2025-05-30 RX ADMIN — SENNOSIDES AND DOCUSATE SODIUM 1 TABLET: 50; 8.6 TABLET ORAL at 09:11

## 2025-05-30 RX ADMIN — IPRATROPIUM BROMIDE AND ALBUTEROL SULFATE 1 DOSE: 2.5; .5 SOLUTION RESPIRATORY (INHALATION) at 10:55

## 2025-05-30 RX ADMIN — PANTOPRAZOLE SODIUM 40 MG: 40 TABLET, DELAYED RELEASE ORAL at 06:29

## 2025-05-30 RX ADMIN — OXYCODONE 5 MG: 5 TABLET ORAL at 16:09

## 2025-05-30 RX ADMIN — OXYCODONE 5 MG: 5 TABLET ORAL at 20:30

## 2025-05-30 RX ADMIN — IPRATROPIUM BROMIDE AND ALBUTEROL SULFATE 1 DOSE: 2.5; .5 SOLUTION RESPIRATORY (INHALATION) at 19:34

## 2025-05-30 RX ADMIN — POLYETHYLENE GLYCOL 3350 17 G: 17 POWDER, FOR SOLUTION ORAL at 09:11

## 2025-05-30 RX ADMIN — TAMSULOSIN HYDROCHLORIDE 0.4 MG: 0.4 CAPSULE ORAL at 09:11

## 2025-05-30 RX ADMIN — CHLORHEXIDINE GLUCONATE, 0.12% ORAL RINSE 15 ML: 1.2 SOLUTION DENTAL at 09:11

## 2025-05-30 RX ADMIN — WATER 2000 MG: 1 INJECTION INTRAMUSCULAR; INTRAVENOUS; SUBCUTANEOUS at 18:24

## 2025-05-30 RX ADMIN — INSULIN GLARGINE 14 UNITS: 100 INJECTION, SOLUTION SUBCUTANEOUS at 21:06

## 2025-05-30 RX ADMIN — HYDROMORPHONE HYDROCHLORIDE 0.25 MG: 1 INJECTION, SOLUTION INTRAMUSCULAR; INTRAVENOUS; SUBCUTANEOUS at 04:56

## 2025-05-30 RX ADMIN — KETOROLAC TROMETHAMINE 15 MG: 30 INJECTION, SOLUTION INTRAMUSCULAR at 18:24

## 2025-05-30 RX ADMIN — MIDODRINE HYDROCHLORIDE 10 MG: 10 TABLET ORAL at 18:23

## 2025-05-30 RX ADMIN — ALBUMIN (HUMAN) 50 G: 0.25 INJECTION, SOLUTION INTRAVENOUS at 08:20

## 2025-05-30 RX ADMIN — AMIODARONE HYDROCHLORIDE 400 MG: 200 TABLET ORAL at 21:05

## 2025-05-30 RX ADMIN — VASOPRESSIN 0.02 UNITS/MIN: 20 INJECTION INTRAVENOUS at 05:10

## 2025-05-30 RX ADMIN — Medication 400 MG: at 21:06

## 2025-05-30 ASSESSMENT — PAIN DESCRIPTION - ORIENTATION
ORIENTATION: MID

## 2025-05-30 ASSESSMENT — PAIN SCALES - GENERAL
PAINLEVEL_OUTOF10: 6
PAINLEVEL_OUTOF10: 4
PAINLEVEL_OUTOF10: 0
PAINLEVEL_OUTOF10: 4
PAINLEVEL_OUTOF10: 2
PAINLEVEL_OUTOF10: 0
PAINLEVEL_OUTOF10: 0
PAINLEVEL_OUTOF10: 6

## 2025-05-30 ASSESSMENT — PAIN - FUNCTIONAL ASSESSMENT
PAIN_FUNCTIONAL_ASSESSMENT: ACTIVITIES ARE NOT PREVENTED

## 2025-05-30 ASSESSMENT — PAIN DESCRIPTION - FREQUENCY: FREQUENCY: CONTINUOUS

## 2025-05-30 ASSESSMENT — PAIN DESCRIPTION - DESCRIPTORS
DESCRIPTORS: ACHING;DISCOMFORT;SORE
DESCRIPTORS: ACHING;SORE
DESCRIPTORS: ACHING;SORE;DISCOMFORT

## 2025-05-30 ASSESSMENT — PAIN DESCRIPTION - LOCATION
LOCATION: CHEST;INCISION
LOCATION: INCISION
LOCATION: CHEST

## 2025-05-30 ASSESSMENT — PAIN DESCRIPTION - ONSET: ONSET: ON-GOING

## 2025-05-30 ASSESSMENT — PAIN DESCRIPTION - PAIN TYPE: TYPE: SURGICAL PAIN

## 2025-05-30 NOTE — PROGRESS NOTES
Reached out to Dr. Westbrook about patient maps being in the low 60s.     Orders given :   Keep maps above 60  200cc 25% albumin  Midodrine 10mg PO TID

## 2025-05-30 NOTE — PLAN OF CARE
Oriana MA RN)  Skin Integrity Remains Intact:   Monitor for areas of redness and/or skin breakdown   Assess vascular access sites hourly   Turn and reposition as indicated   Assess need for specialty bed     Problem: Musculoskeletal - Adult  Goal: Return mobility to safest level of function  Outcome: Progressing  Flowsheets (Taken 5/29/2025 2000 by Oriana Hartman, RN)  Return Mobility to Safest Level of Function:   Assess patient stability and activity tolerance for standing, transferring and ambulating with or without assistive devices   Obtain physical therapy/occupational therapy consults as needed     Problem: Gastrointestinal - Adult  Goal: Minimal or absence of nausea and vomiting  Outcome: Progressing  Flowsheets (Taken 5/29/2025 2000 by Oriana Hartman RN)  Minimal or absence of nausea and vomiting:   Advance diet as tolerated, if ordered   Provide nonpharmacologic comfort measures as appropriate   Administer ordered antiemetic medications as needed  Goal: Maintains or returns to baseline bowel function  Outcome: Progressing  Flowsheets (Taken 5/29/2025 2000 by Oriana Hartman, RN)  Maintains or returns to baseline bowel function:   Assess bowel function   Encourage oral fluids to ensure adequate hydration     Problem: Genitourinary - Adult  Goal: Absence of urinary retention  Outcome: Progressing  Flowsheets (Taken 5/29/2025 2000 by Oriana Hartman, RN)  Absence of urinary retention:   Assess patient’s ability to void and empty bladder   Monitor intake/output and perform bladder scan as needed

## 2025-05-30 NOTE — CARE COORDINATION
5/30 Care Coordination:Pt in [facility]. POD#1, S/P CABG x 2. Extubated on 3 liters O2. Cont IV Epi,Vaso.   CM spoke with pt and his brother in his room. Introduced self and role of CM.PTA pt was living alone. He has someone that will be staying with him for 2 weeks when discharged. Pt was Independent. Plan is Home. He is agreeable to [facility], Used [facility] in the past but had no preference as log as accepts his Insurance. Referral sent to [facility].  CM/SW will continue to follow for discharge planning.   Jimmie PEREZ,RN-CV-BC  [phone]

## 2025-05-30 NOTE — PROGRESS NOTES
4 Eyes Skin Assessment     NAME:  Patrick Pickering  YOB: 1960  MEDICAL RECORD NUMBER:  [id number]    The patient is being assessed for  Admission    I agree that at least one RN has performed a thorough Head to Toe Skin Assessment on the patient. ALL assessment sites listed below have been assessed.      Areas assessed by both nurses:    Head, Face, Ears, Shoulders, Back, Chest, Arms, Elbows, Hands, Sacrum. Buttock, Coccyx, Ischium, Legs. Feet and Heels, and Under Medical Devices         Does the Patient have a Wound? No noted wound(s)       Faustino Prevention initiated by RN: Yes  Wound Care Orders initiated by RN: No    Pressure Injury (Stage 3,4, Unstageable, DTI, NWPT, and Complex wounds) if present, place Wound referral order by RN under : No    New Ostomies, if present place, Ostomy referral order under : No     Nurse 1 eSignature: Electronically signed by Oriana Hartman RN on 5/30/25 at 2:40 AM EDT    **SHARE this note so that the co-signing nurse can place an eSignature**    Nurse 2 eSignature: Electronically signed by Chris Henderson RN on 5/30/25 at 7:34 AM EDT

## 2025-05-30 NOTE — PROGRESS NOTES
Spiritual Health History and Assessment/Progress Note  JONNATHAN Chavarria    (P) Spiritual/Emotional Needs,  ,  ,      Name: Patrick Pickering MRN: [id number]    Age: 64 y.o.     Sex: male   Language: English   Congregational: Unknown   Coronary artery disease     Date: 5/30/2025                           Spiritual Assessment began in [facility]        Referral/Consult From: (P) Rounding   Encounter Overview/Reason: (P) Spiritual/Emotional Needs  Service Provided For: (P) Patient    Vee, Belief, Meaning:   Patient identifies as spiritual  Family/Friends No family/friends present      Importance and Influence:  Patient has spiritual/personal beliefs that influence decisions regarding their health  Family/Friends No family/friends present    Community:  Patient feels well-supported. Support system includes: Extended family  Family/Friends No family/friends present    Assessment and Plan of Care:     Patient Interventions include: Facilitated expression of thoughts and feelings  Family/Friends Interventions include: No family/friends present    Patient Plan of Care: No spiritual needs identified for follow-up  Family/Friends Plan of Care: No family/friends present    Electronically signed by Chaplain ANA MARÍA on 5/30/2025 at 2:37 PM

## 2025-05-30 NOTE — PROGRESS NOTES
CVICU Progress Note    Name: Patrick Pickering  MRN: [id number]    CC: Postoperative Critical Care Management     Indication for Surgery/Procedure: CAD  LVEF:  40% preCPB, Inferior wall akinesis, Anteroseptal hypokinesis remains  EF improved to 60% post on Epi    RVF:  NML     Important/Relevant PMH/PSH: CAD s/p PCI to LAD 10/28/18 with ALFREDO x 2, MI in 2005 and 2010, DM, HTN, HLD, current cigarette smoker, diverticulosis, severe COPD, HFrEF, ICM, Obesity       Procedure/Surgeries: 5/29/2025 Sternotomy/CABG x 2 (LIMA-LAD, Rad-OM)/Exploration RCA/ANGELA exclusion with 35mm AtriClip/Posterior pericardotomy/EVH RLE/Open LUE radial harvest/Rigid internal fixation of the sternum with Rafael plates x 2      Pacing wires: Ventricular          Intake/Output Summary (Last 24 hours) at 5/30/2025 0812  Last data filed at 5/30/2025 0700  Gross per 24 hour   Intake 7008.39 ml   Output 3525 ml   Net 3483.39 ml       Recent Labs     05/29/25  1041 05/29/25  1550 05/30/25  0402   WBC 27.7*  --  21.5*   HGB 14.2 11.9* 10.3*   HCT 44.0 37.4 31.2*     --  173      Lab Results   Component Value Date/Time     05/30/2025 04:02 AM    K 3.8 05/30/2025 04:02 AM    K 3.7 10/28/2018 04:24 AM     05/30/2025 04:02 AM    CO2 27 05/30/2025 04:02 AM    BUN 20 05/30/2025 04:02 AM    CREATININE 1.0 05/30/2025 04:02 AM    GLUCOSE 102 05/30/2025 04:02 AM    GLUCOSE Negative 05/16/2025 02:50 PM    CALCIUM 8.4 05/30/2025 04:02 AM    MG 2.1 05/30/2025 04:02 AM    PHOS 2.3 10/29/2018 05:16 AM         Physical Exam:    BP (!) 105/54   Pulse 95   Temp 99.5 °F (37.5 °C) (Bladder)   Resp 25   Ht 1.778 m (5' 10\")   Wt 108 kg (238 lb 1.6 oz)   SpO2 92%   BMI 34.16 kg/m²       CXR Findings: 5/30/2025    IMPRESSION:  No significant interval change. Left-sided infiltrative change and chest tube  are unchanged.  --  CXR personally viewed and interpreted by ICU Nurse Practitioner, agree with above findings     General: Awake, alert. Denies SOB,  palpitations   Eyes: PERRL, anicteric   Pulmonary: Diminished bibasilar. No wheezes, no accessory muscle use noted on 3L NC  Cardiovascular:  RRR, no heaves or thrills on palpation  Tele: SR   Abdomen: Soft, nontender, + BS   Extremities: Palpable pulses all extremities, no edema   Neurologic/Psych: A&Ox3, DELUCA to command   Skin: Warm and dry  Incisions: MSI with anderson dressing intact, LUE Radial harvest site soft well approximated, CONNIE. SVG sites well approximated       Assessment/Plan: POD #1  1. CAD S/p Sternotomy/CABG x 2 (LIMA-LAD, Rad-OM)/Exploration RCA/ANGELA exclusion with 35mm AtriClip/Posterior pericardotomy/EVH RLE/Open LUE radial harvest/Rigid internal fixation of the sternum with Riley plates x 2   - ASA, Lipitor   - LUE radial harvest, start nitrate once off inopressors    - Pre-op Amio for afib prophylaxis given: Yes Continued Postop: Yes, po started 5/30  - Perioperative Ancef  - MS chest tube with small airleak, placed to waterseal for now. Pl drains remain to wall suction.  -PT/OT    2. Acute Pulmonary Insufficiency Following Surgery    - Expected 2/2 surgery  - 3L NC. IS, flutter, Ezpap, nebs. OOBTC     3. Postoperative Hypotension  -EF improved to 60% with inotropic support  -Epinephrine at 4mcg/min, Vasopressin at 0.02 units/min. Give 50g Albumin     4. Metabolic Acidosis   -Bicarb gtt overnight, now off.      5. Acute Post Operative Pain   - Scheduled tylenol, toradol, lidocaine patch; PRN oxycodone     6. DMII   -Hemoglobin A1c 8.9  -RHI gtt while on inopressors     VTE Prophylaxis: Pharmacologic/Mechanical:  Yes, SCDs   Line infection prevention: Can CVC or arterial line be removed: No  Continued need for urinary catheter:  Yes - clinical indication: Patient post major surgery requiring fluid balance and input and output measurement.    Dispo: CVICU     Electronically signed by LYUBOV Wan - CNP on 5/30/2025 at 8:12 AM

## 2025-05-30 NOTE — PROGRESS NOTES
Vascular Access Procedure Note    Procedure Date:   5/30/2025    Pre-procedure Verification/Time-Out:  The proposed risks versus benefits of this procedure were discussed in detail by the physician with patient. written consent was obtained from the patient. Relevant documentation was reviewed prior to procedure including signed consent form and medications.   All necessary equipment for procedure is available at time of procedure yes.  An audible time out was done at 1320PM by team zi, correctly identifying patients name, medical record number, correct side, correct site, and correct procedure to be performed with registered nurse members of the procedure team all in agreement.        Indication for Procedure:   Reason for Insertion: other multi meds    ASA Assessment (Required for Moderate & Deep Sedation):      Procedure:   Reason for Consultation: power PICC line    Clinician Performing Procedure:   MARY KATE Carl RN    Assistant:  none    Sedation:   Analgesia Used: lidocaine 1%    Procedure Details/Findings:  Catheter Uzbek Size: 5.5  Lot Number: [id number]  Product #: [id number]  Expiration Date: 02/27/2026  Maximum Barrier Precautions: cap, eye shield, full body drape, gloves, gown, handwashing, and mask  Skin Prep: chlorhexidine  Technique: modified seldinger and ultrasound guided with VPS  Attempts: 1  Exposed (cm): 1  Total (cm): 43  Placement Site: left brachial vein  Vessel Size: 0.50  Dressing: securement device, transparent dressing, and biopatch  Blood Return: Yes   Ultrasound Guidance: Yes   Arm Circumference Mid-Bicep (cm): 27  Chest X-Ray Ordered: VasoNova VPS  End Placement: caj/lower svc    Complications:   none     Post-operative Condition:  stable  Patient Tolerated Procedure: well     Comments/Post-operative Education:   Post Procedure Interventions: patient verbalized understanding of education    Valerio Carl RN  05/30/25  1:53 PM

## 2025-05-30 NOTE — ACP (ADVANCE CARE PLANNING)
Advance Care Planning   Healthcare Decision Maker:    Primary Decision Maker: Norberto Pickering - Brother/Sister - [phone]    Click here to complete Healthcare Decision Makers including selection of the Healthcare Decision Maker Relationship (ie \"Primary\").

## 2025-05-30 NOTE — PROGRESS NOTES
Physical Therapy    PT consult to evaluate/treat received and appreciated.  Pt chart reviewed and evaluation attempted x2.  First attempt pt having line placed.  Second attempt pt hypotensive and on hold.  Will check back as able.  Thank you.        Abhinav Hines, PT, DPT   [id number]

## 2025-05-30 NOTE — PROGRESS NOTES
Patient admitted to [facility] with the following belongings: Glasses, Wallet, Cell Phone, Cell Phone , Pants, Shirt, Socks, and Shoes. The following belongings were sent home with the patient's family:  ALL patient belongings noted on admission.

## 2025-05-30 NOTE — PROGRESS NOTES
Dr. Westbrook notified of 2010 ABG results and current hemodynamics/ gtts. See orders for one amp of HC03 and 200cc of 25% albumin.

## 2025-05-30 NOTE — PLAN OF CARE
Problem: Chronic Conditions and Co-morbidities  Goal: Patient's chronic conditions and co-morbidity symptoms are monitored and maintained or improved  5/30/2025 1950 by Xochitl Shah RN  Outcome: Progressing  Flowsheets (Taken 5/30/2025 1950)  Care Plan - Patient's Chronic Conditions and Co-Morbidity Symptoms are Monitored and Maintained or Improved:   Monitor and assess patient's chronic conditions and comorbid symptoms for stability, deterioration, or improvement   Collaborate with multidisciplinary team to address chronic and comorbid conditions and prevent exacerbation or deterioration   Update acute care plan with appropriate goals if chronic or comorbid symptoms are exacerbated and prevent overall improvement and discharge  5/30/2025 1129 by Medhat Guzman RN  Outcome: Progressing  Flowsheets (Taken 5/29/2025 2000 by Oriana Hartman, RN)  Care Plan - Patient's Chronic Conditions and Co-Morbidity Symptoms are Monitored and Maintained or Improved:   Monitor and assess patient's chronic conditions and comorbid symptoms for stability, deterioration, or improvement   Collaborate with multidisciplinary team to address chronic and comorbid conditions and prevent exacerbation or deterioration   Update acute care plan with appropriate goals if chronic or comorbid symptoms are exacerbated and prevent overall improvement and discharge     Problem: Discharge Planning  Goal: Discharge to home or other facility with appropriate resources  5/30/2025 1129 by Medhat Guzman RN  Outcome: Progressing  Flowsheets (Taken 5/29/2025 2000 by Oriana Hartman, RN)  Discharge to home or other facility with appropriate resources:   Identify barriers to discharge with patient and caregiver   Identify discharge learning needs (meds, wound care, etc)     Problem: Pain  Goal: Verbalizes/displays adequate comfort level or baseline comfort level  5/30/2025 1950 by Xochitl Shah, RN  Outcome: Progressing  Flowsheets

## 2025-05-30 NOTE — PROGRESS NOTES
OT consult received and appreciated. Chart reviewed and consulted RN. Attempted in PM- pt with other staff members for line placement- upon second attempt pt on hold d/t hypotension. Will evaluate at a later time.     Ivett Keene, OTR/L # [id number]

## 2025-05-31 ENCOUNTER — APPOINTMENT (OUTPATIENT)
Dept: GENERAL RADIOLOGY | Age: 65
DRG: 166 | End: 2025-05-31
Attending: THORACIC SURGERY (CARDIOTHORACIC VASCULAR SURGERY)
Payer: MEDICAID

## 2025-05-31 LAB
ABO + RH BLD: NORMAL
ABO/RH: NORMAL
ALBUMIN SERPL-MCNC: 4 G/DL (ref 3.5–5.2)
ALP SERPL-CCNC: 43 U/L (ref 40–129)
ALT SERPL-CCNC: 18 U/L (ref 0–50)
ANION GAP SERPL CALCULATED.3IONS-SCNC: 8 MMOL/L (ref 7–16)
ANTIBODY SCREEN: NEGATIVE
ARM BAND NUMBER: NORMAL
ARM BAND NUMBER: NORMAL
AST SERPL-CCNC: 33 U/L (ref 0–50)
B.E.: -0.3 MMOL/L (ref -3–3)
B.E.: 2.5 MMOL/L (ref -3–3)
BILIRUB SERPL-MCNC: 0.4 MG/DL (ref 0–1.2)
BLOOD BANK DISPENSE STATUS: NORMAL
BLOOD BANK SAMPLE EXPIRATION: NORMAL
BLOOD BANK SAMPLE EXPIRATION: NORMAL
BLOOD GROUP ANTIBODIES SERPL: NEGATIVE
BPU ID: NORMAL
BUN SERPL-MCNC: 36 MG/DL (ref 8–23)
CA-I BLD-SCNC: 1.15 MMOL/L (ref 1.15–1.33)
CALCIUM SERPL-MCNC: 8.4 MG/DL (ref 8.8–10.2)
CHLORIDE SERPL-SCNC: 106 MMOL/L (ref 98–107)
CO2 SERPL-SCNC: 28 MMOL/L (ref 22–29)
COHB: 0 % (ref 0–1.5)
COHB: 0.6 % (ref 0–1.5)
COMPONENT: NORMAL
CREAT SERPL-MCNC: 1 MG/DL (ref 0.7–1.2)
CRITICAL: ABNORMAL
CRITICAL: ABNORMAL
CROSSMATCH RESULT: NORMAL
DATE ANALYZED: ABNORMAL
DATE ANALYZED: ABNORMAL
DATE OF COLLECTION: ABNORMAL
DATE OF COLLECTION: ABNORMAL
ERYTHROCYTE [DISTWIDTH] IN BLOOD BY AUTOMATED COUNT: 14.2 % (ref 11.5–15)
GFR, ESTIMATED: 85 ML/MIN/1.73M2
GLUCOSE BLD-MCNC: 102 MG/DL (ref 74–99)
GLUCOSE BLD-MCNC: 106 MG/DL (ref 74–99)
GLUCOSE BLD-MCNC: 114 MG/DL (ref 74–99)
GLUCOSE BLD-MCNC: 122 MG/DL (ref 74–99)
GLUCOSE BLD-MCNC: 130 MG/DL (ref 74–99)
GLUCOSE BLD-MCNC: 132 MG/DL (ref 74–99)
GLUCOSE BLD-MCNC: 136 MG/DL (ref 74–99)
GLUCOSE BLD-MCNC: 172 MG/DL (ref 74–99)
GLUCOSE BLD-MCNC: 178 MG/DL (ref 74–99)
GLUCOSE BLD-MCNC: 179 MG/DL (ref 74–99)
GLUCOSE BLD-MCNC: 187 MG/DL (ref 74–99)
GLUCOSE BLD-MCNC: 200 MG/DL (ref 74–99)
GLUCOSE BLD-MCNC: 212 MG/DL (ref 74–99)
GLUCOSE BLD-MCNC: 93 MG/DL (ref 74–99)
GLUCOSE BLD-MCNC: 94 MG/DL (ref 74–99)
GLUCOSE BLD-MCNC: 96 MG/DL (ref 74–99)
GLUCOSE BLD-MCNC: 98 MG/DL (ref 74–99)
GLUCOSE SERPL-MCNC: 99 MG/DL (ref 74–99)
HCO3: 25 MMOL/L (ref 22–26)
HCO3: 27.6 MMOL/L (ref 22–26)
HCT VFR BLD AUTO: 30.2 % (ref 37–54)
HGB BLD-MCNC: 9.5 G/DL (ref 12.5–16.5)
HHB: 3.4 % (ref 0–5)
HHB: 6.1 % (ref 0–5)
LAB: ABNORMAL
LAB: ABNORMAL
Lab: 1112
Lab: 415
MAGNESIUM SERPL-MCNC: 2.3 MG/DL (ref 1.6–2.4)
MCH RBC QN AUTO: 29 PG (ref 26–35)
MCHC RBC AUTO-ENTMCNC: 31.5 G/DL (ref 32–34.5)
MCV RBC AUTO: 92.1 FL (ref 80–99.9)
METHB: 0.3 % (ref 0–1.5)
METHB: 0.3 % (ref 0–1.5)
MODE: ABNORMAL
MODE: ABNORMAL
O2 SATURATION: 93.8 % (ref 92–98.5)
O2 SATURATION: 96.6 % (ref 92–98.5)
O2HB: 93 % (ref 94–97)
O2HB: 96.3 % (ref 94–97)
OPERATOR ID: 467
OPERATOR ID: ABNORMAL
PATIENT TEMP: 37 C
PATIENT TEMP: 37 C
PCO2: 43.7 MMHG (ref 35–45)
PCO2: 44.9 MMHG (ref 35–45)
PH BLOOD GAS: 7.38 (ref 7.35–7.45)
PH BLOOD GAS: 7.41 (ref 7.35–7.45)
PLATELET # BLD AUTO: 145 K/UL (ref 130–450)
PMV BLD AUTO: 10.9 FL (ref 7–12)
PO2: 68.8 MMHG (ref 75–100)
PO2: 93.6 MMHG (ref 75–100)
POTASSIUM SERPL-SCNC: 4.3 MMOL/L (ref 3.5–5)
POTASSIUM SERPL-SCNC: 4.3 MMOL/L (ref 3.5–5.1)
POTASSIUM SERPL-SCNC: 4.44 MMOL/L (ref 3.5–5)
PROT SERPL-MCNC: 5.6 G/DL (ref 6.4–8.3)
RBC # BLD AUTO: 3.28 M/UL (ref 3.8–5.8)
SODIUM SERPL-SCNC: 142 MMOL/L (ref 136–145)
SOURCE, BLOOD GAS: ABNORMAL
SOURCE, BLOOD GAS: ABNORMAL
THB: 10.3 G/DL (ref 11.5–16.5)
THB: 9.8 G/DL (ref 11.5–16.5)
TIME ANALYZED: 1113
TIME ANALYZED: 423
TRANSFUSION STATUS: NORMAL
UNIT DIVISION: 0
WBC OTHER # BLD: 18.7 K/UL (ref 4.5–11.5)

## 2025-05-31 PROCEDURE — 2000000000 HC ICU R&B

## 2025-05-31 PROCEDURE — 6360000002 HC RX W HCPCS: Performed by: NURSE PRACTITIONER

## 2025-05-31 PROCEDURE — 82805 BLOOD GASES W/O2 SATURATION: CPT

## 2025-05-31 PROCEDURE — 2700000000 HC OXYGEN THERAPY PER DAY

## 2025-05-31 PROCEDURE — 84132 ASSAY OF SERUM POTASSIUM: CPT

## 2025-05-31 PROCEDURE — 2580000003 HC RX 258

## 2025-05-31 PROCEDURE — 6360000002 HC RX W HCPCS: Performed by: THORACIC SURGERY (CARDIOTHORACIC VASCULAR SURGERY)

## 2025-05-31 PROCEDURE — 94640 AIRWAY INHALATION TREATMENT: CPT

## 2025-05-31 PROCEDURE — 94669 MECHANICAL CHEST WALL OSCILL: CPT

## 2025-05-31 PROCEDURE — 6370000000 HC RX 637 (ALT 250 FOR IP)

## 2025-05-31 PROCEDURE — 2500000003 HC RX 250 WO HCPCS

## 2025-05-31 PROCEDURE — 6370000000 HC RX 637 (ALT 250 FOR IP): Performed by: THORACIC SURGERY (CARDIOTHORACIC VASCULAR SURGERY)

## 2025-05-31 PROCEDURE — 86850 RBC ANTIBODY SCREEN: CPT

## 2025-05-31 PROCEDURE — 86900 BLOOD TYPING SEROLOGIC ABO: CPT

## 2025-05-31 PROCEDURE — 37799 UNLISTED PX VASCULAR SURGERY: CPT

## 2025-05-31 PROCEDURE — 2580000003 HC RX 258: Performed by: NURSE PRACTITIONER

## 2025-05-31 PROCEDURE — 2500000003 HC RX 250 WO HCPCS: Performed by: NURSE PRACTITIONER

## 2025-05-31 PROCEDURE — 71045 X-RAY EXAM CHEST 1 VIEW: CPT

## 2025-05-31 PROCEDURE — 6360000002 HC RX W HCPCS

## 2025-05-31 PROCEDURE — 85027 COMPLETE CBC AUTOMATED: CPT

## 2025-05-31 PROCEDURE — 82330 ASSAY OF CALCIUM: CPT

## 2025-05-31 PROCEDURE — 6370000000 HC RX 637 (ALT 250 FOR IP): Performed by: NURSE PRACTITIONER

## 2025-05-31 PROCEDURE — 83735 ASSAY OF MAGNESIUM: CPT

## 2025-05-31 PROCEDURE — P9047 ALBUMIN (HUMAN), 25%, 50ML: HCPCS | Performed by: THORACIC SURGERY (CARDIOTHORACIC VASCULAR SURGERY)

## 2025-05-31 PROCEDURE — 86901 BLOOD TYPING SEROLOGIC RH(D): CPT

## 2025-05-31 PROCEDURE — 80053 COMPREHEN METABOLIC PANEL: CPT

## 2025-05-31 PROCEDURE — 82962 GLUCOSE BLOOD TEST: CPT

## 2025-05-31 RX ORDER — ALBUMIN (HUMAN) 12.5 G/50ML
50 SOLUTION INTRAVENOUS ONCE
Status: COMPLETED | OUTPATIENT
Start: 2025-05-31 | End: 2025-05-31

## 2025-05-31 RX ORDER — ENOXAPARIN SODIUM 100 MG/ML
30 INJECTION SUBCUTANEOUS 2 TIMES DAILY
Status: DISCONTINUED | OUTPATIENT
Start: 2025-05-31 | End: 2025-06-05 | Stop reason: HOSPADM

## 2025-05-31 RX ADMIN — MIDODRINE HYDROCHLORIDE 10 MG: 10 TABLET ORAL at 11:45

## 2025-05-31 RX ADMIN — OXYCODONE 5 MG: 5 TABLET ORAL at 02:47

## 2025-05-31 RX ADMIN — ACETAMINOPHEN 1000 MG: 500 TABLET ORAL at 00:30

## 2025-05-31 RX ADMIN — IBUPROFEN 400 MG: 400 TABLET, FILM COATED ORAL at 08:34

## 2025-05-31 RX ADMIN — LACTULOSE 20 G: 20 SOLUTION ORAL at 08:35

## 2025-05-31 RX ADMIN — ASPIRIN 81 MG: 81 TABLET, COATED ORAL at 08:34

## 2025-05-31 RX ADMIN — INSULIN LISPRO 4 UNITS: 100 INJECTION, SOLUTION INTRAVENOUS; SUBCUTANEOUS at 20:27

## 2025-05-31 RX ADMIN — OXYCODONE HYDROCHLORIDE 10 MG: 10 TABLET ORAL at 20:30

## 2025-05-31 RX ADMIN — BISACODYL 5 MG: 5 TABLET, COATED ORAL at 08:34

## 2025-05-31 RX ADMIN — MUPIROCIN: 20 OINTMENT TOPICAL at 20:28

## 2025-05-31 RX ADMIN — PANTOPRAZOLE SODIUM 40 MG: 40 TABLET, DELAYED RELEASE ORAL at 05:30

## 2025-05-31 RX ADMIN — Medication 400 MG: at 20:28

## 2025-05-31 RX ADMIN — OXYCODONE 5 MG: 5 TABLET ORAL at 11:53

## 2025-05-31 RX ADMIN — SODIUM CHLORIDE, PRESERVATIVE FREE 10 ML: 5 INJECTION INTRAVENOUS at 08:35

## 2025-05-31 RX ADMIN — TAMSULOSIN HYDROCHLORIDE 0.4 MG: 0.4 CAPSULE ORAL at 08:34

## 2025-05-31 RX ADMIN — SENNOSIDES AND DOCUSATE SODIUM 1 TABLET: 50; 8.6 TABLET ORAL at 20:29

## 2025-05-31 RX ADMIN — ENOXAPARIN SODIUM 30 MG: 100 INJECTION SUBCUTANEOUS at 08:35

## 2025-05-31 RX ADMIN — IPRATROPIUM BROMIDE AND ALBUTEROL SULFATE 1 DOSE: 2.5; .5 SOLUTION RESPIRATORY (INHALATION) at 15:34

## 2025-05-31 RX ADMIN — ENOXAPARIN SODIUM 30 MG: 100 INJECTION SUBCUTANEOUS at 20:26

## 2025-05-31 RX ADMIN — POLYETHYLENE GLYCOL 3350 17 G: 17 POWDER, FOR SOLUTION ORAL at 08:35

## 2025-05-31 RX ADMIN — SENNOSIDES AND DOCUSATE SODIUM 1 TABLET: 50; 8.6 TABLET ORAL at 08:33

## 2025-05-31 RX ADMIN — INSULIN GLARGINE 14 UNITS: 100 INJECTION, SOLUTION SUBCUTANEOUS at 20:27

## 2025-05-31 RX ADMIN — SODIUM CHLORIDE, PRESERVATIVE FREE 10 ML: 5 INJECTION INTRAVENOUS at 20:29

## 2025-05-31 RX ADMIN — AMIODARONE HYDROCHLORIDE 400 MG: 200 TABLET ORAL at 08:33

## 2025-05-31 RX ADMIN — ALBUMIN (HUMAN) 50 G: 0.25 INJECTION, SOLUTION INTRAVENOUS at 07:28

## 2025-05-31 RX ADMIN — IPRATROPIUM BROMIDE AND ALBUTEROL SULFATE 1 DOSE: 2.5; .5 SOLUTION RESPIRATORY (INHALATION) at 08:02

## 2025-05-31 RX ADMIN — MUPIROCIN: 20 OINTMENT TOPICAL at 10:36

## 2025-05-31 RX ADMIN — ATORVASTATIN CALCIUM 40 MG: 40 TABLET, FILM COATED ORAL at 20:26

## 2025-05-31 RX ADMIN — IPRATROPIUM BROMIDE AND ALBUTEROL SULFATE 1 DOSE: 2.5; .5 SOLUTION RESPIRATORY (INHALATION) at 12:45

## 2025-05-31 RX ADMIN — AMIODARONE HYDROCHLORIDE 400 MG: 200 TABLET ORAL at 20:26

## 2025-05-31 RX ADMIN — Medication 400 MG: at 08:34

## 2025-05-31 RX ADMIN — WATER 2000 MG: 1 INJECTION INTRAMUSCULAR; INTRAVENOUS; SUBCUTANEOUS at 02:00

## 2025-05-31 RX ADMIN — ONDANSETRON 4 MG: 2 INJECTION, SOLUTION INTRAMUSCULAR; INTRAVENOUS at 22:36

## 2025-05-31 RX ADMIN — EPINEPHRINE 3 MCG/MIN: 1 INJECTION, SOLUTION, CONCENTRATE INTRAVENOUS at 07:28

## 2025-05-31 RX ADMIN — IPRATROPIUM BROMIDE AND ALBUTEROL SULFATE 1 DOSE: 2.5; .5 SOLUTION RESPIRATORY (INHALATION) at 19:55

## 2025-05-31 RX ADMIN — HYDROMORPHONE HYDROCHLORIDE 0.5 MG: 1 INJECTION, SOLUTION INTRAMUSCULAR; INTRAVENOUS; SUBCUTANEOUS at 05:29

## 2025-05-31 RX ADMIN — POLYETHYLENE GLYCOL 3350 17 G: 17 POWDER, FOR SOLUTION ORAL at 20:28

## 2025-05-31 RX ADMIN — MIDODRINE HYDROCHLORIDE 10 MG: 10 TABLET ORAL at 15:44

## 2025-05-31 RX ADMIN — ACETAMINOPHEN 1000 MG: 500 TABLET ORAL at 07:18

## 2025-05-31 RX ADMIN — SODIUM CHLORIDE 5.5 UNITS/HR: 9 INJECTION, SOLUTION INTRAVENOUS at 15:33

## 2025-05-31 RX ADMIN — IBUPROFEN 400 MG: 400 TABLET, FILM COATED ORAL at 15:44

## 2025-05-31 RX ADMIN — MIDODRINE HYDROCHLORIDE 10 MG: 10 TABLET ORAL at 08:33

## 2025-05-31 ASSESSMENT — PAIN DESCRIPTION - ORIENTATION
ORIENTATION: MID

## 2025-05-31 ASSESSMENT — PAIN DESCRIPTION - DESCRIPTORS
DESCRIPTORS: ACHING
DESCRIPTORS: ACHING;DULL;DISCOMFORT
DESCRIPTORS: ACHING
DESCRIPTORS: ACHING

## 2025-05-31 ASSESSMENT — PAIN SCALES - GENERAL
PAINLEVEL_OUTOF10: 0
PAINLEVEL_OUTOF10: 5
PAINLEVEL_OUTOF10: 0
PAINLEVEL_OUTOF10: 2
PAINLEVEL_OUTOF10: 7
PAINLEVEL_OUTOF10: 0
PAINLEVEL_OUTOF10: 4
PAINLEVEL_OUTOF10: 0
PAINLEVEL_OUTOF10: 0
PAINLEVEL_OUTOF10: 4
PAINLEVEL_OUTOF10: 0
PAINLEVEL_OUTOF10: 8

## 2025-05-31 ASSESSMENT — PAIN DESCRIPTION - ONSET
ONSET: GRADUAL
ONSET: ON-GOING
ONSET: GRADUAL

## 2025-05-31 ASSESSMENT — PAIN DESCRIPTION - PAIN TYPE
TYPE: SURGICAL PAIN
TYPE: ACUTE PAIN;SURGICAL PAIN

## 2025-05-31 ASSESSMENT — PAIN - FUNCTIONAL ASSESSMENT
PAIN_FUNCTIONAL_ASSESSMENT: ACTIVITIES ARE NOT PREVENTED
PAIN_FUNCTIONAL_ASSESSMENT: ACTIVITIES ARE NOT PREVENTED
PAIN_FUNCTIONAL_ASSESSMENT: PREVENTS OR INTERFERES SOME ACTIVE ACTIVITIES AND ADLS
PAIN_FUNCTIONAL_ASSESSMENT: ACTIVITIES ARE NOT PREVENTED

## 2025-05-31 ASSESSMENT — PAIN DESCRIPTION - LOCATION
LOCATION: CHEST;GENERALIZED;INCISION
LOCATION: CHEST

## 2025-05-31 ASSESSMENT — PAIN DESCRIPTION - FREQUENCY
FREQUENCY: INTERMITTENT
FREQUENCY: INTERMITTENT

## 2025-05-31 NOTE — PROGRESS NOTES
Stable.  Low dose Vaso and Epi.  MAP is 75- wean drips.  Midodrine added.  Continue ICU.  Ernesto Westbrook MD

## 2025-05-31 NOTE — PLAN OF CARE
Problem: Respiratory - Adult  Goal: Achieves optimal ventilation and oxygenation  Note: Monitor resp status sao2 and breathe sounds O2 changed to high flow 12l due low sao2 titrate as mando     Problem: Cardiovascular - Adult  Goal: Maintains optimal cardiac output and hemodynamic stability  Note: Monitor vital signs wean drips as ordered monitor fluid balance and lab values     Problem: Metabolic/Fluid and Electrolytes - Adult  Goal: Hemodynamic stability and optimal renal function maintained  Note: Monitor lab values replace as ordered monitor fluid balance

## 2025-06-01 ENCOUNTER — APPOINTMENT (OUTPATIENT)
Dept: GENERAL RADIOLOGY | Age: 65
DRG: 166 | End: 2025-06-01
Attending: THORACIC SURGERY (CARDIOTHORACIC VASCULAR SURGERY)
Payer: MEDICAID

## 2025-06-01 LAB
ALBUMIN SERPL-MCNC: 3.9 G/DL (ref 3.5–5.2)
ALP SERPL-CCNC: 61 U/L (ref 40–129)
ALT SERPL-CCNC: 14 U/L (ref 0–50)
ANION GAP SERPL CALCULATED.3IONS-SCNC: 9 MMOL/L (ref 7–16)
AST SERPL-CCNC: 29 U/L (ref 0–50)
BILIRUB SERPL-MCNC: 0.5 MG/DL (ref 0–1.2)
BUN SERPL-MCNC: 28 MG/DL (ref 8–23)
CA-I BLD-SCNC: 1.18 MMOL/L (ref 1.15–1.33)
CALCIUM SERPL-MCNC: 8.9 MG/DL (ref 8.8–10.2)
CHLORIDE SERPL-SCNC: 106 MMOL/L (ref 98–107)
CO2 SERPL-SCNC: 26 MMOL/L (ref 22–29)
CREAT SERPL-MCNC: 0.8 MG/DL (ref 0.7–1.2)
ERYTHROCYTE [DISTWIDTH] IN BLOOD BY AUTOMATED COUNT: 14 % (ref 11.5–15)
GFR, ESTIMATED: >90 ML/MIN/1.73M2
GLUCOSE BLD-MCNC: 140 MG/DL (ref 74–99)
GLUCOSE BLD-MCNC: 142 MG/DL (ref 74–99)
GLUCOSE BLD-MCNC: 143 MG/DL (ref 74–99)
GLUCOSE BLD-MCNC: 166 MG/DL (ref 74–99)
GLUCOSE BLD-MCNC: 189 MG/DL (ref 74–99)
GLUCOSE BLD-MCNC: 194 MG/DL (ref 74–99)
GLUCOSE SERPL-MCNC: 159 MG/DL (ref 74–99)
HCT VFR BLD AUTO: 33.7 % (ref 37–54)
HGB BLD-MCNC: 10.4 G/DL (ref 12.5–16.5)
MAGNESIUM SERPL-MCNC: 2.2 MG/DL (ref 1.6–2.4)
MCH RBC QN AUTO: 29 PG (ref 26–35)
MCHC RBC AUTO-ENTMCNC: 30.9 G/DL (ref 32–34.5)
MCV RBC AUTO: 93.9 FL (ref 80–99.9)
PLATELET # BLD AUTO: 151 K/UL (ref 130–450)
PMV BLD AUTO: 11.2 FL (ref 7–12)
POTASSIUM SERPL-SCNC: 4.3 MMOL/L (ref 3.5–5.1)
PROT SERPL-MCNC: 5.9 G/DL (ref 6.4–8.3)
RBC # BLD AUTO: 3.59 M/UL (ref 3.8–5.8)
SODIUM SERPL-SCNC: 141 MMOL/L (ref 136–145)
WBC OTHER # BLD: 14 K/UL (ref 4.5–11.5)

## 2025-06-01 PROCEDURE — 36415 COLL VENOUS BLD VENIPUNCTURE: CPT

## 2025-06-01 PROCEDURE — 82962 GLUCOSE BLOOD TEST: CPT

## 2025-06-01 PROCEDURE — P9047 ALBUMIN (HUMAN), 25%, 50ML: HCPCS | Performed by: THORACIC SURGERY (CARDIOTHORACIC VASCULAR SURGERY)

## 2025-06-01 PROCEDURE — 6370000000 HC RX 637 (ALT 250 FOR IP): Performed by: PHYSICIAN ASSISTANT

## 2025-06-01 PROCEDURE — 6370000000 HC RX 637 (ALT 250 FOR IP): Performed by: NURSE PRACTITIONER

## 2025-06-01 PROCEDURE — 80053 COMPREHEN METABOLIC PANEL: CPT

## 2025-06-01 PROCEDURE — 71045 X-RAY EXAM CHEST 1 VIEW: CPT

## 2025-06-01 PROCEDURE — 6370000000 HC RX 637 (ALT 250 FOR IP)

## 2025-06-01 PROCEDURE — 83735 ASSAY OF MAGNESIUM: CPT

## 2025-06-01 PROCEDURE — 2500000003 HC RX 250 WO HCPCS

## 2025-06-01 PROCEDURE — 2700000000 HC OXYGEN THERAPY PER DAY

## 2025-06-01 PROCEDURE — 6360000002 HC RX W HCPCS: Performed by: THORACIC SURGERY (CARDIOTHORACIC VASCULAR SURGERY)

## 2025-06-01 PROCEDURE — 94640 AIRWAY INHALATION TREATMENT: CPT

## 2025-06-01 PROCEDURE — 82330 ASSAY OF CALCIUM: CPT

## 2025-06-01 PROCEDURE — 2140000000 HC CCU INTERMEDIATE R&B

## 2025-06-01 PROCEDURE — 6370000000 HC RX 637 (ALT 250 FOR IP): Performed by: THORACIC SURGERY (CARDIOTHORACIC VASCULAR SURGERY)

## 2025-06-01 PROCEDURE — 85027 COMPLETE CBC AUTOMATED: CPT

## 2025-06-01 PROCEDURE — 2500000003 HC RX 250 WO HCPCS: Performed by: NURSE PRACTITIONER

## 2025-06-01 PROCEDURE — 94669 MECHANICAL CHEST WALL OSCILL: CPT

## 2025-06-01 RX ORDER — BISACODYL 5 MG/1
5 TABLET, DELAYED RELEASE ORAL DAILY
Status: DISCONTINUED | OUTPATIENT
Start: 2025-06-02 | End: 2025-06-05 | Stop reason: HOSPADM

## 2025-06-01 RX ORDER — BISACODYL 10 MG
10 SUPPOSITORY, RECTAL RECTAL DAILY
Status: DISCONTINUED | OUTPATIENT
Start: 2025-06-02 | End: 2025-06-05 | Stop reason: HOSPADM

## 2025-06-01 RX ORDER — FOLIC ACID 1 MG/1
1 TABLET ORAL DAILY
Status: DISCONTINUED | OUTPATIENT
Start: 2025-06-02 | End: 2025-06-05 | Stop reason: HOSPADM

## 2025-06-01 RX ORDER — DIPHENHYDRAMINE HCL 25 MG
25 TABLET ORAL EVERY 8 HOURS PRN
Status: DISCONTINUED | OUTPATIENT
Start: 2025-06-01 | End: 2025-06-05 | Stop reason: HOSPADM

## 2025-06-01 RX ORDER — ALBUMIN (HUMAN) 12.5 G/50ML
25 SOLUTION INTRAVENOUS ONCE
Status: COMPLETED | OUTPATIENT
Start: 2025-06-01 | End: 2025-06-01

## 2025-06-01 RX ORDER — SENNA AND DOCUSATE SODIUM 50; 8.6 MG/1; MG/1
1 TABLET, FILM COATED ORAL 2 TIMES DAILY
Status: DISCONTINUED | OUTPATIENT
Start: 2025-06-01 | End: 2025-06-05 | Stop reason: HOSPADM

## 2025-06-01 RX ORDER — ONDANSETRON 2 MG/ML
4 INJECTION INTRAMUSCULAR; INTRAVENOUS EVERY 8 HOURS PRN
Status: DISCONTINUED | OUTPATIENT
Start: 2025-06-01 | End: 2025-06-05 | Stop reason: HOSPADM

## 2025-06-01 RX ORDER — DEXTROSE MONOHYDRATE 100 MG/ML
INJECTION, SOLUTION INTRAVENOUS CONTINUOUS PRN
Status: DISCONTINUED | OUTPATIENT
Start: 2025-06-01 | End: 2025-06-05 | Stop reason: HOSPADM

## 2025-06-01 RX ORDER — MAGNESIUM SULFATE IN WATER 40 MG/ML
2000 INJECTION, SOLUTION INTRAVENOUS PRN
Status: DISCONTINUED | OUTPATIENT
Start: 2025-06-01 | End: 2025-06-05 | Stop reason: HOSPADM

## 2025-06-01 RX ORDER — INSULIN LISPRO 100 [IU]/ML
0-16 INJECTION, SOLUTION INTRAVENOUS; SUBCUTANEOUS
Status: DISCONTINUED | OUTPATIENT
Start: 2025-06-01 | End: 2025-06-05 | Stop reason: HOSPADM

## 2025-06-01 RX ORDER — AMIODARONE HYDROCHLORIDE 200 MG/1
400 TABLET ORAL PRN
Status: DISCONTINUED | OUTPATIENT
Start: 2025-06-01 | End: 2025-06-05 | Stop reason: HOSPADM

## 2025-06-01 RX ORDER — ASCORBIC ACID 500 MG
500 TABLET ORAL 2 TIMES DAILY
Status: DISCONTINUED | OUTPATIENT
Start: 2025-06-01 | End: 2025-06-05 | Stop reason: HOSPADM

## 2025-06-01 RX ORDER — CALCIUM CARBONATE 500 MG/1
500 TABLET, CHEWABLE ORAL 3 TIMES DAILY PRN
Status: DISCONTINUED | OUTPATIENT
Start: 2025-06-01 | End: 2025-06-05 | Stop reason: HOSPADM

## 2025-06-01 RX ORDER — POLYETHYLENE GLYCOL 3350 17 G/17G
17 POWDER, FOR SOLUTION ORAL DAILY
Status: DISCONTINUED | OUTPATIENT
Start: 2025-06-02 | End: 2025-06-01

## 2025-06-01 RX ORDER — ISOSORBIDE DINITRATE 10 MG/1
5 TABLET ORAL 3 TIMES DAILY
Status: DISCONTINUED | OUTPATIENT
Start: 2025-06-01 | End: 2025-06-05 | Stop reason: HOSPADM

## 2025-06-01 RX ORDER — FERROUS SULFATE 325(65) MG
325 TABLET ORAL 2 TIMES DAILY WITH MEALS
Status: DISCONTINUED | OUTPATIENT
Start: 2025-06-02 | End: 2025-06-05 | Stop reason: HOSPADM

## 2025-06-01 RX ORDER — POLYETHYLENE GLYCOL 3350 17 G/17G
17 POWDER, FOR SOLUTION ORAL 2 TIMES DAILY
Status: DISCONTINUED | OUTPATIENT
Start: 2025-06-01 | End: 2025-06-05 | Stop reason: HOSPADM

## 2025-06-01 RX ORDER — POTASSIUM CHLORIDE 1500 MG/1
20 TABLET, EXTENDED RELEASE ORAL PRN
Status: DISCONTINUED | OUTPATIENT
Start: 2025-06-01 | End: 2025-06-05 | Stop reason: HOSPADM

## 2025-06-01 RX ORDER — PROCHLORPERAZINE EDISYLATE 5 MG/ML
10 INJECTION INTRAMUSCULAR; INTRAVENOUS EVERY 6 HOURS PRN
Status: DISCONTINUED | OUTPATIENT
Start: 2025-06-01 | End: 2025-06-05 | Stop reason: HOSPADM

## 2025-06-01 RX ORDER — GLUCAGON 1 MG/ML
1 KIT INJECTION PRN
Status: DISCONTINUED | OUTPATIENT
Start: 2025-06-01 | End: 2025-06-05 | Stop reason: HOSPADM

## 2025-06-01 RX ORDER — FUROSEMIDE 10 MG/ML
20 INJECTION INTRAMUSCULAR; INTRAVENOUS ONCE
Status: COMPLETED | OUTPATIENT
Start: 2025-06-01 | End: 2025-06-01

## 2025-06-01 RX ORDER — ASPIRIN 81 MG/1
81 TABLET ORAL DAILY
Status: DISCONTINUED | OUTPATIENT
Start: 2025-06-02 | End: 2025-06-05 | Stop reason: HOSPADM

## 2025-06-01 RX ADMIN — ISOSORBIDE DINITRATE 5 MG: 10 TABLET ORAL at 15:43

## 2025-06-01 RX ADMIN — MIDODRINE HYDROCHLORIDE 10 MG: 10 TABLET ORAL at 15:43

## 2025-06-01 RX ADMIN — BISACODYL 10 MG: 10 SUPPOSITORY RECTAL at 08:03

## 2025-06-01 RX ADMIN — SODIUM CHLORIDE, PRESERVATIVE FREE 10 ML: 5 INJECTION INTRAVENOUS at 20:11

## 2025-06-01 RX ADMIN — IBUPROFEN 400 MG: 400 TABLET, FILM COATED ORAL at 00:07

## 2025-06-01 RX ADMIN — OXYCODONE HYDROCHLORIDE 10 MG: 10 TABLET ORAL at 00:10

## 2025-06-01 RX ADMIN — SENNOSIDES AND DOCUSATE SODIUM 1 TABLET: 50; 8.6 TABLET ORAL at 08:01

## 2025-06-01 RX ADMIN — ENOXAPARIN SODIUM 30 MG: 100 INJECTION SUBCUTANEOUS at 08:02

## 2025-06-01 RX ADMIN — ISOSORBIDE DINITRATE 5 MG: 10 TABLET ORAL at 09:28

## 2025-06-01 RX ADMIN — MUPIROCIN: 20 OINTMENT TOPICAL at 20:13

## 2025-06-01 RX ADMIN — MIDODRINE HYDROCHLORIDE 10 MG: 10 TABLET ORAL at 11:25

## 2025-06-01 RX ADMIN — IPRATROPIUM BROMIDE AND ALBUTEROL SULFATE 1 DOSE: 2.5; .5 SOLUTION RESPIRATORY (INHALATION) at 11:12

## 2025-06-01 RX ADMIN — Medication 400 MG: at 08:04

## 2025-06-01 RX ADMIN — IPRATROPIUM BROMIDE AND ALBUTEROL SULFATE 1 DOSE: 2.5; .5 SOLUTION RESPIRATORY (INHALATION) at 08:36

## 2025-06-01 RX ADMIN — SODIUM CHLORIDE, PRESERVATIVE FREE 10 ML: 5 INJECTION INTRAVENOUS at 20:12

## 2025-06-01 RX ADMIN — OXYCODONE 5 MG: 5 TABLET ORAL at 08:02

## 2025-06-01 RX ADMIN — MUPIROCIN: 20 OINTMENT TOPICAL at 08:03

## 2025-06-01 RX ADMIN — ASPIRIN 81 MG: 81 TABLET, COATED ORAL at 08:01

## 2025-06-01 RX ADMIN — ALBUMIN (HUMAN) 25 G: 0.25 INJECTION, SOLUTION INTRAVENOUS at 09:29

## 2025-06-01 RX ADMIN — BISACODYL 5 MG: 5 TABLET, COATED ORAL at 08:04

## 2025-06-01 RX ADMIN — PANTOPRAZOLE SODIUM 40 MG: 40 TABLET, DELAYED RELEASE ORAL at 08:01

## 2025-06-01 RX ADMIN — INSULIN GLARGINE 14 UNITS: 100 INJECTION, SOLUTION SUBCUTANEOUS at 20:10

## 2025-06-01 RX ADMIN — MIDODRINE HYDROCHLORIDE 10 MG: 10 TABLET ORAL at 08:01

## 2025-06-01 RX ADMIN — ENOXAPARIN SODIUM 30 MG: 100 INJECTION SUBCUTANEOUS at 20:10

## 2025-06-01 RX ADMIN — AMIODARONE HYDROCHLORIDE 400 MG: 200 TABLET ORAL at 08:01

## 2025-06-01 RX ADMIN — Medication 400 MG: at 20:11

## 2025-06-01 RX ADMIN — AMIODARONE HYDROCHLORIDE 400 MG: 200 TABLET ORAL at 20:09

## 2025-06-01 RX ADMIN — Medication 500 MG: at 22:03

## 2025-06-01 RX ADMIN — POLYETHYLENE GLYCOL 3350 17 G: 17 POWDER, FOR SOLUTION ORAL at 08:02

## 2025-06-01 RX ADMIN — POLYETHYLENE GLYCOL 3350 17 G: 17 POWDER, FOR SOLUTION ORAL at 22:04

## 2025-06-01 RX ADMIN — TAMSULOSIN HYDROCHLORIDE 0.4 MG: 0.4 CAPSULE ORAL at 08:04

## 2025-06-01 RX ADMIN — SENNOSIDES AND DOCUSATE SODIUM 1 TABLET: 50; 8.6 TABLET ORAL at 22:04

## 2025-06-01 RX ADMIN — FUROSEMIDE 20 MG: 10 INJECTION, SOLUTION INTRAMUSCULAR; INTRAVENOUS at 09:28

## 2025-06-01 RX ADMIN — SODIUM CHLORIDE, PRESERVATIVE FREE 10 ML: 5 INJECTION INTRAVENOUS at 08:05

## 2025-06-01 RX ADMIN — OXYCODONE HYDROCHLORIDE 10 MG: 10 TABLET ORAL at 03:31

## 2025-06-01 RX ADMIN — SODIUM CHLORIDE, PRESERVATIVE FREE 10 ML: 5 INJECTION INTRAVENOUS at 08:06

## 2025-06-01 RX ADMIN — ACETAMINOPHEN 650 MG: 325 TABLET ORAL at 15:42

## 2025-06-01 RX ADMIN — IPRATROPIUM BROMIDE AND ALBUTEROL SULFATE 1 DOSE: 2.5; .5 SOLUTION RESPIRATORY (INHALATION) at 14:33

## 2025-06-01 RX ADMIN — LACTULOSE 20 G: 20 SOLUTION ORAL at 08:02

## 2025-06-01 RX ADMIN — POLYETHYLENE GLYCOL 3350 17 G: 17 POWDER, FOR SOLUTION ORAL at 20:11

## 2025-06-01 RX ADMIN — ATORVASTATIN CALCIUM 40 MG: 40 TABLET, FILM COATED ORAL at 20:09

## 2025-06-01 RX ADMIN — OXYCODONE 5 MG: 5 TABLET ORAL at 22:03

## 2025-06-01 RX ADMIN — SENNOSIDES AND DOCUSATE SODIUM 1 TABLET: 50; 8.6 TABLET ORAL at 20:12

## 2025-06-01 RX ADMIN — PANTOPRAZOLE SODIUM 40 MG: 40 TABLET, DELAYED RELEASE ORAL at 05:46

## 2025-06-01 RX ADMIN — INSULIN LISPRO 4 UNITS: 100 INJECTION, SOLUTION INTRAVENOUS; SUBCUTANEOUS at 10:29

## 2025-06-01 RX ADMIN — ISOSORBIDE DINITRATE 5 MG: 10 TABLET ORAL at 20:10

## 2025-06-01 ASSESSMENT — PAIN DESCRIPTION - DESCRIPTORS
DESCRIPTORS: ACHING
DESCRIPTORS: ACHING;DISCOMFORT;SORE
DESCRIPTORS: DULL;ACHING;NAGGING
DESCRIPTORS: ACHING;DULL;NAGGING

## 2025-06-01 ASSESSMENT — PAIN SCALES - GENERAL
PAINLEVEL_OUTOF10: 0
PAINLEVEL_OUTOF10: 7
PAINLEVEL_OUTOF10: 0
PAINLEVEL_OUTOF10: 3
PAINLEVEL_OUTOF10: 2
PAINLEVEL_OUTOF10: 0
PAINLEVEL_OUTOF10: 8
PAINLEVEL_OUTOF10: 0
PAINLEVEL_OUTOF10: 3
PAINLEVEL_OUTOF10: 4
PAINLEVEL_OUTOF10: 5
PAINLEVEL_OUTOF10: 3
PAINLEVEL_OUTOF10: 2
PAINLEVEL_OUTOF10: 2
PAINLEVEL_OUTOF10: 0
PAINLEVEL_OUTOF10: 5
PAINLEVEL_OUTOF10: 0

## 2025-06-01 ASSESSMENT — PAIN - FUNCTIONAL ASSESSMENT
PAIN_FUNCTIONAL_ASSESSMENT: PREVENTS OR INTERFERES SOME ACTIVE ACTIVITIES AND ADLS
PAIN_FUNCTIONAL_ASSESSMENT: ACTIVITIES ARE NOT PREVENTED
PAIN_FUNCTIONAL_ASSESSMENT: PREVENTS OR INTERFERES SOME ACTIVE ACTIVITIES AND ADLS
PAIN_FUNCTIONAL_ASSESSMENT: ACTIVITIES ARE NOT PREVENTED

## 2025-06-01 ASSESSMENT — PAIN DESCRIPTION - FREQUENCY
FREQUENCY: INTERMITTENT

## 2025-06-01 ASSESSMENT — PAIN DESCRIPTION - PAIN TYPE
TYPE: SURGICAL PAIN
TYPE: ACUTE PAIN;SURGICAL PAIN
TYPE: SURGICAL PAIN
TYPE: ACUTE PAIN;SURGICAL PAIN

## 2025-06-01 ASSESSMENT — PAIN DESCRIPTION - ONSET
ONSET: GRADUAL

## 2025-06-01 ASSESSMENT — PAIN DESCRIPTION - LOCATION
LOCATION: CHEST;GENERALIZED;INCISION
LOCATION: CHEST;INCISION;FLANK
LOCATION: CHEST
LOCATION: CHEST
LOCATION: CHEST;GENERALIZED;INCISION

## 2025-06-01 ASSESSMENT — PAIN DESCRIPTION - ORIENTATION
ORIENTATION: MID
ORIENTATION: LEFT;RIGHT;MID
ORIENTATION: MID

## 2025-06-01 NOTE — FLOWSHEET NOTE
[facility], AWAITING FOR BED TO BE CLEANED

## 2025-06-01 NOTE — FLOWSHEET NOTE
[facility] was called to give report for room 6521; RN states already received report. Will ready patient for transfer.  Patient states he will notify family members of new room number.

## 2025-06-01 NOTE — PLAN OF CARE
Problem: Chronic Conditions and Co-morbidities  Goal: Patient's chronic conditions and co-morbidity symptoms are monitored and maintained or improved  Outcome: Progressing     Problem: Discharge Planning  Goal: Discharge to home or other facility with appropriate resources  Outcome: Progressing     Problem: Pain  Goal: Verbalizes/displays adequate comfort level or baseline comfort level  Outcome: Progressing     Problem: Safety - Adult  Goal: Free from fall injury  Outcome: Progressing     Problem: Respiratory - Adult  Goal: Achieves optimal ventilation and oxygenation  Outcome: Progressing     Problem: Cardiovascular - Adult  Goal: Maintains optimal cardiac output and hemodynamic stability  Outcome: Progressing  Goal: Absence of cardiac dysrhythmias or at baseline  Outcome: Progressing     Problem: Metabolic/Fluid and Electrolytes - Adult  Goal: Electrolytes maintained within normal limits  Outcome: Progressing  Goal: Hemodynamic stability and optimal renal function maintained  Outcome: Progressing  Goal: Glucose maintained within prescribed range  Outcome: Progressing     Problem: Hematologic - Adult  Goal: Maintains hematologic stability  Outcome: Progressing     Problem: Skin/Tissue Integrity - Adult  Goal: Skin integrity remains intact  Outcome: Progressing  Goal: Incisions, wounds, or drain sites healing without S/S of infection  Outcome: Progressing     Problem: Musculoskeletal - Adult  Goal: Return mobility to safest level of function  Outcome: Progressing  Goal: Maintain proper alignment of affected body part  Outcome: Progressing     Problem: Gastrointestinal - Adult  Goal: Minimal or absence of nausea and vomiting  Outcome: Progressing  Goal: Maintains or returns to baseline bowel function  Outcome: Progressing  Goal: Maintains adequate nutritional intake  Outcome: Progressing     Problem: Genitourinary - Adult  Goal: Absence of urinary retention  Outcome: Progressing  Goal: Urinary catheter remains

## 2025-06-01 NOTE — PLAN OF CARE
Problem: Respiratory - Adult  Goal: Achieves optimal ventilation and oxygenation  6/1/2025 0839 by Ashwini Childs, RN  Note: Monitor sao2 breathe sounds wean O2 TO 6LN.C  6/1/2025 0228 by Collette Albrecht RN  Outcome: Progressing  6/1/2025 0155 by Collette Albrecht, RN  Outcome: Progressing     Problem: Cardiovascular - Adult  Goal: Maintains optimal cardiac output and hemodynamic stability  6/1/2025 0839 by Ashwini Childs RN  Note: Monitor vital signs adm meds  6/1/2025 0228 by Collette Albrecht, RN  Outcome: Progressing  6/1/2025 0155 by Collette Albrecht, RN  Outcome: Progressing

## 2025-06-01 NOTE — PLAN OF CARE
Problem: Chronic Conditions and Co-morbidities  Goal: Patient's chronic conditions and co-morbidity symptoms are monitored and maintained or improved  6/1/2025 0228 by Collette Albrecht RN  Outcome: Progressing  6/1/2025 0155 by Collette Albrecht RN  Outcome: Progressing     Problem: Discharge Planning  Goal: Discharge to home or other facility with appropriate resources  6/1/2025 0228 by Collette Albrecht RN  Outcome: Progressing  6/1/2025 0155 by Collette Albrecht RN  Outcome: Progressing     Problem: Pain  Goal: Verbalizes/displays adequate comfort level or baseline comfort level  6/1/2025 0228 by Collette Albrecht RN  Outcome: Progressing  6/1/2025 0155 by Collette Albrecht RN  Outcome: Progressing     Problem: Safety - Adult  Goal: Free from fall injury  6/1/2025 0228 by Collette Albrecht RN  Outcome: Progressing  6/1/2025 0155 by Collette Albrecht RN  Outcome: Progressing     Problem: Respiratory - Adult  Goal: Achieves optimal ventilation and oxygenation  6/1/2025 0228 by Collette Albrecht RN  Outcome: Progressing  6/1/2025 0155 by Collette Albrecht RN  Outcome: Progressing     Problem: Cardiovascular - Adult  Goal: Maintains optimal cardiac output and hemodynamic stability  6/1/2025 0228 by Collette Albrecht RN  Outcome: Progressing  6/1/2025 0155 by Collette Albrecht RN  Outcome: Progressing  Goal: Absence of cardiac dysrhythmias or at baseline  6/1/2025 0228 by Collette Albrecht RN  Outcome: Progressing  6/1/2025 0155 by Collette Albrecht RN  Outcome: Progressing     Problem: Metabolic/Fluid and Electrolytes - Adult  Goal: Electrolytes maintained within normal limits  6/1/2025 0228 by Collette Albrecht RN  Outcome: Progressing  6/1/2025 0155 by Collette Albrecht RN  Outcome: Progressing  Goal: Hemodynamic stability and optimal renal function maintained  6/1/2025 0228 by Collette Albrecht RN  Outcome: Progressing  6/1/2025 0155 by Collette Albrecht RN  Outcome: Progressing  Goal: Glucose maintained within prescribed range  6/1/2025 0228 by Collette Albrecht

## 2025-06-02 ENCOUNTER — APPOINTMENT (OUTPATIENT)
Dept: GENERAL RADIOLOGY | Age: 65
DRG: 166 | End: 2025-06-02
Attending: THORACIC SURGERY (CARDIOTHORACIC VASCULAR SURGERY)
Payer: MEDICAID

## 2025-06-02 LAB
ALBUMIN SERPL-MCNC: 3.6 G/DL (ref 3.5–5.2)
ALP SERPL-CCNC: 68 U/L (ref 40–129)
ALT SERPL-CCNC: 13 U/L (ref 0–50)
ANION GAP SERPL CALCULATED.3IONS-SCNC: 7 MMOL/L (ref 7–16)
AST SERPL-CCNC: 21 U/L (ref 0–50)
BASOPHILS # BLD: 0.03 K/UL (ref 0–0.2)
BASOPHILS NFR BLD: 0 % (ref 0–2)
BILIRUB SERPL-MCNC: 0.7 MG/DL (ref 0–1.2)
BUN SERPL-MCNC: 22 MG/DL (ref 8–23)
CALCIUM SERPL-MCNC: 9.2 MG/DL (ref 8.8–10.2)
CHLORIDE SERPL-SCNC: 101 MMOL/L (ref 98–107)
CO2 SERPL-SCNC: 30 MMOL/L (ref 22–29)
CREAT SERPL-MCNC: 0.8 MG/DL (ref 0.7–1.2)
EOSINOPHIL # BLD: 0.09 K/UL (ref 0.05–0.5)
EOSINOPHILS RELATIVE PERCENT: 1 % (ref 0–6)
ERYTHROCYTE [DISTWIDTH] IN BLOOD BY AUTOMATED COUNT: 13.4 % (ref 11.5–15)
GFR, ESTIMATED: >90 ML/MIN/1.73M2
GLUCOSE BLD-MCNC: 153 MG/DL (ref 74–99)
GLUCOSE BLD-MCNC: 172 MG/DL (ref 74–99)
GLUCOSE BLD-MCNC: 219 MG/DL (ref 74–99)
GLUCOSE BLD-MCNC: 257 MG/DL (ref 74–99)
GLUCOSE SERPL-MCNC: 191 MG/DL (ref 74–99)
HCT VFR BLD AUTO: 33.7 % (ref 37–54)
HGB BLD-MCNC: 10.8 G/DL (ref 12.5–16.5)
IMM GRANULOCYTES # BLD AUTO: 0.05 K/UL (ref 0–0.58)
IMM GRANULOCYTES NFR BLD: 0 % (ref 0–5)
LYMPHOCYTES NFR BLD: 1.16 K/UL (ref 1.5–4)
LYMPHOCYTES RELATIVE PERCENT: 10 % (ref 20–42)
MAGNESIUM SERPL-MCNC: 1.9 MG/DL (ref 1.6–2.4)
MCH RBC QN AUTO: 29.3 PG (ref 26–35)
MCHC RBC AUTO-ENTMCNC: 32 G/DL (ref 32–34.5)
MCV RBC AUTO: 91.3 FL (ref 80–99.9)
MONOCYTES NFR BLD: 1.44 K/UL (ref 0.1–0.95)
MONOCYTES NFR BLD: 13 % (ref 2–12)
NEUTROPHILS NFR BLD: 75 % (ref 43–80)
NEUTS SEG NFR BLD: 8.35 K/UL (ref 1.8–7.3)
PLATELET # BLD AUTO: 185 K/UL (ref 130–450)
PMV BLD AUTO: 10.7 FL (ref 7–12)
POTASSIUM SERPL-SCNC: 4.4 MMOL/L (ref 3.5–5.1)
PROT SERPL-MCNC: 5.9 G/DL (ref 6.4–8.3)
RBC # BLD AUTO: 3.69 M/UL (ref 3.8–5.8)
SODIUM SERPL-SCNC: 138 MMOL/L (ref 136–145)
WBC OTHER # BLD: 11.1 K/UL (ref 4.5–11.5)

## 2025-06-02 PROCEDURE — 6370000000 HC RX 637 (ALT 250 FOR IP): Performed by: PHYSICIAN ASSISTANT

## 2025-06-02 PROCEDURE — 97530 THERAPEUTIC ACTIVITIES: CPT

## 2025-06-02 PROCEDURE — 83735 ASSAY OF MAGNESIUM: CPT

## 2025-06-02 PROCEDURE — 94669 MECHANICAL CHEST WALL OSCILL: CPT

## 2025-06-02 PROCEDURE — 97165 OT EVAL LOW COMPLEX 30 MIN: CPT

## 2025-06-02 PROCEDURE — 85025 COMPLETE CBC W/AUTO DIFF WBC: CPT

## 2025-06-02 PROCEDURE — 97161 PT EVAL LOW COMPLEX 20 MIN: CPT

## 2025-06-02 PROCEDURE — 6370000000 HC RX 637 (ALT 250 FOR IP)

## 2025-06-02 PROCEDURE — 82962 GLUCOSE BLOOD TEST: CPT

## 2025-06-02 PROCEDURE — 94640 AIRWAY INHALATION TREATMENT: CPT

## 2025-06-02 PROCEDURE — 80053 COMPREHEN METABOLIC PANEL: CPT

## 2025-06-02 PROCEDURE — 2140000000 HC CCU INTERMEDIATE R&B

## 2025-06-02 PROCEDURE — 6360000002 HC RX W HCPCS: Performed by: PHYSICIAN ASSISTANT

## 2025-06-02 PROCEDURE — 93798 PHYS/QHP OP CAR RHAB W/ECG: CPT

## 2025-06-02 PROCEDURE — 6360000002 HC RX W HCPCS

## 2025-06-02 PROCEDURE — 2700000000 HC OXYGEN THERAPY PER DAY

## 2025-06-02 PROCEDURE — 71045 X-RAY EXAM CHEST 1 VIEW: CPT

## 2025-06-02 PROCEDURE — 97535 SELF CARE MNGMENT TRAINING: CPT

## 2025-06-02 RX ORDER — FUROSEMIDE 10 MG/ML
30 INJECTION INTRAMUSCULAR; INTRAVENOUS ONCE
Status: COMPLETED | OUTPATIENT
Start: 2025-06-02 | End: 2025-06-02

## 2025-06-02 RX ORDER — METOPROLOL SUCCINATE 25 MG/1
12.5 TABLET, EXTENDED RELEASE ORAL DAILY
Status: DISCONTINUED | OUTPATIENT
Start: 2025-06-02 | End: 2025-06-02

## 2025-06-02 RX ORDER — METOCLOPRAMIDE HYDROCHLORIDE 5 MG/ML
5 INJECTION INTRAMUSCULAR; INTRAVENOUS EVERY 6 HOURS
Status: DISCONTINUED | OUTPATIENT
Start: 2025-06-02 | End: 2025-06-03

## 2025-06-02 RX ORDER — SIMETHICONE 40MG/0.6ML
40 SUSPENSION, DROPS(FINAL DOSAGE FORM)(ML) ORAL 3 TIMES DAILY
Status: DISCONTINUED | OUTPATIENT
Start: 2025-06-02 | End: 2025-06-02 | Stop reason: ALTCHOICE

## 2025-06-02 RX ORDER — SIMETHICONE 80 MG
40 TABLET,CHEWABLE ORAL 3 TIMES DAILY
Status: DISCONTINUED | OUTPATIENT
Start: 2025-06-02 | End: 2025-06-03

## 2025-06-02 RX ORDER — CARVEDILOL 3.12 MG/1
3.12 TABLET ORAL 2 TIMES DAILY WITH MEALS
Status: DISCONTINUED | OUTPATIENT
Start: 2025-06-02 | End: 2025-06-05 | Stop reason: HOSPADM

## 2025-06-02 RX ADMIN — ATORVASTATIN CALCIUM 40 MG: 40 TABLET, FILM COATED ORAL at 20:29

## 2025-06-02 RX ADMIN — MUPIROCIN: 20 OINTMENT TOPICAL at 08:02

## 2025-06-02 RX ADMIN — POLYETHYLENE GLYCOL 3350 17 G: 17 POWDER, FOR SOLUTION ORAL at 20:28

## 2025-06-02 RX ADMIN — ONDANSETRON 4 MG: 2 INJECTION, SOLUTION INTRAMUSCULAR; INTRAVENOUS at 02:49

## 2025-06-02 RX ADMIN — FERROUS SULFATE TAB 325 MG (65 MG ELEMENTAL FE) 325 MG: 325 (65 FE) TAB at 17:12

## 2025-06-02 RX ADMIN — PANTOPRAZOLE SODIUM 40 MG: 40 TABLET, DELAYED RELEASE ORAL at 06:48

## 2025-06-02 RX ADMIN — METOCLOPRAMIDE 5 MG: 5 INJECTION, SOLUTION INTRAMUSCULAR; INTRAVENOUS at 09:50

## 2025-06-02 RX ADMIN — Medication 400 MG: at 20:29

## 2025-06-02 RX ADMIN — FUROSEMIDE 30 MG: 10 INJECTION, SOLUTION INTRAMUSCULAR; INTRAVENOUS at 12:17

## 2025-06-02 RX ADMIN — IPRATROPIUM BROMIDE AND ALBUTEROL SULFATE 1 DOSE: 2.5; .5 SOLUTION RESPIRATORY (INHALATION) at 12:16

## 2025-06-02 RX ADMIN — AMIODARONE HYDROCHLORIDE 400 MG: 200 TABLET ORAL at 08:05

## 2025-06-02 RX ADMIN — INSULIN LISPRO 8 UNITS: 100 INJECTION, SOLUTION INTRAVENOUS; SUBCUTANEOUS at 20:27

## 2025-06-02 RX ADMIN — SIMETHICONE 40 MG: 80 TABLET, CHEWABLE ORAL at 17:12

## 2025-06-02 RX ADMIN — ACETAMINOPHEN 650 MG: 325 TABLET ORAL at 08:06

## 2025-06-02 RX ADMIN — CARVEDILOL 3.12 MG: 3.12 TABLET, FILM COATED ORAL at 17:12

## 2025-06-02 RX ADMIN — METOCLOPRAMIDE 5 MG: 5 INJECTION, SOLUTION INTRAMUSCULAR; INTRAVENOUS at 17:12

## 2025-06-02 RX ADMIN — MIDODRINE HYDROCHLORIDE 10 MG: 10 TABLET ORAL at 12:17

## 2025-06-02 RX ADMIN — SENNOSIDES AND DOCUSATE SODIUM 1 TABLET: 50; 8.6 TABLET ORAL at 20:29

## 2025-06-02 RX ADMIN — MUPIROCIN: 20 OINTMENT TOPICAL at 20:27

## 2025-06-02 RX ADMIN — Medication 400 MG: at 08:05

## 2025-06-02 RX ADMIN — POLYETHYLENE GLYCOL 3350 17 G: 17 POWDER, FOR SOLUTION ORAL at 08:02

## 2025-06-02 RX ADMIN — ENOXAPARIN SODIUM 30 MG: 100 INJECTION SUBCUTANEOUS at 08:02

## 2025-06-02 RX ADMIN — FERROUS SULFATE TAB 325 MG (65 MG ELEMENTAL FE) 325 MG: 325 (65 FE) TAB at 08:06

## 2025-06-02 RX ADMIN — INSULIN GLARGINE 14 UNITS: 100 INJECTION, SOLUTION SUBCUTANEOUS at 20:28

## 2025-06-02 RX ADMIN — OXYCODONE 5 MG: 5 TABLET ORAL at 02:59

## 2025-06-02 RX ADMIN — SIMETHICONE 40 MG: 80 TABLET, CHEWABLE ORAL at 09:51

## 2025-06-02 RX ADMIN — ENOXAPARIN SODIUM 30 MG: 100 INJECTION SUBCUTANEOUS at 20:27

## 2025-06-02 RX ADMIN — SIMETHICONE 40 MG: 80 TABLET, CHEWABLE ORAL at 20:29

## 2025-06-02 RX ADMIN — ISOSORBIDE DINITRATE 5 MG: 10 TABLET ORAL at 12:17

## 2025-06-02 RX ADMIN — SENNOSIDES AND DOCUSATE SODIUM 1 TABLET: 50; 8.6 TABLET ORAL at 08:05

## 2025-06-02 RX ADMIN — MIDODRINE HYDROCHLORIDE 10 MG: 10 TABLET ORAL at 17:12

## 2025-06-02 RX ADMIN — ASPIRIN 81 MG: 81 TABLET, COATED ORAL at 08:04

## 2025-06-02 RX ADMIN — AMIODARONE HYDROCHLORIDE 400 MG: 200 TABLET ORAL at 20:28

## 2025-06-02 RX ADMIN — MIDODRINE HYDROCHLORIDE 10 MG: 10 TABLET ORAL at 08:04

## 2025-06-02 RX ADMIN — ISOSORBIDE DINITRATE 5 MG: 10 TABLET ORAL at 08:07

## 2025-06-02 RX ADMIN — MAGNESIUM HYDROXIDE 30 ML: 400 SUSPENSION ORAL at 08:07

## 2025-06-02 RX ADMIN — ISOSORBIDE DINITRATE 5 MG: 10 TABLET ORAL at 20:29

## 2025-06-02 RX ADMIN — LACTULOSE 20 G: 20 SOLUTION ORAL at 08:07

## 2025-06-02 RX ADMIN — Medication 500 MG: at 08:05

## 2025-06-02 RX ADMIN — ACETAMINOPHEN 650 MG: 325 TABLET ORAL at 20:28

## 2025-06-02 RX ADMIN — Medication 500 MG: at 20:29

## 2025-06-02 RX ADMIN — METOPROLOL SUCCINATE 12.5 MG: 25 TABLET, EXTENDED RELEASE ORAL at 08:06

## 2025-06-02 RX ADMIN — METOCLOPRAMIDE 5 MG: 5 INJECTION, SOLUTION INTRAMUSCULAR; INTRAVENOUS at 20:27

## 2025-06-02 RX ADMIN — BISACODYL 5 MG: 5 TABLET, COATED ORAL at 08:06

## 2025-06-02 RX ADMIN — INSULIN LISPRO 4 UNITS: 100 INJECTION, SOLUTION INTRAVENOUS; SUBCUTANEOUS at 12:17

## 2025-06-02 RX ADMIN — FOLIC ACID 1 MG: 1 TABLET ORAL at 08:06

## 2025-06-02 RX ADMIN — MAGNESIUM SULFATE HEPTAHYDRATE 2000 MG: 40 INJECTION, SOLUTION INTRAVENOUS at 08:17

## 2025-06-02 ASSESSMENT — PAIN DESCRIPTION - PAIN TYPE
TYPE: SURGICAL PAIN
TYPE: SURGICAL PAIN

## 2025-06-02 ASSESSMENT — PAIN DESCRIPTION - ONSET
ONSET: GRADUAL
ONSET: GRADUAL

## 2025-06-02 ASSESSMENT — PAIN SCALES - GENERAL
PAINLEVEL_OUTOF10: 3
PAINLEVEL_OUTOF10: 5
PAINLEVEL_OUTOF10: 0
PAINLEVEL_OUTOF10: 2
PAINLEVEL_OUTOF10: 0

## 2025-06-02 ASSESSMENT — PAIN DESCRIPTION - DESCRIPTORS
DESCRIPTORS: ACHING;DISCOMFORT;SORE
DESCRIPTORS: ACHING;DISCOMFORT;SORE;THROBBING
DESCRIPTORS: DISCOMFORT;ACHING;SORE

## 2025-06-02 ASSESSMENT — PAIN DESCRIPTION - FREQUENCY
FREQUENCY: INTERMITTENT
FREQUENCY: INTERMITTENT

## 2025-06-02 ASSESSMENT — PAIN - FUNCTIONAL ASSESSMENT
PAIN_FUNCTIONAL_ASSESSMENT: ACTIVITIES ARE NOT PREVENTED
PAIN_FUNCTIONAL_ASSESSMENT: PREVENTS OR INTERFERES SOME ACTIVE ACTIVITIES AND ADLS
PAIN_FUNCTIONAL_ASSESSMENT: ACTIVITIES ARE NOT PREVENTED

## 2025-06-02 ASSESSMENT — PAIN DESCRIPTION - ORIENTATION
ORIENTATION: MID
ORIENTATION: MID
ORIENTATION: LEFT;RIGHT

## 2025-06-02 ASSESSMENT — PAIN DESCRIPTION - LOCATION
LOCATION: CHEST
LOCATION: CHEST;ABDOMEN;INCISION
LOCATION: CHEST;INCISION

## 2025-06-02 NOTE — PROGRESS NOTES
Occupational Therapy  OCCUPATIONAL THERAPY INITIAL EVALUATION    [facility]  [address], [place]      Date:2025                                                Patient Name: Patrick Pickering  MRN: [id number]  : 1960  Room: [address] [place] [address]    Evaluating OT: Tin Arnett OTR/L #[id number]     Referring Provider: Robyn Blackwell PA   Specific Provider Orders/Date: OT eval and treat 25    Diagnosis: Coronary artery disease involving native coronary artery of native heart with other form of angina pectoris [I25.118]   Pt admitted to hospital for scheduled surgery     Surgery: CABG x2 25     Pertinent Medical History:  has a past medical history of CAD (coronary artery disease), DM (diabetes mellitus) ([facility]), HTN (hypertension), Hyperlipemia, and MI (myocardial infarction) ([facility]).       Precautions:  Fall Risk, KYMITT, O2    Assessment of current deficits    [x] Functional mobility  [x]ADLs  [x] Strength               []Cognition    [x] Functional transfers   [x] IADLs         [x] Safety Awareness   [x]Endurance    [] Fine Coordination              [x] Balance      [] Vision/perception   []Sensation     []Gross Motor Coordination  [] ROM  [] Delirium                   [] Motor Control     OT PLAN OF CARE   OT POC based on physician orders, patient diagnosis and results of clinical assessment    Frequency/Duration 1-5 days/wk for 2 weeks PRN   Specific OT Treatment Interventions to include:   * Instruction/training on adapted ADL techniques and AE recommendations to increase functional independence within precautions       * Training on energy conservation strategies, correct breathing pattern and techniques to improve independence/tolerance for self-care routine  * Functional transfer/mobility training/DME recommendations for increased independence, safety, and fall prevention  * Patient/Family education to increase follow through with safety  retraining: UB/LB self-care tasks (robe and socks), toileting tasks in bathroom and standing grooming tasks at sink while cuing on sequencing, modified techniques, posture, safety and energy conservation techniques.. Skilled monitoring of HR, O2 sats and pts response to treatment.        Rehab Potential:  Good  for established goals     Patient / Family Goal: Regain [place]     Patient and/or family were instructed on functional diagnosis, prognosis/goals and OT plan of care. Demonstrated fair understanding.     Eval Complexity: Low    Time In: 830  Time Out: 909  Total Treatment Time: 24 minutes    Min Units   OT Eval Low 97165  x  1   OT Eval Medium 11533      OT Eval High 39081      OT Re-Eval 18635       Therapeutic Ex 91337       Therapeutic Activities 45012  11  1   ADL/Self Care 88109  13  1   Orthotic Management 19322       Manual [id number]     Neuro Re-Ed 57630       Non-Billable Time          Evaluation Time additionally includes thorough review of current medical information, gathering information on past medical history/social history and prior level of function, interpretation of standardized testing/informal observation of tasks, assessment of data and development of plan of care and goals.          Tin Arnett OTR/L #[id number]

## 2025-06-02 NOTE — PROGRESS NOTES
POD#4 Awake, alert. No complaints. Denies CP, palpitations, SOB at rest, dizziness/lightheadedness.    Vitals:    06/01/25 2145 06/01/25 2345 06/02/25 0315 06/02/25 0635   BP: 110/73 108/70 120/64    Pulse: 96 94 96    Resp: 16 18 18    Temp: 98.2 °F (36.8 °C) 98.2 °F (36.8 °C) 97.6 °F (36.4 °C)    TempSrc: Temporal Temporal Temporal    SpO2: 95% 95% 95%    Weight:    98.1 kg (216 lb 3.2 oz)   Height:         O2: 4L/NC 95%      Intake/Output Summary (Last 24 hours) at 6/2/2025 0743  Last data filed at 6/2/2025 0710  Gross per 24 hour   Intake 720 ml   Output 2435 ml   Net -1715 ml         No post op BM yet      UO: 400mL/8hr     CT OUTPUT:   Mediastinal: 30mL/8hr (230mL/24hrs)   Left Pleural: 45mL/8hr (170mL/24hrs)   Right Pleural: 15mL/8hr (55mL/24hrs)      Recent Labs     05/31/25  0403 06/01/25  0412 06/02/25  0645   WBC 18.7* 14.0* 11.1   HGB 9.5* 10.4* 10.8*   HCT 30.2* 33.7* 33.7*    151 185      Recent Labs     05/31/25  0403 06/01/25  0412 06/02/25  0645   BUN 36* 28* 22   CREATININE 1.0 0.8 0.8         Telemetry: SR      PE  Cardiac: RRR  Lungs: decreased bases  Chest incision with intact TELLY DSD.  Sternum stable. Prior chest tube site incisions C/D/I, no erythema with intact sutures. Chest tubes x 3 and Epicardial pacing wires present and secure.   Abd: Soft, nontender, +BS, mildly distended  Ext: Incisions C/D/I, approximated, no erythema, + LE edema, left radial artery harvest site C/D/I, approximated, no erythema, good , minor numbness noted to ring finger           A/P: POD#4    1. CAD  --Stable s/p Sternotomy/CABG x 2 (LIMA-LAD, Rad-OM)/Exploration RCA/ANGELA exclusion with 35mm AtriClip/Posterior pericardotomy/EVH RLE/Open LUE radial harvest/Rigid internal fixation of the sternum with Rafael plates x 2 on 5/29/2025  --Post op ANDRESSA reveals  60% EF on inotropic support   --will order TTE day before or day of discharge to establish baseline and potential need for WCD on discharge -- order  no result by then   --advise to chew gum for at least 15 minutes TID   --Encouraged continued increase in oral intake and activity.         8. Expected deconditioning in the setting following surgery  --Increase activity as tolerated  --PT/OT        9. Hx DM Type 2/Stress induced hyperglycemia  --HgbA1C 8.9  --home rx: moujaro, amaryl, lantus   --continue SSI/nightly lantus; monitor blood glucose levels closely and adjust insulin as needed -- may need to add prandial humalog once eating regular diet         10. HFrEF  --pre-op EF 40%  --post-op EF 60% on inotropic support  --add GDMT as able: currently on toprol, intermittent diuresis   --repeat TTE prior to dc to evaluate lv function and possible need for WCD--ordered placed to be completed tomorrow   --consult chf clinic for outpatient establishment; order placed 6/2          DVT prophylaxis:  --continue bilateral knee high FEROZ hose  --continue PCDs  --continue progressive ambulation  --lovenox for dvt prophylaxis and continue knee high FEROZ hose/pcds/progressive ambulation      Dispo: home vs. Rehab pending progression in activity level        This patient's case and care plan was discussed with the attending surgeon

## 2025-06-02 NOTE — PLAN OF CARE
Problem: Chronic Conditions and Co-morbidities  Goal: Patient's chronic conditions and co-morbidity symptoms are monitored and maintained or improved  6/2/2025 1142 by Imelda Delaney RN  Outcome: Progressing     Problem: Discharge Planning  Goal: Discharge to home or other facility with appropriate resources  6/2/2025 1142 by Imelda Delaney RN  Outcome: Progressing     Problem: Pain  Goal: Verbalizes/displays adequate comfort level or baseline comfort level  6/2/2025 1142 by Imelda Delaney RN  Outcome: Progressing     Problem: Safety - Adult  Goal: Free from fall injury  6/2/2025 1142 by Imelda Delaney RN  Outcome: Progressing     Problem: Respiratory - Adult  Goal: Achieves optimal ventilation and oxygenation  6/2/2025 1142 by Imelda Delaney RN  Outcome: Progressing     Problem: Cardiovascular - Adult  Goal: Maintains optimal cardiac output and hemodynamic stability  6/2/2025 1142 by Imelda Delaney RN  Outcome: Progressing

## 2025-06-02 NOTE — PROGRESS NOTES
RN called [facility] regarding patient Virtual  order. No telesitters available at this time    Imelda Delaney RN

## 2025-06-02 NOTE — PLAN OF CARE
Problem: Chronic Conditions and Co-morbidities  Goal: Patient's chronic conditions and co-morbidity symptoms are monitored and maintained or improved  Outcome: Progressing     Problem: Discharge Planning  Goal: Discharge to home or other facility with appropriate resources  Outcome: Progressing     Problem: Pain  Goal: Verbalizes/displays adequate comfort level or baseline comfort level  Outcome: Progressing     Problem: Safety - Adult  Goal: Free from fall injury  Outcome: Progressing     Problem: Respiratory - Adult  Goal: Achieves optimal ventilation and oxygenation  Outcome: Progressing     Problem: Cardiovascular - Adult  Goal: Maintains optimal cardiac output and hemodynamic stability  Outcome: Progressing     Problem: Metabolic/Fluid and Electrolytes - Adult  Goal: Electrolytes maintained within normal limits  Outcome: Progressing

## 2025-06-02 NOTE — PROGRESS NOTES
4 Eyes Skin Assessment     NAME:  Patrick Pickering  YOB: 1960  MEDICAL RECORD NUMBER:  [id number]    The patient is being assessed for  Admission    I agree that at least one RN has performed a thorough Head to Toe Skin Assessment on the patient. ALL assessment sites listed below have been assessed.      Areas assessed by both nurses:    Head, Face, Ears, Shoulders, Back, Chest, Arms, Elbows, Hands, Sacrum. Buttock, Coccyx, Ischium, Legs. Feet and Heels, and Under Medical Devices         Does the Patient have a Wound? No noted wound(s)       Faustino Prevention initiated by RN: No  Wound Care Orders initiated by RN: No    Pressure Injury (Stage 3,4, Unstageable, DTI, NWPT, and Complex wounds) if present, place Wound referral order by RN under : No    New Ostomies, if present place, Ostomy referral order under : No     Nurse 1 eSignature: Electronically signed by Ivan Gentile RN on 6/1/25 at 10:27 PM EDT    **SHARE this note so that the co-signing nurse can place an eSignature**    Nurse 2 eSignature: Electronically signed by Marianela Carlson RN on 6/2/25 at 2:05 AM EDT

## 2025-06-02 NOTE — CARE COORDINATION
Transition of care update: POD#4 CABG x 2. Pleural CTs x 2 to be d/c'd. CT x 1 and wires cont. Echo ordered. Hgb 10.8 and other labs noted from today. Constipation - expected delayed return of bowel function. FLD. IV reglan 5mg q 6 hrs. Mylicon chewable tablet 40mg three times daily. Iv lasix 30mg x 1. O2 at 4l/nc. Met with pt in room. Pt ambulated 100ft with cardiac rehab this am. Plan is to return home when medically ready. Pt lives alone and will have family or friends stay with him 24/7. [facility] is setup. Confirmed with Jacqueline with [facility] they have accepted pt. No other needs were voiced at present. Cm/sw will follow.

## 2025-06-02 NOTE — PROGRESS NOTES
Physical Therapy Initial Evaluation    Name: Patrick Pickering  : 1960  MRN: [id number]      Date of Service: 2025    Evaluating PT:  Moises Kent, PT [id number]    Referring provider/PT Order:  PT Eval and Treat   25  PT eval and treat  Start:  25,   End:  25,   ONE TIME,   Standing Count:  1 Occurrences,   R         Robyn Blackwell PA     Room #:  6521/6521-A  Diagnosis:  Coronary artery disease involving native coronary artery of native heart with other form of angina pectoris [I25.118]  PMHx/PSHx:     has a past medical history of CAD (coronary artery disease), DM (diabetes mellitus) ([facility]), HTN (hypertension), Hyperlipemia, and MI (myocardial infarction) ([facility]).    has a past surgical history that includes knee surgery (); Coronary angioplasty with stent (10/27/2018); Cardiac procedure (N/A, 2025); and Coronary artery bypass graft (Left, 2025).     Procedure/Surgery:  25 s/p Sternotomy/CABG x 2   Precautions:  Falls, O2,  Sternotomy and KYMITT  Equipment Needs: None at this time,    SUBJECTIVE:    Patient lives alone  in a ranch home  with 2 steps to enter. Bed is on 1 floor and bath is on 1 floor.  Patient ambulated independently  PTA. Equipment owned: None,      OBJECTIVE:   Initial Evaluation  Date: 25 Treatment Short Term/ Long Term   Goals   AM-PAC 6 Clicks  15/24     Was pt agreeable to Eval/treatment? Yes      Does pt have pain? Yes reporting abdominal discomfort.      Bed Mobility  Rolling: Min  Supine to sit:   NT   Sit to supine: Min   Scooting: Min   Rolling: Ind  Supine to sit: Ind  Sit to supine: Ind  Scooting: Ind   Transfers Sit to stand: Mod  low surface.  Stand to sit: Mod   Stand pivot: Min with Wheeled Walker  Sit to stand: Mod Ind  to Wheeled Walker  Stand to sit: Mod Ind    Stand pivot: Mod Ind  with Wheeled Walker   Ambulation    100 feet with Wheeled Walker   Min cues to maintain arms by his side.   400feet with fww if required  CTS with  call light and phone within reach,  all lines and tubes intact, nursing notified.   This patient can benefit from the continuation of skilled PT during this admission to maximize functional level and return to PLOF.     Treatment:  Patient completed and was instructed in the following treatment:      *Bed mobility training - pt given verbal and tactile cues to facilitate proper sequencing and safety during rolling and supine>sit as well as provided with physical assistance to complete task  *STS and transfer training - educated on hand/foot placement, safety, and sequencing during STS and pivot transfers using assistive device  *Gait training - verbal cues for Wheeled Walker approximation/negotiation, upright posture, and safety during 90 and 180 degree turns during gait   *Patient Education- regarding use of call light for safety, KYMITT, and walker safety sequencing.  *Skillful positioning in bed to protect skin/joint integrity.  *Vitals and symptoms were closely monitored throughout session.    Pt's/ family goals      Return Home    Prognosis is good  for reaching above PT goals.    Patient and or family understand(s) diagnosis, prognosis, and plan of care.  yes    PHYSICAL THERAPY PLAN OF CARE:    PT POC is established based on physician order and patient diagnosis     Diagnosis:  Coronary artery disease involving native coronary artery of native heart with other form of angina pectoris [I25.118]  Specific instructions for next treatment:  Increase ambulation distance, Stair negotiation training, and Review KYMITT  Current Treatment Recommendations:     [x] Strengthening to improve independence with functional mobility   [x] ROM to improve independence with functional mobility   [x] Balance Training to improve static/dynamic balance and to reduce fall risk  [x] Endurance Training to improve activity tolerance during functional mobility   [x] Transfer Training to improve safety and independence with all

## 2025-06-02 NOTE — CONSULTS
[facility]   Inpatient CHF Nurse Navigator Consult        Cardiologist: Dr. Flowers  Primary Care Physician: Riri Bradford DO     Patrick Pickering is a 64 y.o. (1960) male with a history of HFmrEF, most recent EF: on 6/2/2025 45-50%  Lab Results   Component Value Date    LVEF 40 10/27/2018       Patient was awake and alert, laying in bed during the consultation and is agreeable to heart failure education. He was engaged and asked appropriate questions throughout the education session.     Barriers identified during consult contributing to HF Hospitalization: overwhelmed by complexity of regimen and stress.     [] Limited medication adherence   [] Poor health literacy, education regarding HF medications provided   [] Pill box provided to patient  [] Difficulty affording medications  [] Difficulty obtaining/ managing medications  [] Prescription assistance information given     [] Not weighing themselves daily  [x] Weight log provided for easy monitoring  [] Scale provided     [] Not following low sodium diet  [] Food insecurity   [x] 2 gram sodium diet education provided   [] Low sodium recipes provided  [] Sodium free seasoning provided   [] Low sodium meal delivery options given to patient  [] Dietician consulted     [] Lack of transportation to appointments     [] Depression, given chronic illness  [] Primary team notified     [] Goals of care need addressed  [] Palliative care consulted     [] CHF community health worker Elsi Berg consulted [phone], to assist with         Chart Reviewed:  Diet: ADULT ORAL NUTRITION SUPPLEMENT; Breakfast, Lunch, Dinner; Diabetic Oral Supplement  ADULT ORAL NUTRITION SUPPLEMENT; Breakfast, Dinner; Wound Healing Oral Supplement  ADULT DIET; Regular; 4 carb choices (60 gm/meal); Low Fat/Low Chol/High Fiber/2 gm Na; No Added Salt (3-4 gm); High Fiber   Daily Weights: Patient Vitals for the past 96 hrs (Last 3 readings):   Weight

## 2025-06-02 NOTE — NURSE NAVIGATOR
Patient's chart updated to reflect:      .    - HF care plan, HF education points and HF discharge instructions.  -Orders: 2 gram sodium diet, daily weights, I/O.  -PCP or cardiology follow up appointments to be scheduled within 7 days of hospital discharge.  -CHF education session will be provided to the patient prior to hospital discharge.     Belkis Shetty RN, CHFN   Heart Failure Navigator

## 2025-06-03 ENCOUNTER — APPOINTMENT (OUTPATIENT)
Age: 65
DRG: 166 | End: 2025-06-03
Attending: THORACIC SURGERY (CARDIOTHORACIC VASCULAR SURGERY)
Payer: MEDICAID

## 2025-06-03 LAB
ALBUMIN SERPL-MCNC: 3.6 G/DL (ref 3.5–5.2)
ALP SERPL-CCNC: 94 U/L (ref 40–129)
ALT SERPL-CCNC: 13 U/L (ref 0–50)
ANION GAP SERPL CALCULATED.3IONS-SCNC: 10 MMOL/L (ref 7–16)
AST SERPL-CCNC: 21 U/L (ref 0–50)
BASOPHILS # BLD: 0.04 K/UL (ref 0–0.2)
BASOPHILS NFR BLD: 0 % (ref 0–2)
BILIRUB DIRECT SERPL-MCNC: 0.3 MG/DL (ref 0–0.2)
BILIRUB SERPL-MCNC: 0.8 MG/DL (ref 0–1.2)
BUN SERPL-MCNC: 21 MG/DL (ref 8–23)
CALCIUM SERPL-MCNC: 9.1 MG/DL (ref 8.8–10.2)
CHLORIDE SERPL-SCNC: 99 MMOL/L (ref 98–107)
CO2 SERPL-SCNC: 32 MMOL/L (ref 22–29)
CREAT SERPL-MCNC: 0.8 MG/DL (ref 0.7–1.2)
ECHO AO ASC DIAM: 3 CM
ECHO AO ASCENDING AORTA INDEX: 1.37 CM/M2
ECHO AV AREA PEAK VELOCITY: 2.5 CM2
ECHO AV AREA VTI: 2.8 CM2
ECHO AV AREA/BSA PEAK VELOCITY: 1.1 CM2/M2
ECHO AV AREA/BSA VTI: 1.3 CM2/M2
ECHO AV CUSP MM: 1.9 CM
ECHO AV MEAN GRADIENT: 3 MMHG
ECHO AV MEAN VELOCITY: 0.9 M/S
ECHO AV PEAK GRADIENT: 6 MMHG
ECHO AV PEAK VELOCITY: 1.2 M/S
ECHO AV VELOCITY RATIO: 0.83
ECHO AV VTI: 19 CM
ECHO BSA: 2.18 M2
ECHO EST RA PRESSURE: 3 MMHG
ECHO LA VOL A-L A2C: 35 ML (ref 18–58)
ECHO LA VOL A-L A4C: 48 ML (ref 18–58)
ECHO LA VOL BP: 37 ML (ref 18–58)
ECHO LA VOL MOD A2C: 33 ML (ref 18–58)
ECHO LA VOL MOD A4C: 45 ML (ref 18–58)
ECHO LA VOL/BSA BIPLANE: 17 ML/M2 (ref 16–34)
ECHO LA VOLUME AREA LENGTH: 42 ML
ECHO LA VOLUME INDEX A-L A2C: 16 ML/M2 (ref 16–34)
ECHO LA VOLUME INDEX A-L A4C: 22 ML/M2 (ref 16–34)
ECHO LA VOLUME INDEX AREA LENGTH: 19 ML/M2 (ref 16–34)
ECHO LA VOLUME INDEX MOD A2C: 15 ML/M2 (ref 16–34)
ECHO LA VOLUME INDEX MOD A4C: 21 ML/M2 (ref 16–34)
ECHO LV EDV A2C: 88 ML
ECHO LV EDV A4C: 45 ML
ECHO LV EDV BP: 65 ML (ref 67–155)
ECHO LV EDV INDEX A4C: 21 ML/M2
ECHO LV EDV INDEX BP: 30 ML/M2
ECHO LV EDV NDEX A2C: 40 ML/M2
ECHO LV EF PHYSICIAN: 50 %
ECHO LV EJECTION FRACTION A2C: 54 %
ECHO LV EJECTION FRACTION A4C: 43 %
ECHO LV ESV A2C: 41 ML
ECHO LV ESV A4C: 25 ML
ECHO LV ESV BP: 34 ML (ref 22–58)
ECHO LV ESV INDEX A2C: 19 ML/M2
ECHO LV ESV INDEX A4C: 11 ML/M2
ECHO LV ESV INDEX BP: 16 ML/M2
ECHO LVOT AREA: 3.1 CM2
ECHO LVOT AV VTI INDEX: 0.88
ECHO LVOT DIAM: 2 CM
ECHO LVOT MEAN GRADIENT: 2 MMHG
ECHO LVOT PEAK GRADIENT: 4 MMHG
ECHO LVOT PEAK VELOCITY: 1 M/S
ECHO LVOT STROKE VOLUME INDEX: 24.1 ML/M2
ECHO LVOT SV: 52.8 ML
ECHO LVOT VTI: 16.8 CM
ECHO MV "A" WAVE DURATION: 117 MSEC
ECHO MV A VELOCITY: 0.64 M/S
ECHO MV AREA PHT: 5.7 CM2
ECHO MV AREA VTI: 2.6 CM2
ECHO MV E DECELERATION TIME (DT): 194.4 MS
ECHO MV E VELOCITY: 0.57 M/S
ECHO MV E/A RATIO: 0.89
ECHO MV LVOT VTI INDEX: 1.2
ECHO MV MAX VELOCITY: 0.7 M/S
ECHO MV MEAN GRADIENT: 1 MMHG
ECHO MV MEAN VELOCITY: 0.5 M/S
ECHO MV PEAK GRADIENT: 2 MMHG
ECHO MV PRESSURE HALF TIME (PHT): 38.8 MS
ECHO MV VTI: 20.1 CM
ECHO PV MAX VELOCITY: 1.1 M/S
ECHO PV MEAN GRADIENT: 2 MMHG
ECHO PV MEAN VELOCITY: 0.7 M/S
ECHO PV PEAK GRADIENT: 5 MMHG
ECHO PV VTI: 15.5 CM
EOSINOPHIL # BLD: 0.15 K/UL (ref 0.05–0.5)
EOSINOPHILS RELATIVE PERCENT: 2 % (ref 0–6)
ERYTHROCYTE [DISTWIDTH] IN BLOOD BY AUTOMATED COUNT: 13.2 % (ref 11.5–15)
GFR, ESTIMATED: >90 ML/MIN/1.73M2
GLUCOSE BLD-MCNC: 199 MG/DL (ref 74–99)
GLUCOSE BLD-MCNC: 203 MG/DL (ref 74–99)
GLUCOSE BLD-MCNC: 279 MG/DL (ref 74–99)
GLUCOSE BLD-MCNC: 89 MG/DL (ref 74–99)
GLUCOSE SERPL-MCNC: 108 MG/DL (ref 74–99)
HCT VFR BLD AUTO: 33.8 % (ref 37–54)
HGB BLD-MCNC: 10.8 G/DL (ref 12.5–16.5)
IMM GRANULOCYTES # BLD AUTO: 0.08 K/UL (ref 0–0.58)
IMM GRANULOCYTES NFR BLD: 1 % (ref 0–5)
LEFT VENTRICULAR EJECTION FRACTION HIGH VALUE: 55 %
LEFT VENTRICULAR EJECTION FRACTION MODE: NORMAL
LV EF: 50 %
LYMPHOCYTES NFR BLD: 1.05 K/UL (ref 1.5–4)
LYMPHOCYTES RELATIVE PERCENT: 11 % (ref 20–42)
MAGNESIUM SERPL-MCNC: 2.2 MG/DL (ref 1.6–2.4)
MCH RBC QN AUTO: 29.1 PG (ref 26–35)
MCHC RBC AUTO-ENTMCNC: 32 G/DL (ref 32–34.5)
MCV RBC AUTO: 91.1 FL (ref 80–99.9)
MONOCYTES NFR BLD: 1.56 K/UL (ref 0.1–0.95)
MONOCYTES NFR BLD: 16 % (ref 2–12)
NEUTROPHILS NFR BLD: 70 % (ref 43–80)
NEUTS SEG NFR BLD: 6.62 K/UL (ref 1.8–7.3)
PLATELET # BLD AUTO: 229 K/UL (ref 130–450)
PMV BLD AUTO: 10 FL (ref 7–12)
POTASSIUM SERPL-SCNC: 4.1 MMOL/L (ref 3.5–5.1)
PROT SERPL-MCNC: 6 G/DL (ref 6.4–8.3)
RBC # BLD AUTO: 3.71 M/UL (ref 3.8–5.8)
RBC # BLD: ABNORMAL 10*6/UL
SODIUM SERPL-SCNC: 141 MMOL/L (ref 136–145)
WBC OTHER # BLD: 9.5 K/UL (ref 4.5–11.5)

## 2025-06-03 PROCEDURE — 6370000000 HC RX 637 (ALT 250 FOR IP): Performed by: PHYSICIAN ASSISTANT

## 2025-06-03 PROCEDURE — 6360000002 HC RX W HCPCS

## 2025-06-03 PROCEDURE — 94669 MECHANICAL CHEST WALL OSCILL: CPT

## 2025-06-03 PROCEDURE — 6360000002 HC RX W HCPCS: Performed by: PHYSICIAN ASSISTANT

## 2025-06-03 PROCEDURE — 83735 ASSAY OF MAGNESIUM: CPT

## 2025-06-03 PROCEDURE — 94640 AIRWAY INHALATION TREATMENT: CPT

## 2025-06-03 PROCEDURE — 80053 COMPREHEN METABOLIC PANEL: CPT

## 2025-06-03 PROCEDURE — 85025 COMPLETE CBC W/AUTO DIFF WBC: CPT

## 2025-06-03 PROCEDURE — 93306 TTE W/DOPPLER COMPLETE: CPT | Performed by: INTERNAL MEDICINE

## 2025-06-03 PROCEDURE — 6370000000 HC RX 637 (ALT 250 FOR IP)

## 2025-06-03 PROCEDURE — 2700000000 HC OXYGEN THERAPY PER DAY

## 2025-06-03 PROCEDURE — 82962 GLUCOSE BLOOD TEST: CPT

## 2025-06-03 PROCEDURE — 82248 BILIRUBIN DIRECT: CPT

## 2025-06-03 PROCEDURE — 2140000000 HC CCU INTERMEDIATE R&B

## 2025-06-03 PROCEDURE — C8929 TTE W OR WO FOL WCON,DOPPLER: HCPCS

## 2025-06-03 PROCEDURE — 93798 PHYS/QHP OP CAR RHAB W/ECG: CPT

## 2025-06-03 PROCEDURE — 6360000004 HC RX CONTRAST MEDICATION

## 2025-06-03 RX ORDER — SIMETHICONE 80 MG
40 TABLET,CHEWABLE ORAL 4 TIMES DAILY PRN
Status: DISCONTINUED | OUTPATIENT
Start: 2025-06-03 | End: 2025-06-05 | Stop reason: HOSPADM

## 2025-06-03 RX ORDER — FUROSEMIDE 10 MG/ML
40 INJECTION INTRAMUSCULAR; INTRAVENOUS ONCE
Status: COMPLETED | OUTPATIENT
Start: 2025-06-03 | End: 2025-06-03

## 2025-06-03 RX ADMIN — SIMETHICONE 40 MG: 80 TABLET, CHEWABLE ORAL at 11:04

## 2025-06-03 RX ADMIN — ISOSORBIDE DINITRATE 5 MG: 10 TABLET ORAL at 21:31

## 2025-06-03 RX ADMIN — BISACODYL 5 MG: 5 TABLET, COATED ORAL at 08:34

## 2025-06-03 RX ADMIN — INSULIN LISPRO 4 UNITS: 100 INJECTION, SOLUTION INTRAVENOUS; SUBCUTANEOUS at 16:50

## 2025-06-03 RX ADMIN — ISOSORBIDE DINITRATE 5 MG: 10 TABLET ORAL at 15:13

## 2025-06-03 RX ADMIN — ASPIRIN 81 MG: 81 TABLET, COATED ORAL at 08:33

## 2025-06-03 RX ADMIN — PANTOPRAZOLE SODIUM 40 MG: 40 TABLET, DELAYED RELEASE ORAL at 05:51

## 2025-06-03 RX ADMIN — MIDODRINE HYDROCHLORIDE 10 MG: 10 TABLET ORAL at 15:13

## 2025-06-03 RX ADMIN — IPRATROPIUM BROMIDE AND ALBUTEROL SULFATE 1 DOSE: 2.5; .5 SOLUTION RESPIRATORY (INHALATION) at 09:36

## 2025-06-03 RX ADMIN — FERROUS SULFATE TAB 325 MG (65 MG ELEMENTAL FE) 325 MG: 325 (65 FE) TAB at 08:34

## 2025-06-03 RX ADMIN — AMIODARONE HYDROCHLORIDE 400 MG: 200 TABLET ORAL at 08:33

## 2025-06-03 RX ADMIN — INSULIN LISPRO 4 UNITS: 100 INJECTION, SOLUTION INTRAVENOUS; SUBCUTANEOUS at 11:05

## 2025-06-03 RX ADMIN — SULFUR HEXAFLUORIDE 2 ML: 60.7; .19; .19 INJECTION, POWDER, LYOPHILIZED, FOR SUSPENSION INTRAVENOUS; INTRAVESICAL at 08:32

## 2025-06-03 RX ADMIN — METOCLOPRAMIDE 5 MG: 5 INJECTION, SOLUTION INTRAMUSCULAR; INTRAVENOUS at 04:51

## 2025-06-03 RX ADMIN — FOLIC ACID 1 MG: 1 TABLET ORAL at 08:34

## 2025-06-03 RX ADMIN — IPRATROPIUM BROMIDE AND ALBUTEROL SULFATE 1 DOSE: 2.5; .5 SOLUTION RESPIRATORY (INHALATION) at 19:44

## 2025-06-03 RX ADMIN — INSULIN GLARGINE 14 UNITS: 100 INJECTION, SOLUTION SUBCUTANEOUS at 21:31

## 2025-06-03 RX ADMIN — IPRATROPIUM BROMIDE AND ALBUTEROL SULFATE 1 DOSE: 2.5; .5 SOLUTION RESPIRATORY (INHALATION) at 13:22

## 2025-06-03 RX ADMIN — FERROUS SULFATE TAB 325 MG (65 MG ELEMENTAL FE) 325 MG: 325 (65 FE) TAB at 15:13

## 2025-06-03 RX ADMIN — ACETAMINOPHEN 650 MG: 325 TABLET ORAL at 22:40

## 2025-06-03 RX ADMIN — Medication 500 MG: at 21:31

## 2025-06-03 RX ADMIN — FUROSEMIDE 40 MG: 10 INJECTION, SOLUTION INTRAMUSCULAR; INTRAVENOUS at 08:35

## 2025-06-03 RX ADMIN — ENOXAPARIN SODIUM 30 MG: 100 INJECTION SUBCUTANEOUS at 21:31

## 2025-06-03 RX ADMIN — MIDODRINE HYDROCHLORIDE 10 MG: 10 TABLET ORAL at 08:33

## 2025-06-03 RX ADMIN — MIDODRINE HYDROCHLORIDE 10 MG: 10 TABLET ORAL at 11:04

## 2025-06-03 RX ADMIN — ATORVASTATIN CALCIUM 40 MG: 40 TABLET, FILM COATED ORAL at 21:31

## 2025-06-03 RX ADMIN — CARVEDILOL 3.12 MG: 3.12 TABLET, FILM COATED ORAL at 15:13

## 2025-06-03 RX ADMIN — SENNOSIDES AND DOCUSATE SODIUM 1 TABLET: 50; 8.6 TABLET ORAL at 08:33

## 2025-06-03 RX ADMIN — AMIODARONE HYDROCHLORIDE 400 MG: 200 TABLET ORAL at 21:31

## 2025-06-03 RX ADMIN — IPRATROPIUM BROMIDE AND ALBUTEROL SULFATE 1 DOSE: 2.5; .5 SOLUTION RESPIRATORY (INHALATION) at 15:47

## 2025-06-03 RX ADMIN — CARVEDILOL 3.12 MG: 3.12 TABLET, FILM COATED ORAL at 08:34

## 2025-06-03 RX ADMIN — Medication 400 MG: at 08:34

## 2025-06-03 RX ADMIN — ENOXAPARIN SODIUM 30 MG: 100 INJECTION SUBCUTANEOUS at 08:35

## 2025-06-03 RX ADMIN — SENNOSIDES AND DOCUSATE SODIUM 1 TABLET: 50; 8.6 TABLET ORAL at 21:31

## 2025-06-03 RX ADMIN — Medication 500 MG: at 08:34

## 2025-06-03 RX ADMIN — ISOSORBIDE DINITRATE 5 MG: 10 TABLET ORAL at 08:34

## 2025-06-03 RX ADMIN — INSULIN LISPRO 8 UNITS: 100 INJECTION, SOLUTION INTRAVENOUS; SUBCUTANEOUS at 21:30

## 2025-06-03 RX ADMIN — Medication 400 MG: at 21:31

## 2025-06-03 ASSESSMENT — PAIN SCALES - GENERAL
PAINLEVEL_OUTOF10: 3
PAINLEVEL_OUTOF10: 0

## 2025-06-03 ASSESSMENT — PAIN DESCRIPTION - ORIENTATION: ORIENTATION: MID

## 2025-06-03 ASSESSMENT — PAIN DESCRIPTION - DESCRIPTORS: DESCRIPTORS: ACHING;DISCOMFORT;SORE

## 2025-06-03 ASSESSMENT — PAIN - FUNCTIONAL ASSESSMENT: PAIN_FUNCTIONAL_ASSESSMENT: ACTIVITIES ARE NOT PREVENTED

## 2025-06-03 ASSESSMENT — PAIN DESCRIPTION - LOCATION: LOCATION: CHEST;STERNUM

## 2025-06-03 NOTE — CARE COORDINATION
Transition of care update: POD#5 CABG x 2. CT x 1 removed. Wires cont. Echo pending. Labs noted. Iv lasix 40mg x 1. Regular, cardiac diet. Met with pt in room. Up in chair. O2 at 2l/nc. Pt ambulated 400ft today. Plan remains to home when medically ready. Pt will have friends staying with him 24/7 after discharge. [facility] is setup. No other needs were voiced at present. Cm/sw will follow.

## 2025-06-03 NOTE — PLAN OF CARE
Problem: Chronic Conditions and Co-morbidities  Goal: Patient's chronic conditions and co-morbidity symptoms are monitored and maintained or improved  Outcome: Progressing     Problem: Discharge Planning  Goal: Discharge to home or other facility with appropriate resources  Outcome: Progressing     Problem: Pain  Goal: Verbalizes/displays adequate comfort level or baseline comfort level  Outcome: Progressing     Problem: Safety - Adult  Goal: Free from fall injury  Outcome: Progressing     Problem: Respiratory - Adult  Goal: Achieves optimal ventilation and oxygenation  Outcome: Progressing     Problem: Cardiovascular - Adult  Goal: Maintains optimal cardiac output and hemodynamic stability  Outcome: Progressing     Problem: Metabolic/Fluid and Electrolytes - Adult  Goal: Electrolytes maintained within normal limits  Outcome: Progressing     Problem: Skin/Tissue Integrity - Adult  Goal: Skin integrity remains intact  Description: 1.  Monitor for areas of redness and/or skin breakdown2.  Assess vascular access sites hourly3.  Every 4-6 hours minimum:  Change oxygen saturation probe site4.  Every 4-6 hours:  If on nasal continuous positive airway pressure, respiratory therapy assess nares and determine need for appliance change or resting period  Outcome: Progressing

## 2025-06-03 NOTE — PLAN OF CARE
Problem: Chronic Conditions and Co-morbidities  Goal: Patient's chronic conditions and co-morbidity symptoms are monitored and maintained or improved  6/3/2025 0746 by Imelda Delaney RN  Outcome: Progressing     Problem: Discharge Planning  Goal: Discharge to home or other facility with appropriate resources  6/3/2025 0746 by Imelda Delaney RN  Outcome: Progressing     Problem: Pain  Goal: Verbalizes/displays adequate comfort level or baseline comfort level  6/3/2025 0746 by Imelda Delaney RN  Outcome: Progressing     Problem: Safety - Adult  Goal: Free from fall injury  6/3/2025 0746 by Imelda Delaney RN  Outcome: Progressing     Problem: Respiratory - Adult  Goal: Achieves optimal ventilation and oxygenation  6/3/2025 0746 by Imelda Delaney RN  Outcome: Progressing     Problem: Cardiovascular - Adult  Goal: Maintains optimal cardiac output and hemodynamic stability  6/3/2025 0746 by Imelda Delaney RN  Outcome: Progressing     Problem: Cardiovascular - Adult  Goal: Absence of cardiac dysrhythmias or at baseline  Outcome: Progressing     Problem: Metabolic/Fluid and Electrolytes - Adult  Goal: Electrolytes maintained within normal limits  6/3/2025 0746 by Imelda Delaney RN  Outcome: Progressing     Problem: Metabolic/Fluid and Electrolytes - Adult  Goal: Hemodynamic stability and optimal renal function maintained  Outcome: Progressing     Problem: Metabolic/Fluid and Electrolytes - Adult  Goal: Glucose maintained within prescribed range  Outcome: Progressing     Problem: Hematologic - Adult  Goal: Maintains hematologic stability  Outcome: Progressing     Problem: Skin/Tissue Integrity - Adult  Goal: Skin integrity remains intact  Description: 1.  Monitor for areas of redness and/or skin breakdown2.  Assess vascular access sites hourly3.  Every 4-6 hours minimum:  Change oxygen saturation probe site4.  Every 4-6 hours:  If on nasal continuous positive airway  pressure, respiratory therapy assess nares and determine need for appliance change or resting period  6/3/2025 0746 by Imelda Delaney, RN  Outcome: Progressing     Problem: Skin/Tissue Integrity - Adult  Goal: Incisions, wounds, or drain sites healing without S/S of infection  Outcome: Progressing     Problem: Musculoskeletal - Adult  Goal: Return mobility to safest level of function  Outcome: Progressing     Problem: Musculoskeletal - Adult  Goal: Maintain proper alignment of affected body part  Outcome: Progressing     Problem: Gastrointestinal - Adult  Goal: Minimal or absence of nausea and vomiting  Outcome: Progressing     Problem: Gastrointestinal - Adult  Goal: Maintains or returns to baseline bowel function  Outcome: Progressing     Problem: Gastrointestinal - Adult  Goal: Maintains adequate nutritional intake  Outcome: Progressing     Problem: Genitourinary - Adult  Goal: Absence of urinary retention  Outcome: Progressing     Problem: Genitourinary - Adult  Goal: Urinary catheter remains patent  Outcome: Progressing

## 2025-06-03 NOTE — PROGRESS NOTES
Physician Progress Note      PATIENT:               VALDO STONE  CSN #:                  [id number]  :                       1960  ADMIT DATE:       2025 5:03 AM  DISCH DATE:  RESPONDING  PROVIDER #:        Ernesto Westbrook MD          QUERY TEXT:    Congestive Heart Failure is documented in the medical record Thoracic surg PN    Dr. Westbrook/ NANETTE Pryor.  Please document the type and acuity:    The clinical indicators include:  -- Pt. with hx of CAD, HTN, DM, HLD, MI presents for CABG following abnormal   stress test / PCI  \"A TTE was completed on 25 with moderately reduced left ventricular   systolic function with a visually estimated EF of 40 - 45% \"(Per H&P  Dr. Westbrook/ MARIANA Gonzalez CNP)  --Heart size is normal.There are no effusions. (Per CXR )  --Cardiomegaly with improvement in the interstitial edema.Atelectatic changes   remaining at the lung bases with stable patchy airspace disease in the left  upper lobe. (per CXR )  --\"Expected acute pulmonary insufficiency in the setting following surgery.    diurese 30mgIV lasix x1 today -- weight up from baseline\" (Per CTS note  MARY KATE Gonzalez, ARMAAN)  --\" No increased work of breathing, good air exchange, clear to auscultation   bilaterally, no crackles or wheezing.  and no JVD, no carotid bruit, no pedal   edema, \" (Per cards consult )  Options provided:  -- Acute on Chronic Systolic CHF/HFrEF  -- Chronic Systolic CHF/HFrEF  -- Other - I will add my own diagnosis  -- Disagree - Not applicable / Not valid  -- Disagree - Clinically unable to determine / Unknown  -- Refer to Clinical Documentation Reviewer    PROVIDER RESPONSE TEXT:    This patient is in acute on chronic systolic CHF/HFrEF.    Query created by: Brandi Mckeon on 2025 1:34 PM      Electronically signed by:  Ernesto Westbrook MD 6/3/2025 7:52 AM

## 2025-06-03 NOTE — PROGRESS NOTES
POD#5 Awake, alert. No complaints. Denies CP, palpitations, SOB at rest, dizziness/lightheadedness.    Vitals:    06/02/25 2316 06/03/25 0330 06/03/25 0500 06/03/25 0737   BP: 112/68 115/71  101/62   Pulse: 73 70  81   Resp: 18 18  18   Temp: 96.8 °F (36 °C) 97.1 °F (36.2 °C)  97.1 °F (36.2 °C)   TempSrc: Temporal Temporal  Temporal   SpO2: 95% 93%  94%   Weight:   101.7 kg (224 lb 2 oz)    Height:         O2: 2L/NC 93%      Intake/Output Summary (Last 24 hours) at 6/3/2025 0852  Last data filed at 6/3/2025 0837  Gross per 24 hour   Intake 868.03 ml   Output 1370 ml   Net -501.97 ml         (+) BM 6/2      UO: 675+mL/8hr     CT OUTPUT:   Mediastinal: 40mL/8hr (95mL/24hrs)       Recent Labs     06/01/25  0412 06/02/25  0645 06/03/25  0552   WBC 14.0* 11.1 9.5   HGB 10.4* 10.8* 10.8*   HCT 33.7* 33.7* 33.8*    185 229      Recent Labs     06/01/25  0412 06/02/25  0645 06/03/25  0552   BUN 28* 22 21   CREATININE 0.8 0.8 0.8         Telemetry: SR      PE  Cardiac: RRR  Lungs: decreased bases  Chest incision with intact TELLY DSD.  Sternum stable. Prior chest tube site incisions C/D/I, no erythema with intact sutures. Chest tubes x 1 and Epicardial pacing wires present and secure.   Abd: Soft, nontender, +BS, mildly distended  Ext: Incisions C/D/I, approximated, no erythema, + LE edema, left radial artery harvest site C/D/I, approximated, no erythema, good , minor numbness noted to ring finger           A/P: POD#5    1. CAD  --Stable s/p Sternotomy/CABG x 2 (LIMA-LAD, Rad-OM)/Exploration RCA/ANGELA exclusion with 35mm AtriClip/Posterior pericardotomy/EVH RLE/Open LUE radial harvest/Rigid internal fixation of the sternum with [place] plates x 2 on 5/29/2025  --Post op ANDRESSA reveals  60% EF on inotropic support   --will order TTE day before or day of discharge to establish baseline and potential need for WCD on discharge -- order placed to be completed today  --Scr stable; 0.8  --(+)ASA/statin - no plavix for all  add prandial humalog once eating regular diet       10. HFrEF  --pre-op EF 40%  --post-op EF 60% on inotropic support  --add GDMT as able: currently on coreg, intermittent diuresis   --repeat TTE prior to dc to evaluate lv function and possible need for WCD--ordered placed to be completed today  --consulted chf clinic for outpatient establishment          DVT prophylaxis:  --continue bilateral knee high FEROZ hose  --continue PCDs  --continue progressive ambulation  --lovenox for dvt prophylaxis and continue knee high FEROZ hose/pcds/progressive ambulation      Dispo: home vs. Rehab pending progression in activity level        This patient's case and care plan was discussed with the attending surgeon

## 2025-06-04 LAB
ALBUMIN SERPL-MCNC: 3.6 G/DL (ref 3.5–5.2)
ALP SERPL-CCNC: 64 U/L (ref 40–129)
ALT SERPL-CCNC: 17 U/L (ref 0–50)
ANION GAP SERPL CALCULATED.3IONS-SCNC: 8 MMOL/L (ref 7–16)
AST SERPL-CCNC: 21 U/L (ref 0–50)
BASOPHILS # BLD: 0.05 K/UL (ref 0–0.2)
BASOPHILS NFR BLD: 1 % (ref 0–2)
BILIRUB SERPL-MCNC: 0.5 MG/DL (ref 0–1.2)
BUN SERPL-MCNC: 22 MG/DL (ref 8–23)
CALCIUM SERPL-MCNC: 8.9 MG/DL (ref 8.8–10.2)
CHLORIDE SERPL-SCNC: 98 MMOL/L (ref 98–107)
CO2 SERPL-SCNC: 32 MMOL/L (ref 22–29)
CREAT SERPL-MCNC: 0.9 MG/DL (ref 0.7–1.2)
EOSINOPHIL # BLD: 0.25 K/UL (ref 0.05–0.5)
EOSINOPHILS RELATIVE PERCENT: 3 % (ref 0–6)
ERYTHROCYTE [DISTWIDTH] IN BLOOD BY AUTOMATED COUNT: 13 % (ref 11.5–15)
GFR, ESTIMATED: >90 ML/MIN/1.73M2
GLUCOSE BLD-MCNC: 111 MG/DL (ref 74–99)
GLUCOSE BLD-MCNC: 136 MG/DL (ref 74–99)
GLUCOSE BLD-MCNC: 241 MG/DL (ref 74–99)
GLUCOSE BLD-MCNC: 262 MG/DL (ref 74–99)
GLUCOSE SERPL-MCNC: 120 MG/DL (ref 74–99)
HCT VFR BLD AUTO: 33.4 % (ref 37–54)
HGB BLD-MCNC: 10.7 G/DL (ref 12.5–16.5)
IMM GRANULOCYTES # BLD AUTO: 0.17 K/UL (ref 0–0.58)
IMM GRANULOCYTES NFR BLD: 2 % (ref 0–5)
LYMPHOCYTES NFR BLD: 1.61 K/UL (ref 1.5–4)
LYMPHOCYTES RELATIVE PERCENT: 17 % (ref 20–42)
MAGNESIUM SERPL-MCNC: 2 MG/DL (ref 1.6–2.4)
MCH RBC QN AUTO: 28.9 PG (ref 26–35)
MCHC RBC AUTO-ENTMCNC: 32 G/DL (ref 32–34.5)
MCV RBC AUTO: 90.3 FL (ref 80–99.9)
MONOCYTES NFR BLD: 1.63 K/UL (ref 0.1–0.95)
MONOCYTES NFR BLD: 17 % (ref 2–12)
NEUTROPHILS NFR BLD: 61 % (ref 43–80)
NEUTS SEG NFR BLD: 5.86 K/UL (ref 1.8–7.3)
PLATELET # BLD AUTO: 242 K/UL (ref 130–450)
PMV BLD AUTO: 10 FL (ref 7–12)
POTASSIUM SERPL-SCNC: 3.8 MMOL/L (ref 3.5–5.1)
PROT SERPL-MCNC: 5.9 G/DL (ref 6.4–8.3)
RBC # BLD AUTO: 3.7 M/UL (ref 3.8–5.8)
RBC # BLD: ABNORMAL 10*6/UL
SODIUM SERPL-SCNC: 138 MMOL/L (ref 136–145)
WBC OTHER # BLD: 9.6 K/UL (ref 4.5–11.5)

## 2025-06-04 PROCEDURE — 6370000000 HC RX 637 (ALT 250 FOR IP): Performed by: PHYSICIAN ASSISTANT

## 2025-06-04 PROCEDURE — 6360000002 HC RX W HCPCS: Performed by: PHYSICIAN ASSISTANT

## 2025-06-04 PROCEDURE — 94669 MECHANICAL CHEST WALL OSCILL: CPT

## 2025-06-04 PROCEDURE — 6370000000 HC RX 637 (ALT 250 FOR IP)

## 2025-06-04 PROCEDURE — 83735 ASSAY OF MAGNESIUM: CPT

## 2025-06-04 PROCEDURE — 93798 PHYS/QHP OP CAR RHAB W/ECG: CPT

## 2025-06-04 PROCEDURE — 85025 COMPLETE CBC W/AUTO DIFF WBC: CPT

## 2025-06-04 PROCEDURE — 94640 AIRWAY INHALATION TREATMENT: CPT

## 2025-06-04 PROCEDURE — 80053 COMPREHEN METABOLIC PANEL: CPT

## 2025-06-04 PROCEDURE — 2140000000 HC CCU INTERMEDIATE R&B

## 2025-06-04 PROCEDURE — 2700000000 HC OXYGEN THERAPY PER DAY

## 2025-06-04 PROCEDURE — 97530 THERAPEUTIC ACTIVITIES: CPT

## 2025-06-04 PROCEDURE — 97535 SELF CARE MNGMENT TRAINING: CPT

## 2025-06-04 PROCEDURE — 82962 GLUCOSE BLOOD TEST: CPT

## 2025-06-04 RX ORDER — FUROSEMIDE 10 MG/ML
40 INJECTION INTRAMUSCULAR; INTRAVENOUS ONCE
Status: COMPLETED | OUTPATIENT
Start: 2025-06-04 | End: 2025-06-04

## 2025-06-04 RX ADMIN — POTASSIUM CHLORIDE 40 MEQ: 1500 TABLET, EXTENDED RELEASE ORAL at 08:07

## 2025-06-04 RX ADMIN — INSULIN GLARGINE 14 UNITS: 100 INJECTION, SOLUTION SUBCUTANEOUS at 21:05

## 2025-06-04 RX ADMIN — ISOSORBIDE DINITRATE 5 MG: 10 TABLET ORAL at 21:04

## 2025-06-04 RX ADMIN — MIDODRINE HYDROCHLORIDE 10 MG: 10 TABLET ORAL at 12:05

## 2025-06-04 RX ADMIN — IPRATROPIUM BROMIDE AND ALBUTEROL SULFATE 1 DOSE: 2.5; .5 SOLUTION RESPIRATORY (INHALATION) at 09:29

## 2025-06-04 RX ADMIN — ISOSORBIDE DINITRATE 5 MG: 10 TABLET ORAL at 14:14

## 2025-06-04 RX ADMIN — IPRATROPIUM BROMIDE AND ALBUTEROL SULFATE 1 DOSE: 2.5; .5 SOLUTION RESPIRATORY (INHALATION) at 15:52

## 2025-06-04 RX ADMIN — ACETAMINOPHEN 650 MG: 325 TABLET ORAL at 14:14

## 2025-06-04 RX ADMIN — BISACODYL 5 MG: 5 TABLET, COATED ORAL at 08:07

## 2025-06-04 RX ADMIN — PANTOPRAZOLE SODIUM 40 MG: 40 TABLET, DELAYED RELEASE ORAL at 05:58

## 2025-06-04 RX ADMIN — Medication 500 MG: at 21:05

## 2025-06-04 RX ADMIN — INSULIN LISPRO 8 UNITS: 100 INJECTION, SOLUTION INTRAVENOUS; SUBCUTANEOUS at 17:08

## 2025-06-04 RX ADMIN — AMIODARONE HYDROCHLORIDE 400 MG: 200 TABLET ORAL at 08:06

## 2025-06-04 RX ADMIN — ENOXAPARIN SODIUM 30 MG: 100 INJECTION SUBCUTANEOUS at 08:06

## 2025-06-04 RX ADMIN — ASPIRIN 81 MG: 81 TABLET, COATED ORAL at 08:08

## 2025-06-04 RX ADMIN — MAGNESIUM SULFATE HEPTAHYDRATE 2000 MG: 40 INJECTION, SOLUTION INTRAVENOUS at 08:19

## 2025-06-04 RX ADMIN — ACETAMINOPHEN 650 MG: 325 TABLET ORAL at 08:12

## 2025-06-04 RX ADMIN — MIDODRINE HYDROCHLORIDE 10 MG: 10 TABLET ORAL at 08:07

## 2025-06-04 RX ADMIN — FERROUS SULFATE TAB 325 MG (65 MG ELEMENTAL FE) 325 MG: 325 (65 FE) TAB at 17:08

## 2025-06-04 RX ADMIN — IPRATROPIUM BROMIDE AND ALBUTEROL SULFATE 1 DOSE: 2.5; .5 SOLUTION RESPIRATORY (INHALATION) at 12:21

## 2025-06-04 RX ADMIN — INSULIN LISPRO 4 UNITS: 100 INJECTION, SOLUTION INTRAVENOUS; SUBCUTANEOUS at 12:05

## 2025-06-04 RX ADMIN — IPRATROPIUM BROMIDE AND ALBUTEROL SULFATE 1 DOSE: 2.5; .5 SOLUTION RESPIRATORY (INHALATION) at 20:19

## 2025-06-04 RX ADMIN — CARVEDILOL 3.12 MG: 3.12 TABLET, FILM COATED ORAL at 17:08

## 2025-06-04 RX ADMIN — FOLIC ACID 1 MG: 1 TABLET ORAL at 08:07

## 2025-06-04 RX ADMIN — FERROUS SULFATE TAB 325 MG (65 MG ELEMENTAL FE) 325 MG: 325 (65 FE) TAB at 08:08

## 2025-06-04 RX ADMIN — Medication 400 MG: at 21:05

## 2025-06-04 RX ADMIN — CARVEDILOL 3.12 MG: 3.12 TABLET, FILM COATED ORAL at 08:08

## 2025-06-04 RX ADMIN — Medication 400 MG: at 08:07

## 2025-06-04 RX ADMIN — MIDODRINE HYDROCHLORIDE 10 MG: 10 TABLET ORAL at 17:08

## 2025-06-04 RX ADMIN — FUROSEMIDE 40 MG: 10 INJECTION, SOLUTION INTRAMUSCULAR; INTRAVENOUS at 09:14

## 2025-06-04 RX ADMIN — ACETAMINOPHEN 650 MG: 325 TABLET ORAL at 18:24

## 2025-06-04 RX ADMIN — SENNOSIDES AND DOCUSATE SODIUM 1 TABLET: 50; 8.6 TABLET ORAL at 08:06

## 2025-06-04 RX ADMIN — Medication 500 MG: at 08:08

## 2025-06-04 RX ADMIN — ATORVASTATIN CALCIUM 40 MG: 40 TABLET, FILM COATED ORAL at 21:05

## 2025-06-04 RX ADMIN — ISOSORBIDE DINITRATE 5 MG: 10 TABLET ORAL at 08:06

## 2025-06-04 RX ADMIN — IBUPROFEN 400 MG: 400 TABLET, FILM COATED ORAL at 21:04

## 2025-06-04 RX ADMIN — IBUPROFEN 400 MG: 400 TABLET, FILM COATED ORAL at 14:14

## 2025-06-04 RX ADMIN — ENOXAPARIN SODIUM 30 MG: 100 INJECTION SUBCUTANEOUS at 21:09

## 2025-06-04 RX ADMIN — AMIODARONE HYDROCHLORIDE 400 MG: 200 TABLET ORAL at 21:05

## 2025-06-04 ASSESSMENT — PAIN - FUNCTIONAL ASSESSMENT
PAIN_FUNCTIONAL_ASSESSMENT: PREVENTS OR INTERFERES SOME ACTIVE ACTIVITIES AND ADLS

## 2025-06-04 ASSESSMENT — PAIN SCALES - GENERAL
PAINLEVEL_OUTOF10: 1
PAINLEVEL_OUTOF10: 3
PAINLEVEL_OUTOF10: 0
PAINLEVEL_OUTOF10: 1
PAINLEVEL_OUTOF10: 3
PAINLEVEL_OUTOF10: 3
PAINLEVEL_OUTOF10: 4
PAINLEVEL_OUTOF10: 1
PAINLEVEL_OUTOF10: 0

## 2025-06-04 ASSESSMENT — PAIN DESCRIPTION - LOCATION
LOCATION: CHEST
LOCATION: CHEST;INCISION

## 2025-06-04 ASSESSMENT — PAIN DESCRIPTION - DESCRIPTORS
DESCRIPTORS: ACHING;DISCOMFORT;SORE

## 2025-06-04 ASSESSMENT — PAIN DESCRIPTION - ORIENTATION
ORIENTATION: MID
ORIENTATION: RIGHT;LEFT
ORIENTATION: MID
ORIENTATION: MID

## 2025-06-04 NOTE — CARE COORDINATION
Transition of care update: POD#6 CABG x 2. Wires cont. Hgb 10.7 and other labs noted. IV lasix 40mg x 1. Pt ambulated 400ft again today with cardiac rehab. Met with pt in room. Up in chair. O2 was at 1.5l/nc. Plan remains to home when medically ready. Pt stated he will have family and friends with him 24/7. [facility] hhc is setup. Answered pt's questions regarding HHC. No preference for dme agency if home o2 is needed. Pt agreeable to [facility] dme. No other needs were voiced for home at present. Cm/sw will follow.

## 2025-06-04 NOTE — CONSULTS
CHF education and appointments discussed this admission-  Please see heart failure nurse navigator consult note on 6/2/2025.      Future Appointments   Date Time Provider Department Center   6/16/2025 11:15 AM Adilene Mckeon APRN - CNP [facility] CHF [facility]   6/23/2025  9:30 AM NORMA CHF ROOM 1 SEB CHF [facility]   7/8/2025 10:30 AM Ernesto Westbrook MD CARDIO SURG [facility]     Electronically signed by Belkis Shetty RN on 6/4/2025 at 9:07 AM

## 2025-06-04 NOTE — PROGRESS NOTES
sitting in bedside chair. STS and gait completed using fww for light support. Reviewed KYMITT which he adhered to. No fww used for gait. Gait completed to steps. Able to complete 1 flight. Returned to room able to ambulate 400 feet.    At end of session, patient in bed per request of CTS with  call light and phone within reach,  all lines and tubes intact, nursing notified.   This patient can benefit from the continuation of skilled PT during this admission to maximize functional level and return to PLOF.     Treatment:  Patient completed and was instructed in the following treatment:      *Bed mobility training - pt given verbal and tactile cues to facilitate proper sequencing and safety during rolling and supine>sit as well as provided with physical assistance to complete task  *STS and transfer training - educated on hand/foot placement, safety, and sequencing during STS and pivot transfers using assistive device  *Gait training - verbal cues for Wheeled Walker approximation/negotiation, upright posture, and safety during 90 and 180 degree turns during gait   Stair negotiation training.  *Patient Education- regarding use of call light for safety, KYMITT, and walker safety sequencing.  *Skillful positioning in bed to protect skin/joint integrity.  *Vitals and symptoms were closely monitored throughout session.    Pt's/ family goals      Return Home    Prognosis is good  for reaching above PT goals.    Patient and or family understand(s) diagnosis, prognosis, and plan of care.  yes    PHYSICAL THERAPY PLAN OF CARE:    PT POC is established based on physician order and patient diagnosis     Diagnosis:  Coronary artery disease involving native coronary artery of native heart with other form of angina pectoris [I25.118]  Specific instructions for next treatment:  Increase ambulation distance, Stair negotiation training, and Review KYMITT  Current Treatment Recommendations:     [x] Strengthening to improve independence

## 2025-06-04 NOTE — PROGRESS NOTES
Occupational Therapy  OT BEDSIDE TREATMENT NOTE   [facility]  [address], [place]      Date:2025  Patient Name: Patrick Pickering  MRN: [id number]  : 1960  Room: [address] [place] [address]     Evaluating OT: Tin Arnett OTR/L #[id number]      Referring Provider: Robyn Blackwell PA   Specific Provider Orders/Date: OT eval and treat 25     Diagnosis: Coronary artery disease involving native coronary artery of native heart with other form of angina pectoris [I25.118]   Pt admitted to hospital for scheduled surgery      Surgery: CABG x2 25      Pertinent Medical History:  has a past medical history of CAD (coronary artery disease), DM (diabetes mellitus) ([facility]), HTN (hypertension), Hyperlipemia, and MI (myocardial infarction) ([facility]).         Precautions:  Fall Risk, KYMITT, O2     Assessment of current deficits    [x] Functional mobility          [x]ADLs           [x] Strength                  []Cognition    [x] Functional transfers        [x] IADLs         [x] Safety Awareness   [x]Endurance    [] Fine Coordination                        [x] Balance      [] Vision/perception   []Sensation      []Gross Motor Coordination            [] ROM           [] Delirium                   [] Motor Control      OT PLAN OF CARE   OT POC based on physician orders, patient diagnosis and results of clinical assessment     Frequency/Duration 1-5 days/wk for 2 weeks PRN   Specific OT Treatment Interventions to include:   * Instruction/training on adapted ADL techniques and AE recommendations to increase functional independence within precautions       * Training on energy conservation strategies, correct breathing pattern and techniques to improve independence/tolerance for self-care routine  * Functional transfer/mobility training/DME recommendations for increased independence, safety, and fall prevention  * Patient/Family education to increase follow through with safety  A  Simulated task sitting in chair, edu on use of shower chair/tub bench for energy conservation   Modified [place]    Toileting Moderate Assist     Min A  For clothing management/ hygiene  Modified [place]    Bed Mobility  Supine to sit: NT   Sit to supine: Minimal Assist   NT Supine to sit: Modified [place]   Sit to supine: Modified [place]    Functional Transfers Moderate Assist        SBA  Vc for body mechanics  Modified [place]    Functional Mobility Minimal Assist      Ambulated in room and hallway with w/w (cuing on safety and KYMITT) Min A  No AD for household distance, vc for pacing  Modified [place]    Balance Sitting:     Static:  SBA    Dynamic:SBA  Standing: min A Sitting:     Static:  SBA    Dynamic:SBA  Standing: min A      Activity Tolerance F-     Pt limited due to fatigue and weakness F-     Pt quick to fatigue  O2 >89% on RA  Min SOB noted F+   Visual/  Perceptual wfl                            Comments: Per RN pt OK for treatment. Upon arrival pt sitting in chair and agreeable to OT session. ADL retraining to increase independence in dressing and toileting tasks, balance and trf training to increase participation in functional mobility and standing aspects of ADLs with increased safety. Pt educated on modified techniques to increase participation in ADL's, bed mobility, and functional transfers.   At end of session pt left seated in bedside chair, call light within reach.     Pt has made fair progress towards set goals.     Continue with current plan of care    Treatment Time In:0824            Treatment Time Out: 0847             Treatment Charges: Mins Units   Ther Ex  93792     Manual Therapy 61161     Thera Activities 40938 10 1   ADL/Home Mgt 82499 13 1   Neuro Re-ed 20551     Group Therapy      Orthotic manage/training  93993     Non-Billable Time     Total Timed Treatment 23 2     [id number]

## 2025-06-04 NOTE — DISCHARGE INSTR - DIET
carbohydrate servings)  ¾ cup blueberries (1 carbohydrate serving)  11 almonds, without salt  1 cup soymilk fortified with calcium, vitamin B12, and vitamin D  1 cup coffee   Morning Snack 6 ounces soy yogurt (1½ carbohydrate servings)   Lunch 1 whole wheat bun(1½ carbohydrate servings)  1 black bean burger (1 carbohydrate serving)  2 slices tomatoes  2 leaves lettuce  1 teaspoon mustard  1 small pear (1½ carbohydrate servings)  1 cup green tea, unsweetened   Afternoon Snack 1/3 cup trail mix with nuts, seeds, and raisins, without salt (1½ carbohydrate servinga)   Evening Meal ½ cup meatless chicken  2/3 cup brown rice, cooked (2 carbohydrate servings)  1 cup broccoli, cooked (2/3 carbohydrate serving)  ½ cup carrots, cooked (1/3 carbohydrate serving)  2 teaspoons olive oil  1 teaspoon balsamic vinegar  1 whole wheat dinner roll (1 carbohydrate serving)  1 teaspoon margarine, soft, tub  1 cup soymilk fortified with calcium, vitamin B12, and vitamin D   Evening Snack 1 extra small banana (1 carbohydrate serving)  1 tablespoon peanut butter   Daily Sum  Nutrient Unit Value   Macronutrients   Energy kcal 1989   Energy kJ 8315   Protein g 85   Total lipid (fat) g 78   Carbohydrate, by difference g 258   Fiber, total dietary g 42   Sugars, total g 68   Minerals   Calcium, Ca mg 1343   Iron, Fe mg 16   Sodium, Na mg 1805   Vitamins   Vitamin C, total ascorbic acid mg 167   Vitamin A, IU IU 35362   Vitamin D    Lipids   Fatty acids, total saturated g 12   Fatty acids, total monounsaturated g 31   Fatty acids, total polyunsaturated g 25   Cholesterol mg 0        Heart Healthy Consistent Carbohydrate Vegetarian (Lacto-Ovo) Sample 1-Day Menu View Nutrient Info  Breakfast 1 cup oatmeal, cooked (2 carbohydrate servings)  ¾ cup blueberries (1 carbohydrate serving)  11 almonds, without salt  1 cup 1% milk (1 carbohydrate serving)  1 cup coffee   Morning Snack 1 cup fat-free plain yogurt (1 carbohydrate serving)   Lunch 1  whole wheat bun (1½ carbohydrate servings)  1 black bean burger (1 carbohydrate servings)  1 slice cheddar cheese, low sodium  2 slices tomatoes  2 leaves lettuce  1 teaspoon mustard  1 small pear (1½ carbohydrate servings)  1 cup green tea, unsweetened   Afternoon Snack 1/3 cup trail mix with nuts, seeds, and raisins, without salt (1½ carbohydrate servinga)   Evening Meal ½ cup meatless chicken  2/3 cup brown rice, cooked (2 carbohydrate servings)  1 cup broccoli, cooked (2/3 carbohydrate serving)  ½ cup carrots, cooked (1/3 carbohydrate serving)  2 teaspoons olive oil  1 teaspoon balsamic vinegar  1 whole wheat dinner roll (1 carbohydrate serving)  1 teaspoon margarine, soft, tub  1 cup 1% milk (1 carbohydrate serving)   Evening Snack 1 extra small banana (1 carbohydrate serving)  1 tablespoon peanut butter   Daily Sum  Nutrient Unit Value   Macronutrients   Energy kcal 2093   Energy kJ 8755   Protein g 97   Total lipid (fat) g 81   Carbohydrate, by difference g 269   Fiber, total dietary g 39   Sugars, total g 96   Minerals   Calcium, Ca mg 1571   Iron, Fe mg 15   Sodium, Na mg 1950   Vitamins   Vitamin C, total ascorbic acid mg 139   Vitamin A, IU IU 00338   Vitamin D    Lipids   Fatty acids, total saturated g 20   Fatty acids, total monounsaturated g 33   Fatty acids, total polyunsaturated g 20   Cholesterol mg 57

## 2025-06-04 NOTE — PROGRESS NOTES
Comprehensive Nutrition Assessment    Type and Reason for Visit:  Initial, Consult, Patient education    Nutrition Recommendations/Plan:   Recommend and start Glucerna supplement TID and Johnny wound healing supplement BID to help meet increased nutritional needs from surgical wound healing.    Recommend outpatient nutritional counseling for further education.         Malnutrition Assessment:  Malnutrition Status:  At risk for malnutrition (06/04/25 1025)    Context:  Acute Illness     Findings of the 6 clinical characteristics of malnutrition:  Energy Intake:  Mild decrease in energy intake (since admission)  Weight Loss:  Unable to assess (d/t possible fluid shifts ; hx of CHF)     Body Fat Loss:  Unable to assess     Muscle Mass Loss:  Unable to assess    Fluid Accumulation:  No fluid accumulation     Strength:  Not Performed    Nutrition Assessment:    Patient adm w/ CAD ; s/p cardiac cath on 5/6 ; s/p CABG x 2 on 5/29 ; noted post operative hypotension and stress-induced hyperglycemia ; hx of DM/CAD/CHF/diverticulosis/severe COPD ; will provide recommendations    Nutrition Related Findings:    +I&Os (+3.2 L), trace edema, redness to coccyx, A&O x 4, missing teeth, active BS, hyperglycemia ; Wound Type: Multiple, Surgical Incision (Incisions x 4)       Current Nutrition Intake & Therapies:    Average Meal Intake: 51-75%     ADULT ORAL NUTRITION SUPPLEMENT; Breakfast, Lunch, Dinner; Diabetic Oral Supplement  ADULT ORAL NUTRITION SUPPLEMENT; Breakfast, Dinner; Wound Healing Oral Supplement  ADULT DIET; Regular; 4 carb choices (60 gm/meal); Low Fat/Low Chol/High Fiber/2 gm Na; No Added Salt (3-4 gm); High Fiber    Anthropometric Measures:  Height: 177.8 cm (5' 10\")  Ideal Body Weight (IBW): 166 lbs (75 kg)    Admission Body Weight: 108 kg (238 lb) (5/30, bedscale)  Current Body Weight: 102.1 kg (225 lb) (6/4, standing scale), 135.5 % IBW. Weight Source: Standing scale  Current BMI (kg/m2): 32.3  Usual Body  Weight:  (UTO ; EMR shows past weight of 213# actual on 3/26/25)                          BMI Categories: Obese Class 1 (BMI 30.0-34.9)    Estimated Daily Nutrient Needs:  Energy Requirements Based On: Formula  Weight Used for Energy Requirements: Current  Energy (kcal/day): 9545-3901 (REE 1829 x 1.3 SF)  Weight Used for Protein Requirements: Ideal  Protein (g/day): 120-140 (1.6-1.8g/kg IBW)  Method Used for Fluid Requirements: 1 ml/kcal  Fluid (ml/day): 1829-1425    Nutrition Diagnosis:   Increased nutrient needs related to increase demand for energy/nutrients as evidenced by wounds (surgical)    Nutrition Interventions:   Food and/or Nutrient Delivery: Continue Current Diet, Continue Oral Nutrition Supplement, Modify Oral Nutrition Supplement  Nutrition Education/Counseling: Education/Counseling initiated  Coordination of Nutrition Care: Continue to monitor while inpatient       Goals:  Goals: Meet at least 75% of estimated needs, by next RD assessment  Type of Goal: New goal  Previous Goal Met: New Goal    Nutrition Monitoring and Evaluation:   Behavioral-Environmental Outcomes: None Identified  Food/Nutrient Intake Outcomes: Food and Nutrient Intake, Supplement Intake  Physical Signs/Symptoms Outcomes: Biochemical Data, Chewing or Swallowing, GI Status, Fluid Status or Edema, Hemodynamic Status, Meal Time Behavior, Constipation, Weight, Skin, Nutrition Focused Physical Findings    Discharge Planning:    Continue Oral Nutrition Supplement     Andrews Lara RD, LD  Contact: [phone]

## 2025-06-04 NOTE — PROGRESS NOTES
POD# 6 Awake, alert. No complaints.   Denies CP, palpitations, SOB at rest, dizziness/lightheadedness.    Vitals:    06/03/25 2244 06/04/25 0226 06/04/25 0357 06/04/25 0730   BP: 119/66 126/63  112/64   Pulse: 87 76  78   Resp: 17 16  18   Temp: 98.5 °F (36.9 °C) 97.8 °F (36.6 °C)  98.2 °F (36.8 °C)   TempSrc: Temporal Oral  Temporal   SpO2: 95% 95%  96%   Weight:   102.2 kg (225 lb 3.2 oz)    Height:         O2: 2L/NC      Intake/Output Summary (Last 24 hours) at 6/4/2025 0849  Last data filed at 6/4/2025 0558  Gross per 24 hour   Intake 1240 ml   Output 2425 ml   Net -1185 ml         +BM on 6/3    UO: 350mL/8hr           Recent Labs     06/02/25  0645 06/03/25  0552 06/04/25  0545   WBC 11.1 9.5 9.6   HGB 10.8* 10.8* 10.7*   HCT 33.7* 33.8* 33.4*    229 242      Recent Labs     06/02/25  0645 06/03/25  0552 06/04/25  0545   BUN 22 21 22   CREATININE 0.8 0.8 0.9         Telemetry: SR      PE  Cardiac: RRR  Lungs: decreased bases  Chest incision with intact TELLY DSD.  Sternum stable. Prior chest tube site incisions C/D/I, no erythema with intact sutures. Epicardial pacing wires present and secure.   Abd: Soft, nontender, +BS, mildly distended  Ext: Incisions C/D/I, approximated, no erythema, + LE edema, left radial artery harvest site C/D/I, approximated, no erythema, good , minor numbness noted to ring finger          A/P: POD# 6    1.  CAD  --Stable s/p Sternotomy/CABG x 2 (LIMA-LAD, Rad-OM)/Exploration RCA/ANGELA exclusion with 35mm AtriClip/Posterior pericardotomy/EVH RLE/Open LUE radial harvest/Rigid internal fixation of the sternum with [place] plates x 2 on 5/29/2025  --Post op ANDRESSA reveals  60% EF on inotropic support   --Post op TTE - EF 50-55% - no need for WCD   --Scr stable; 0.9  --(+)ASA/statin/BB - no plavix for all arterial grafts   --continue isordil 5mg TID for radial artery graft protection - to cont x 1 year   --reinforce sternal precautions- move in the tube protocol   --continue  intermittent diuresis   --consulted chf clinic for outpatient establishment ( order placed 6/4)           DVT prophylaxis:  --continue bilateral knee high FEROZ hose  --continue PCDs  --continue progressive ambulation  --lovenox for dvt prophylaxis and continue knee high FEROZ hose/pcds/progressive ambulation        Dispo: home vs. Rehab pending progression in activity level         This patient's case and care plan was discussed with the attending surgeon

## 2025-06-05 VITALS
HEART RATE: 70 BPM | TEMPERATURE: 97 F | OXYGEN SATURATION: 91 % | DIASTOLIC BLOOD PRESSURE: 71 MMHG | BODY MASS INDEX: 31.01 KG/M2 | HEIGHT: 70 IN | WEIGHT: 216.6 LBS | SYSTOLIC BLOOD PRESSURE: 122 MMHG | RESPIRATION RATE: 18 BRPM

## 2025-06-05 LAB
ALBUMIN SERPL-MCNC: 3.7 G/DL (ref 3.5–5.2)
ALP SERPL-CCNC: 81 U/L (ref 40–129)
ALT SERPL-CCNC: 27 U/L (ref 0–50)
ANION GAP SERPL CALCULATED.3IONS-SCNC: 10 MMOL/L (ref 7–16)
AST SERPL-CCNC: 38 U/L (ref 0–50)
BASOPHILS # BLD: 0.08 K/UL (ref 0–0.2)
BASOPHILS NFR BLD: 1 % (ref 0–2)
BILIRUB SERPL-MCNC: 0.6 MG/DL (ref 0–1.2)
BUN SERPL-MCNC: 24 MG/DL (ref 8–23)
CALCIUM SERPL-MCNC: 9.1 MG/DL (ref 8.8–10.2)
CHLORIDE SERPL-SCNC: 99 MMOL/L (ref 98–107)
CO2 SERPL-SCNC: 30 MMOL/L (ref 22–29)
CREAT SERPL-MCNC: 0.9 MG/DL (ref 0.7–1.2)
EOSINOPHIL # BLD: 0.16 K/UL (ref 0.05–0.5)
EOSINOPHILS RELATIVE PERCENT: 2 % (ref 0–6)
ERYTHROCYTE [DISTWIDTH] IN BLOOD BY AUTOMATED COUNT: 13.2 % (ref 11.5–15)
GFR, ESTIMATED: >90 ML/MIN/1.73M2
GLUCOSE BLD-MCNC: 132 MG/DL (ref 74–99)
GLUCOSE BLD-MCNC: 172 MG/DL (ref 74–99)
GLUCOSE SERPL-MCNC: 131 MG/DL (ref 74–99)
HCT VFR BLD AUTO: 34.1 % (ref 37–54)
HGB BLD-MCNC: 11 G/DL (ref 12.5–16.5)
LYMPHOCYTES NFR BLD: 0.72 K/UL (ref 1.5–4)
LYMPHOCYTES RELATIVE PERCENT: 8 % (ref 20–42)
MAGNESIUM SERPL-MCNC: 2.1 MG/DL (ref 1.6–2.4)
MCH RBC QN AUTO: 29 PG (ref 26–35)
MCHC RBC AUTO-ENTMCNC: 32.3 G/DL (ref 32–34.5)
MCV RBC AUTO: 90 FL (ref 80–99.9)
METAMYELOCYTES ABSOLUTE COUNT: 0.16 K/UL (ref 0–0.12)
METAMYELOCYTES: 2 % (ref 0–1)
MONOCYTES NFR BLD: 0.88 K/UL (ref 0.1–0.95)
MONOCYTES NFR BLD: 10 % (ref 2–12)
NEUTROPHILS NFR BLD: 78 % (ref 43–80)
NEUTS SEG NFR BLD: 7.01 K/UL (ref 1.8–7.3)
PLATELET # BLD AUTO: 288 K/UL (ref 130–450)
PMV BLD AUTO: 10.1 FL (ref 7–12)
POTASSIUM SERPL-SCNC: 4 MMOL/L (ref 3.5–5.1)
PROT SERPL-MCNC: 6.2 G/DL (ref 6.4–8.3)
RBC # BLD AUTO: 3.79 M/UL (ref 3.8–5.8)
RBC # BLD: ABNORMAL 10*6/UL
RBC # BLD: ABNORMAL 10*6/UL
SODIUM SERPL-SCNC: 138 MMOL/L (ref 136–145)
WBC OTHER # BLD: 9 K/UL (ref 4.5–11.5)

## 2025-06-05 PROCEDURE — 6370000000 HC RX 637 (ALT 250 FOR IP)

## 2025-06-05 PROCEDURE — 6360000002 HC RX W HCPCS: Performed by: NURSE PRACTITIONER

## 2025-06-05 PROCEDURE — 6370000000 HC RX 637 (ALT 250 FOR IP): Performed by: PHYSICIAN ASSISTANT

## 2025-06-05 PROCEDURE — 93798 PHYS/QHP OP CAR RHAB W/ECG: CPT

## 2025-06-05 PROCEDURE — 94669 MECHANICAL CHEST WALL OSCILL: CPT

## 2025-06-05 PROCEDURE — 6360000002 HC RX W HCPCS: Performed by: PHYSICIAN ASSISTANT

## 2025-06-05 PROCEDURE — 83735 ASSAY OF MAGNESIUM: CPT

## 2025-06-05 PROCEDURE — 80053 COMPREHEN METABOLIC PANEL: CPT

## 2025-06-05 PROCEDURE — 94640 AIRWAY INHALATION TREATMENT: CPT

## 2025-06-05 PROCEDURE — 82962 GLUCOSE BLOOD TEST: CPT

## 2025-06-05 PROCEDURE — 85025 COMPLETE CBC W/AUTO DIFF WBC: CPT

## 2025-06-05 RX ORDER — FERROUS SULFATE 325(65) MG
325 TABLET ORAL 2 TIMES DAILY WITH MEALS
Qty: 60 TABLET | Refills: 0 | Status: SHIPPED | OUTPATIENT
Start: 2025-06-05 | End: 2025-07-05

## 2025-06-05 RX ORDER — FOLIC ACID 1 MG/1
1 TABLET ORAL DAILY
Qty: 30 TABLET | Refills: 0 | Status: SHIPPED | OUTPATIENT
Start: 2025-06-06 | End: 2025-07-06

## 2025-06-05 RX ORDER — DOCUSATE SODIUM 100 MG/1
100 CAPSULE, LIQUID FILLED ORAL 2 TIMES DAILY PRN
Qty: 60 CAPSULE | Refills: 0 | Status: SHIPPED | OUTPATIENT
Start: 2025-06-05 | End: 2025-07-05

## 2025-06-05 RX ORDER — LANOLIN ALCOHOL/MO/W.PET/CERES
400 CREAM (GRAM) TOPICAL 2 TIMES DAILY
Qty: 28 TABLET | Refills: 0 | Status: SHIPPED | OUTPATIENT
Start: 2025-06-05 | End: 2025-06-19

## 2025-06-05 RX ORDER — FUROSEMIDE 10 MG/ML
40 INJECTION INTRAMUSCULAR; INTRAVENOUS ONCE
Status: COMPLETED | OUTPATIENT
Start: 2025-06-05 | End: 2025-06-05

## 2025-06-05 RX ORDER — MIDODRINE HYDROCHLORIDE 10 MG/1
10 TABLET ORAL
Qty: 90 TABLET | Refills: 2 | Status: SHIPPED | OUTPATIENT
Start: 2025-06-05

## 2025-06-05 RX ORDER — CARVEDILOL 3.12 MG/1
3.12 TABLET ORAL 2 TIMES DAILY WITH MEALS
Qty: 60 TABLET | Refills: 3 | Status: SHIPPED | OUTPATIENT
Start: 2025-06-05

## 2025-06-05 RX ORDER — ISOSORBIDE DINITRATE 5 MG/1
5 TABLET ORAL 3 TIMES DAILY
Qty: 90 TABLET | Refills: 12 | Status: SHIPPED | OUTPATIENT
Start: 2025-06-05

## 2025-06-05 RX ORDER — ATORVASTATIN CALCIUM 40 MG/1
40 TABLET, FILM COATED ORAL NIGHTLY
Qty: 30 TABLET | Refills: 3 | Status: SHIPPED | OUTPATIENT
Start: 2025-06-05

## 2025-06-05 RX ORDER — OXYCODONE AND ACETAMINOPHEN 5; 325 MG/1; MG/1
1 TABLET ORAL EVERY 6 HOURS PRN
Qty: 28 TABLET | Refills: 0 | Status: SHIPPED | OUTPATIENT
Start: 2025-06-05 | End: 2025-06-12

## 2025-06-05 RX ORDER — ASCORBIC ACID 500 MG
500 TABLET ORAL 2 TIMES DAILY
Qty: 60 TABLET | Refills: 0 | Status: SHIPPED | OUTPATIENT
Start: 2025-06-05 | End: 2025-07-05

## 2025-06-05 RX ORDER — AMIODARONE HYDROCHLORIDE 200 MG/1
400 TABLET ORAL SEE ADMIN INSTRUCTIONS
Qty: 56 TABLET | Refills: 0 | Status: SHIPPED | OUTPATIENT
Start: 2025-06-05

## 2025-06-05 RX ORDER — PANTOPRAZOLE SODIUM 40 MG/1
40 TABLET, DELAYED RELEASE ORAL
Qty: 14 TABLET | Refills: 0 | Status: SHIPPED | OUTPATIENT
Start: 2025-06-06 | End: 2025-06-20

## 2025-06-05 RX ADMIN — ACETAMINOPHEN 650 MG: 325 TABLET ORAL at 07:10

## 2025-06-05 RX ADMIN — FERROUS SULFATE TAB 325 MG (65 MG ELEMENTAL FE) 325 MG: 325 (65 FE) TAB at 08:00

## 2025-06-05 RX ADMIN — MAGNESIUM SULFATE HEPTAHYDRATE 2000 MG: 40 INJECTION, SOLUTION INTRAVENOUS at 08:06

## 2025-06-05 RX ADMIN — FUROSEMIDE 40 MG: 10 INJECTION, SOLUTION INTRAMUSCULAR; INTRAVENOUS at 10:21

## 2025-06-05 RX ADMIN — IBUPROFEN 400 MG: 400 TABLET, FILM COATED ORAL at 08:01

## 2025-06-05 RX ADMIN — ISOSORBIDE DINITRATE 5 MG: 10 TABLET ORAL at 08:00

## 2025-06-05 RX ADMIN — AMIODARONE HYDROCHLORIDE 400 MG: 200 TABLET ORAL at 08:00

## 2025-06-05 RX ADMIN — Medication 400 MG: at 08:00

## 2025-06-05 RX ADMIN — POTASSIUM CHLORIDE 20 MEQ: 1500 TABLET, EXTENDED RELEASE ORAL at 08:00

## 2025-06-05 RX ADMIN — CARVEDILOL 3.12 MG: 3.12 TABLET, FILM COATED ORAL at 08:00

## 2025-06-05 RX ADMIN — PANTOPRAZOLE SODIUM 40 MG: 40 TABLET, DELAYED RELEASE ORAL at 05:16

## 2025-06-05 RX ADMIN — ACETAMINOPHEN 650 MG: 325 TABLET ORAL at 11:16

## 2025-06-05 RX ADMIN — IPRATROPIUM BROMIDE AND ALBUTEROL SULFATE 1 DOSE: 2.5; .5 SOLUTION RESPIRATORY (INHALATION) at 08:32

## 2025-06-05 RX ADMIN — ENOXAPARIN SODIUM 30 MG: 100 INJECTION SUBCUTANEOUS at 07:59

## 2025-06-05 RX ADMIN — MIDODRINE HYDROCHLORIDE 10 MG: 10 TABLET ORAL at 11:57

## 2025-06-05 RX ADMIN — BISACODYL 5 MG: 5 TABLET, COATED ORAL at 07:59

## 2025-06-05 RX ADMIN — FOLIC ACID 1 MG: 1 TABLET ORAL at 08:00

## 2025-06-05 RX ADMIN — MIDODRINE HYDROCHLORIDE 10 MG: 10 TABLET ORAL at 08:00

## 2025-06-05 RX ADMIN — ASPIRIN 81 MG: 81 TABLET, COATED ORAL at 08:01

## 2025-06-05 RX ADMIN — Medication 500 MG: at 08:00

## 2025-06-05 ASSESSMENT — PAIN DESCRIPTION - LOCATION
LOCATION: CHEST;INCISION
LOCATION: CHEST
LOCATION: CHEST;INCISION

## 2025-06-05 ASSESSMENT — PAIN - FUNCTIONAL ASSESSMENT
PAIN_FUNCTIONAL_ASSESSMENT: PREVENTS OR INTERFERES SOME ACTIVE ACTIVITIES AND ADLS

## 2025-06-05 ASSESSMENT — PAIN DESCRIPTION - ORIENTATION
ORIENTATION: MID

## 2025-06-05 ASSESSMENT — PAIN SCALES - GENERAL
PAINLEVEL_OUTOF10: 1
PAINLEVEL_OUTOF10: 0
PAINLEVEL_OUTOF10: 3
PAINLEVEL_OUTOF10: 3
PAINLEVEL_OUTOF10: 2
PAINLEVEL_OUTOF10: 0
PAINLEVEL_OUTOF10: 3

## 2025-06-05 ASSESSMENT — PAIN DESCRIPTION - DESCRIPTORS
DESCRIPTORS: ACHING;DISCOMFORT;SORE

## 2025-06-05 NOTE — CONSULTS
Met with patient and discussed that their physician has ordered a referral to our Home Heart Health and our outpatient Phase II Cardiac Rehabilitation program. Reviewed the benefits of cardiac rehabilitation based on their diagnosis and personal risk factors. Patient demonstrates strong interest in Cardiac Rehabilitation at this time.  Cardiac Rehabilitation brochure provided to patient/family. The Cardiac Rehabilitation Program has been provided the patient's referral information and pertinent patient details and history. The patient may call [facility] at [phone] for additional information or questions. Contact information for [facility] and other choices close to the patient's residence have been provided in the discharge instructions so that the patient may call and schedule an appointment when cleared by their physician. Thank you for the referral.

## 2025-06-05 NOTE — CARE COORDINATION
Transition of care update: Discharge order on chart. POD#7 CABG x 2. Wires cut. On RA. Met with pt in room. Up in chair. Plan remains to home. No other needs were voiced for home. [facility] is setup with HHC order on chart. Notified Jacqueline with [facility] of discharge today. Pt said his brother, Norberto, will provide transportation.

## 2025-06-05 NOTE — PROGRESS NOTES
POD#7 Awake, alert. No complaints. Denies CP, palpitations, SOB at rest, dizziness/lightheadedness.    Vitals:    06/05/25 0000 06/05/25 0328 06/05/25 0412 06/05/25 0738   BP: 109/69 119/68  112/67   Pulse: 71 72  72   Resp: 16 17  18   Temp: 97.9 °F (36.6 °C) 97.1 °F (36.2 °C)  97.5 °F (36.4 °C)   TempSrc: Oral Oral  Temporal   SpO2: 94% 95%  93%   Weight:   98.2 kg (216 lb 9.6 oz)    Height:         O2: none      Intake/Output Summary (Last 24 hours) at 6/5/2025 0952  Last data filed at 6/5/2025 0449  Gross per 24 hour   Intake 300 ml   Output 2725 ml   Net -2425 ml         +BM on 6/4    UO: 500mL/8hr         Recent Labs     06/03/25  0552 06/04/25  0545 06/05/25  0500   WBC 9.5 9.6 9.0   HGB 10.8* 10.7* 11.0*   HCT 33.8* 33.4* 34.1*    242 288      Recent Labs     06/03/25  0552 06/04/25  0545 06/05/25  0500   BUN 21 22 24*   CREATININE 0.8 0.9 0.9         Telemetry: SR        PE  Cardiac: RRR  Lungs: decreased bases  Chest incision C/D/I, approximated.  Sternum stable. Prior chest tube site incisions C/D/I, no erythema with intact sutures. Epicardial pacing wires present and secure.   Abd: Soft, nontender, +BS  Ext: Incisions C/D/I, approximated, no erythema, + LE edema, left radial artery harvest site C/D/I, approximated, no erythema, good , no further numbness or tingling to any fingers              A/P: POD# 7     1.  CAD  --Stable s/p Sternotomy/CABG x 2 (LIMA-LAD, Rad-OM)/Exploration RCA/ANGELA exclusion with 35mm AtriClip/Posterior pericardotomy/EVH RLE/Open LUE radial harvest/Rigid internal fixation of the sternum with [place] plates x 2 on 5/29/2025  --Post op ANDRESSA reveals  60% EF on inotropic support   --Post op TTE - EF 50-55% - no need for WCD   --Scr stable; 0.9  --(+)ASA/statin/BB - no plavix for all arterial grafts   --continue isordil 5mg TID for radial artery graft protection - to cont x 1 year   --reinforce sternal precautions- move in the tube protocol   --Epicardial pacing wires cut

## 2025-06-05 NOTE — PROGRESS NOTES
CLINICAL PHARMACY NOTE: MEDS TO BEDS    Total # of Prescriptions Filled:  12   The following medications were delivered to the patient:  Amiodarone 200 mg  Oxycodone-apap 5-325  Isosorbide dinitrate 5 mg  Midodrine 10 mg  Ferosul 325 mg  Atorvastatin calcium 40 mg  Magnesium oxide 400 mg  Docusate sodium 100 mg  Carvedilol 3.25 mg  Folic acid 1 mg  Pantoprazole sodium 40 mg    Additional Documentation: delivered to pt

## 2025-06-06 ENCOUNTER — TELEPHONE (OUTPATIENT)
Dept: CARDIOTHORACIC SURGERY | Age: 65
End: 2025-06-06

## 2025-06-06 RX ORDER — ALBUTEROL SULFATE 90 UG/1
2 INHALANT RESPIRATORY (INHALATION) EVERY 6 HOURS PRN
Qty: 18 G | Refills: 0 | Status: SHIPPED | OUTPATIENT
Start: 2025-06-06

## 2025-06-06 NOTE — TELEPHONE ENCOUNTER
This could be from his iron he is taking.  Okay to stop iron.  If persists please follow with PCP  Any Abd pain, or bloody stools he would need to go to ER    Will send other albuterol inhaler to outpatient pharmacy

## 2025-06-06 NOTE — DISCHARGE SUMMARY
Physician Discharge Summary     Patient ID:  Patrick Pickering  [id number]  64 y.o.  1960    Admit date: 5/29/2025    Discharge date and time: 6/5/2025  2:32 PM     Admitting Physician: Ernesto Westbrook MD     Discharge Physician: Ernesto Westbrook MD    Discharge Diagnoses:   POSTOPERATIVE DIAGNOSES: Severe multivessel coronary artery disease, history of multiple percutaneous coronary interventions, hypertension, hyperlipidemia, uncontrolled diabetes, multiple myocardial infarctions.     OPERATIONS:    1.  Sternotomy.  2.  Coronary artery bypass grafting x2, left internal mammary artery to the left anterior descending, radial artery to the obtuse marginal.  3. Exploration of the right coronary artery.  4. Left atrial appendage exclusion with 35 mm AtriClip.  5. Posterior pericardiotomy.  6. Endoscopic harvesting of the right lower extremity greater saphenous vein.  7. Open left upper extremity radial artery harvest.  8. Rigid internal fixation of the sternum with [place] plates x2.    Discharged Condition: stable    Indication for Admission:   Severe multivessel coronary artery disease, history of multiple percutaneous coronary interventions, hypertension, hyperlipidemia, uncontrolled diabetes, multiple myocardial infarctions.     Hospital Course: Course as above, and the patient underwent Sternotomy/CABG x 2 (LIMA-LAD, Rad-OM)/Exploration RCA/ANGELA exclusion with 35mm AtriClip/Posterior pericardotomy/EVH RLE/Open LUE radial harvest/Rigid internal fixation of the sternum with [place] plates x 2 on 5/29/2025. Postoperatively, he was transferred to the CVICU in guarded stable condition and extubated once indications met. Once appropriate, he was transferred to the monitored stepdown unit and beta blockade was initiated. Isordil was initiated and to continue for one year following surgery to prevent radial graft vasospasm. Midodrine was initiated as well to aid in weaning of vasopressor and addition of beta blockade and    glipiZIDE 5 MG tablet  Commonly known as: GLUCOTROL     Lantus SoloStar 100 UNIT/ML injection pen  Generic drug: insulin glargine     metFORMIN 1000 MG tablet  Commonly known as: GLUCOPHAGE     Mounjaro 5 MG/0.5ML Sopn SC injection  Generic drug: Tirzepatide     potassium chloride 20 MEQ extended release tablet  Commonly known as: KLOR-CON M  Take 1 tablet by mouth daily     Repatha SureClick Soaj pen  Generic drug: Evolocumab     Ventolin  (90 Base) MCG/ACT inhaler  Generic drug: albuterol sulfate HFA  Inhale 1 puff into the lungs 4 times daily as needed for Wheezing     vitamin D 1.25 MG (22653 UT) Caps capsule  Commonly known as: ERGOCALCIFEROL            STOP taking these medications      isosorbide mononitrate 30 MG extended release tablet  Commonly known as: IMDUR     lactulose 10 GM/15ML solution  Commonly known as: CHRONULAC     metoprolol succinate 25 MG extended release tablet  Commonly known as: TOPROL XL     nitroGLYCERIN 0.4 MG SL tablet  Commonly known as: Nitrostat            ASK your doctor about these medications      glimepiride 4 MG tablet  Commonly known as: AMARYL               Where to Get Your Medications        These medications were sent to [facility] - [place] [address] - P [phone] - F [phone]  [facility] [id number]      Phone: [phone]   amiodarone 200 MG tablet  ascorbic acid 500 MG tablet  atorvastatin 40 MG tablet  carvedilol 3.125 MG tablet  docusate sodium 100 MG capsule  ferrous sulfate 325 (65 Fe) MG tablet  folic acid 1 MG tablet  isosorbide dinitrate 5 MG tablet  magnesium oxide 400 (240 Mg) MG tablet  midodrine 10 MG tablet  oxyCODONE-acetaminophen 5-325 MG per tablet  pantoprazole 40 MG tablet       Activity: sternal precautions--move in the tube  Diet: cardiac diet  Wound Care: as directed    Follow-up with   Future Appointments   Date Time Provider Department Center   6/16/2025 11:15 AM

## 2025-06-06 NOTE — TELEPHONE ENCOUNTER
Jimmie chahal with [facility] called to report pt has been complaining of black stools since returning home from hospital yesterday. Nurse stated all vitals are normal, however pt left his Albuterol inhaler at the hospital.

## 2025-06-11 ENCOUNTER — CLINICAL DOCUMENTATION (OUTPATIENT)
Dept: CARDIAC REHAB | Age: 65
End: 2025-06-11

## 2025-06-16 ENCOUNTER — OFFICE VISIT (OUTPATIENT)
Age: 65
End: 2025-06-16
Payer: MEDICAID

## 2025-06-16 ENCOUNTER — TELEPHONE (OUTPATIENT)
Age: 65
End: 2025-06-16

## 2025-06-16 ENCOUNTER — HOSPITAL ENCOUNTER (OUTPATIENT)
Dept: GENERAL RADIOLOGY | Age: 65
Discharge: HOME | End: 2025-06-18
Payer: MEDICAID

## 2025-06-16 VITALS
BODY MASS INDEX: 31.05 KG/M2 | RESPIRATION RATE: 16 BRPM | SYSTOLIC BLOOD PRESSURE: 110 MMHG | HEART RATE: 72 BPM | DIASTOLIC BLOOD PRESSURE: 60 MMHG | OXYGEN SATURATION: 96 % | WEIGHT: 216.4 LBS

## 2025-06-16 DIAGNOSIS — I25.10 CAD IN NATIVE ARTERY: ICD-10-CM

## 2025-06-16 DIAGNOSIS — I50.33 ACUTE ON CHRONIC HEART FAILURE WITH PRESERVED EJECTION FRACTION (HCC): Primary | ICD-10-CM

## 2025-06-16 DIAGNOSIS — I25.10 CAD IN NATIVE ARTERY: Primary | ICD-10-CM

## 2025-06-16 DIAGNOSIS — I50.32 CHRONIC HEART FAILURE WITH PRESERVED EJECTION FRACTION (HCC): ICD-10-CM

## 2025-06-16 DIAGNOSIS — R07.81 RIB PAIN ON LEFT SIDE: ICD-10-CM

## 2025-06-16 DIAGNOSIS — R00.2 PALPITATIONS: ICD-10-CM

## 2025-06-16 LAB
ANION GAP SERPL CALCULATED.3IONS-SCNC: 11 MMOL/L (ref 7–16)
BUN BLDV-MCNC: 12 MG/DL (ref 8–23)
CALCIUM SERPL-MCNC: 8.9 MG/DL (ref 8.8–10.2)
CHLORIDE BLD-SCNC: 99 MMOL/L (ref 98–107)
CO2: 26 MMOL/L (ref 22–29)
CREAT SERPL-MCNC: 0.8 MG/DL (ref 0.7–1.2)
GFR, ESTIMATED: >90 ML/MIN/1.73M2
GLUCOSE BLD-MCNC: 140 MG/DL (ref 74–99)
NT PRO BNP: 1216 PG/ML (ref 0–125)
POTASSIUM SERPL-SCNC: 5 MMOL/L (ref 3.5–5.1)
SODIUM BLD-SCNC: 137 MMOL/L (ref 136–145)

## 2025-06-16 PROCEDURE — 36415 COLL VENOUS BLD VENIPUNCTURE: CPT | Performed by: NURSE PRACTITIONER

## 2025-06-16 PROCEDURE — G8427 DOCREV CUR MEDS BY ELIG CLIN: HCPCS | Performed by: NURSE PRACTITIONER

## 2025-06-16 PROCEDURE — 71046 X-RAY EXAM CHEST 2 VIEWS: CPT

## 2025-06-16 PROCEDURE — 3017F COLORECTAL CA SCREEN DOC REV: CPT | Performed by: NURSE PRACTITIONER

## 2025-06-16 PROCEDURE — 99214 OFFICE O/P EST MOD 30 MIN: CPT | Performed by: NURSE PRACTITIONER

## 2025-06-16 PROCEDURE — 99215 OFFICE O/P EST HI 40 MIN: CPT | Performed by: NURSE PRACTITIONER

## 2025-06-16 PROCEDURE — 3074F SYST BP LT 130 MM HG: CPT | Performed by: NURSE PRACTITIONER

## 2025-06-16 PROCEDURE — 1111F DSCHRG MED/CURRENT MED MERGE: CPT | Performed by: NURSE PRACTITIONER

## 2025-06-16 PROCEDURE — 3078F DIAST BP <80 MM HG: CPT | Performed by: NURSE PRACTITIONER

## 2025-06-16 PROCEDURE — PBSHW BRAIN NATRIURETIC PEPTIDE: Performed by: NURSE PRACTITIONER

## 2025-06-16 PROCEDURE — 4004F PT TOBACCO SCREEN RCVD TLK: CPT | Performed by: NURSE PRACTITIONER

## 2025-06-16 PROCEDURE — G8417 CALC BMI ABV UP PARAM F/U: HCPCS | Performed by: NURSE PRACTITIONER

## 2025-06-16 RX ORDER — FUROSEMIDE 10 MG/ML
40 INJECTION INTRAMUSCULAR; INTRAVENOUS ONCE
Status: DISCONTINUED | OUTPATIENT
Start: 2025-06-16 | End: 2025-06-16

## 2025-06-16 RX ORDER — SODIUM CHLORIDE 0.9 % (FLUSH) 0.9 %
5-40 SYRINGE (ML) INJECTION ONCE
Status: DISCONTINUED | OUTPATIENT
Start: 2025-06-16 | End: 2025-06-16

## 2025-06-16 NOTE — PROGRESS NOTES
Congestive Heart Failure Clinic   [facility] [place]       Reason for Visit: Heart Failure     Primary Cardiologist: Dr. Boston Flowers       History of Present Illness:     Mr. Pickering is a 64 year old male with a PMHx of chronic HFimpEF, ischemic cardiomyopathy, CAD s/p CABG, T2DM, HLD, COPD with tobacco abuse, obesity.             Patient Active Problem List    Diagnosis Date Noted    Coronary artery disease involving native coronary artery of native heart with other form of angina pectoris 05/29/2025    Postoperative hypotension 05/29/2025    Complication of surgical procedure 05/29/2025    Status post coronary artery bypass graft 05/29/2025    Preop testing 05/22/2025    Coronary artery disease 05/13/2025    Abnormal stress test 05/06/2025    Chest pain 10/27/2018    Tobacco abuse 10/27/2018    Noncompliance with medications 10/27/2018    HTN (hypertension)     Hyperlipemia     Type 2 diabetes mellitus, without long-term current use of insulin ([facility])     CAD (coronary artery disease)            Past Medical History:   Diagnosis Date    CAD (coronary artery disease)     6 stents. Details unavailible    DM (diabetes mellitus) ([facility])     HTN (hypertension)     Hyperlipemia     MI (myocardial infarction) ([facility]) 2005/2010    stents 6           Past Surgical History:   Procedure Laterality Date    CARDIAC PROCEDURE N/A 5/6/2025    Left heart cath / coronary angiography performed by Veronika Edmonds MD at [facility] CARDIAC CATH LAB    CORONARY ANGIOPLASTY WITH STENT PLACEMENT  10/27/2018    Dr. Zaragoza- [facility] ALFREDO 2.25x38 Mid LAD, [facility] ALFREDO 2.5x18 Prox LAD    CORONARY ARTERY BYPASS GRAFT Left 5/29/2025    CORONARY ARTERY BYPASS GRAFTING, RADIAL ARTERY HARVEST performed by Ernesto Westbrook MD at [facility] OR    KNEE SURGERY  1978           No Known Allergies        Current Outpatient Medications:     albuterol sulfate HFA (PROVENTIL;VENTOLIN;PROAIR) 108 (90 Base) MCG/ACT inhaler, Inhale 2 puffs

## 2025-06-16 NOTE — PATIENT INSTRUCTIONS
Get blood work and will give IV lasix in CHF clinic today  Once we review your blood work we will call with any medication changes  Called [facility] while patient was in clinic, spoke with LYUBOV Guillen. 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 No acute intervention needed, will get CXR and patient to follow up with [facility] as scheduled.    Will apply Zio monitor to evaluate palpitations   Please elevate your legs as much as possible and apply compression stockings on in AM and off in PM  Follow up in CHF clinic in 1 week as scheduled   Follow up with Dr. Flowers in 3 months  Weigh yourself daily    -Stay Hydrated, 64 oz fluid daily    -Diet should be sodium restricted to 2 grams    -Again watch your daily weight trends and if you gain water weight please follow below instructions.    -If you gain 3-5 pounds in 2-3 days OR notice that you are retaining fluid in anyway just like you did before then take an extra dose of your water pill (furosemide/Lasix) every day until you lose the weight or feel better.     -If you notice that you have taken more than 2 extra doses in 1 week then please call and let us know.    -If at any time you feel that you are retaining fluid, your medications are not working, or you feel ill in anyway, then please call us for either same day appointment or the next day, and for instructions. Our goal is to keep you out of the emergency room and the hospital and we have ways to do it. You just need to call us in a timely manner.     -If you become sick for other reasons, and notice that you are not urinating as much, the urine is very dark, you have significant diarrhea or vomiting, then please DO NOT take your water pill and CALL US immediately.

## 2025-06-17 ENCOUNTER — RESULTS FOLLOW-UP (OUTPATIENT)
Age: 65
End: 2025-06-17

## 2025-06-17 RX ORDER — FUROSEMIDE 40 MG/1
40 TABLET ORAL 2 TIMES DAILY
Qty: 60 TABLET | Refills: 5 | Status: SHIPPED | OUTPATIENT
Start: 2025-06-17

## 2025-06-17 NOTE — RESULT ENCOUNTER NOTE
Labs reviewed  Patient left CHF clinic prior to receiving IV diuretics  Please have him increase lasix to 40 mg twice daily ( morning and afternoon, 6 hours apart)  Return to CHF clinic next week as scheduled - sooner if needed    Thank you

## 2025-06-17 NOTE — TELEPHONE ENCOUNTER
----- Message from LYUBOV Pedraza CNP sent at 6/17/2025 12:04 PM EDT -----  Labs reviewed  Patient left CHF clinic prior to receiving IV diuretics  Please have him increase lasix to 40 mg twice daily ( morning and afternoon, 6 hours apart)  Return to CHF clinic next week as scheduled - sooner if needed    Thank you

## 2025-06-17 NOTE — TELEPHONE ENCOUNTER
Spoke with patient informing of instructions below per PABLO Pedraza    Patient verbalized understanding.

## 2025-06-21 PROBLEM — Z01.818 PREOP TESTING: Status: RESOLVED | Noted: 2025-05-22 | Resolved: 2025-06-21

## 2025-06-23 ENCOUNTER — HOSPITAL ENCOUNTER (OUTPATIENT)
Dept: OTHER | Age: 65
Setting detail: THERAPIES SERIES
Discharge: HOME | End: 2025-06-23
Payer: MEDICAID

## 2025-06-23 ENCOUNTER — RESULTS FOLLOW-UP (OUTPATIENT)
Age: 65
End: 2025-06-23

## 2025-06-23 VITALS
BODY MASS INDEX: 30.42 KG/M2 | DIASTOLIC BLOOD PRESSURE: 69 MMHG | OXYGEN SATURATION: 95 % | RESPIRATION RATE: 16 BRPM | WEIGHT: 212 LBS | SYSTOLIC BLOOD PRESSURE: 118 MMHG | HEART RATE: 67 BPM

## 2025-06-23 LAB
ANION GAP SERPL CALCULATED.3IONS-SCNC: 11 MMOL/L (ref 7–16)
BNP SERPL-MCNC: 727 PG/ML (ref 0–125)
BUN SERPL-MCNC: 16 MG/DL (ref 6–23)
CALCIUM SERPL-MCNC: 9.5 MG/DL (ref 8.6–10.2)
CHLORIDE SERPL-SCNC: 99 MMOL/L (ref 98–107)
CO2 SERPL-SCNC: 26 MMOL/L (ref 22–29)
CREAT SERPL-MCNC: 1 MG/DL (ref 0.7–1.2)
GFR, ESTIMATED: >90 ML/MIN/1.73M2
GLUCOSE SERPL-MCNC: 183 MG/DL (ref 74–99)
LEFT VENTRICULAR EJECTION FRACTION HIGH VALUE: 55 %
LV EF: 50 %
POTASSIUM SERPL-SCNC: 4.6 MMOL/L (ref 3.5–5)
SODIUM SERPL-SCNC: 136 MMOL/L (ref 132–146)

## 2025-06-23 PROCEDURE — 6360000002 HC RX W HCPCS

## 2025-06-23 PROCEDURE — 99204 OFFICE O/P NEW MOD 45 MIN: CPT

## 2025-06-23 PROCEDURE — 2500000003 HC RX 250 WO HCPCS: Performed by: NURSE PRACTITIONER

## 2025-06-23 PROCEDURE — 96374 THER/PROPH/DIAG INJ IV PUSH: CPT

## 2025-06-23 PROCEDURE — 36415 COLL VENOUS BLD VENIPUNCTURE: CPT

## 2025-06-23 PROCEDURE — 83880 ASSAY OF NATRIURETIC PEPTIDE: CPT

## 2025-06-23 PROCEDURE — 80048 BASIC METABOLIC PNL TOTAL CA: CPT

## 2025-06-23 RX ORDER — SODIUM CHLORIDE 0.9 % (FLUSH) 0.9 %
10 SYRINGE (ML) INJECTION ONCE
Status: COMPLETED | OUTPATIENT
Start: 2025-06-23 | End: 2025-06-23

## 2025-06-23 RX ORDER — MIDODRINE HYDROCHLORIDE 5 MG/1
5 TABLET ORAL
Qty: 90 TABLET | Refills: 3 | Status: SHIPPED | OUTPATIENT
Start: 2025-06-23 | End: 2025-07-01 | Stop reason: HOSPADM

## 2025-06-23 RX ORDER — FUROSEMIDE 10 MG/ML
40 INJECTION INTRAMUSCULAR; INTRAVENOUS ONCE
Status: COMPLETED | OUTPATIENT
Start: 2025-06-23 | End: 2025-06-23

## 2025-06-23 RX ORDER — FUROSEMIDE 10 MG/ML
INJECTION INTRAMUSCULAR; INTRAVENOUS
Status: COMPLETED
Start: 2025-06-23 | End: 2025-06-23

## 2025-06-23 RX ADMIN — FUROSEMIDE 40 MG: 10 INJECTION, SOLUTION INTRAMUSCULAR; INTRAVENOUS at 10:26

## 2025-06-23 RX ADMIN — SODIUM CHLORIDE, PRESERVATIVE FREE 10 ML: 5 INJECTION INTRAVENOUS at 10:26

## 2025-06-23 RX ADMIN — FUROSEMIDE 40 MG: 10 INJECTION INTRAMUSCULAR; INTRAVENOUS at 10:26

## 2025-06-23 NOTE — PROGRESS NOTES
ASSESSMENT/PLAN:    [] Euvolemic          [x] MILDLY Hypervolemic, with increase from baseline:  [] Shortness of breath/MARTINO  [] JVD  [] HJR  [] Abnormal lung assessment:   [] Orthopnea  [] PND  [] Decreased urinary response to oral diuretic   [] Scrotal swelling   [x] Lower extremity edema  [] Compression stockings provided  [] Decline in functional capacity (unable to perform activities they had previously been able to do)  [] Weight gain     [x] IV diuretics given IV LASIX 40 mg  [] Provider notified of recurrent IV diuretic use    Additional Notes:     HFimpEF 50-55%. Recent ACB with Dr. Westbrook on 5/29/25. On midodrine for BP support.   Received IV diuretics at visit from 6/16/25---  weight has decreased 5 lbs since receiving. Still mildly hypervolemic with 2+ BLE edema although volume status has improved. 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    Agreeable for IVP diuretic today.    Breathing at baseline.                       [x]Lab work obtained    [x] Patient/Family Educated On:  [x] HF zones (Green, Yellow, Red) and aware of when to take action   [x] Daily weights  [] Scale provided   [x] Low sodium diet (2000 mg)  Barriers to compliance  [] Refuses to monitor diet  [] Socioeconomic difficulties  [] Unable to cook for self (use of frozen meals, can goods, etc)  [] CHF CHW consulted  [] Low sodium meal delivery options given to patient  [] Dietician consulted   [] Low sodium recipes provided  [] Sodium free seasoning provided   [x] Fluid intake 6-8 cups (around 64 oz)  [x] Reviewed currently prescribed medications with patient, educated on importance of compliance and answered any questions regarding their medication  [] Pill box provided to patient  [] Patient using pill packing pharmacy   [] CPAP/BiPAP use  [] Low impact exercise / cardiac rehab   [] LifeVest use  [x] Patient aware of signs and symptoms of worsening HF,

## 2025-06-24 ENCOUNTER — TELEPHONE (OUTPATIENT)
Dept: OTHER | Age: 65
End: 2025-06-24

## 2025-06-24 NOTE — TELEPHONE ENCOUNTER
12:06 PM  Spoke with patient re: medication adjustment made per Adilene SHAH.     Decrease midodrine to 5 mg TID  Follow up in CHF clinic in 1 week as scheduled      I have reviewed the provider's instructions with the patient, answering all questions to his satisfaction.        Electronically signed by Anayeli Ferraro RN on 6/24/2025 at 12:08 PM

## 2025-07-01 ENCOUNTER — HOSPITAL ENCOUNTER (OUTPATIENT)
Dept: OTHER | Age: 65
Setting detail: THERAPIES SERIES
Discharge: HOME | End: 2025-07-01
Payer: MEDICAID

## 2025-07-01 ENCOUNTER — TELEPHONE (OUTPATIENT)
Dept: OTHER | Age: 65
End: 2025-07-01

## 2025-07-01 ENCOUNTER — RESULTS FOLLOW-UP (OUTPATIENT)
Age: 65
End: 2025-07-01

## 2025-07-01 VITALS
DIASTOLIC BLOOD PRESSURE: 70 MMHG | OXYGEN SATURATION: 95 % | HEART RATE: 73 BPM | WEIGHT: 211 LBS | SYSTOLIC BLOOD PRESSURE: 122 MMHG | BODY MASS INDEX: 30.28 KG/M2

## 2025-07-01 DIAGNOSIS — I50.32 HEART FAILURE WITH IMPROVED EJECTION FRACTION (HFIMPEF) (HCC): Primary | ICD-10-CM

## 2025-07-01 LAB
ANION GAP SERPL CALCULATED.3IONS-SCNC: 12 MMOL/L (ref 7–16)
BNP SERPL-MCNC: 399 PG/ML (ref 0–125)
BUN SERPL-MCNC: 13 MG/DL (ref 8–23)
CHLORIDE SERPL-SCNC: 102 MMOL/L (ref 98–107)
CO2 SERPL-SCNC: 23 MMOL/L (ref 22–29)
CREAT SERPL-MCNC: 0.7 MG/DL (ref 0.7–1.2)
GFR, ESTIMATED: >90 ML/MIN/1.73M2
GLUCOSE SERPL-MCNC: 271 MG/DL (ref 74–99)
POTASSIUM SERPL-SCNC: 4.2 MMOL/L (ref 3.5–5.1)

## 2025-07-01 PROCEDURE — 83880 ASSAY OF NATRIURETIC PEPTIDE: CPT

## 2025-07-01 PROCEDURE — 36415 COLL VENOUS BLD VENIPUNCTURE: CPT

## 2025-07-01 PROCEDURE — 99214 OFFICE O/P EST MOD 30 MIN: CPT

## 2025-07-01 PROCEDURE — 80048 BASIC METABOLIC PNL TOTAL CA: CPT

## 2025-07-01 RX ORDER — FUROSEMIDE 40 MG/1
40 TABLET ORAL DAILY
Qty: 30 TABLET | Refills: 3 | Status: SHIPPED | OUTPATIENT
Start: 2025-07-01

## 2025-07-01 NOTE — RESULT ENCOUNTER NOTE
Labs and CHF Clinic note reviewed    HFimpEF, ischemic cardiomyopathy with LVEF 50 to 55% per TTE 6/3/2025 compared to 40 to 45% per TTE 4/11/2025  S/p CABG x 2 on 5/29/2025.    Midodrine decreased to 5 mg 3 times daily on 6/23/2025    Euvolemic in clinic today weight 211 pounds today compared to 212 pounds at visit on 6/23/2025    Wt Readings from Last 3 Encounters:  07/01/25 : 95.7 kg (211 lb)  06/23/25 : 96.2 kg (212 lb)  06/16/25 : 98.2 kg (216 lb 6.4 oz)    BP Readings from Last 1 Encounters:  07/01/25 : 122/70    Pulse Readings from Last 1 Encounters:  07/01/25 : 73      Current GDMT:  Coreg 3.125 mg p.o. twice daily  Lasix 40 mg daily--patient was prescribed 40 mg twice daily on 6/17/2025 but reported taking only 40 mg daily at visit today  Isordil 5 mg p.o. 3 times daily  Potassium chloride 20 mEq p.o. daily    Also on:  Midodrine 5 mg p.o. 3 times daily    Patient brought BP log with him today and systolic blood pressure all in the 110s range.    NT proBNP 399 compared to 727 on 6/23/2025 and 1216 on 6/16/2025  BMP shows stable renal function and electrolytes compared to prior.  Creatinine 0.7.  Potassium 4.2.      1.  Discontinue midodrine  2.  Continue to check BP at home.  Notify clinic if  mmHg or below prior to taking medications  3.  Add Jardiance 10 mg p.o. daily  4.  Continue Lasix 40 mg daily  5.  Check BMP in 2 weeks following addition of Jardiance  6.  Follow-up in CHF clinic on 7/23/2025 as scheduled

## 2025-07-01 NOTE — TELEPHONE ENCOUNTER
4:25 PM   Spoke with patient re: medication adjustment per Harika FITZPATRICK.     -Discontinue midodrine  Continue to check BP at home.  Notify clinic if  mmHg or below prior to taking medications  Add Jardiance 10 mg p.o. daily  Continue Lasix 40 mg daily  Check BMP in 2 weeks following addition of Jardiance  Follow-up in CHF clinic on 7/23/2025 as scheduled        I have reviewed the provider's instructions with the patient, answering all questions to his satisfaction.        Electronically signed by Anayeli Ferraro RN on 7/1/2025 at 4:27 PM

## 2025-07-01 NOTE — PROGRESS NOTES
use  [x] Patient aware of signs and symptoms of worsening HF, CHF clinic phone number provided and made aware to call clinic for sooner if evaluation if needed     [] Difficulty affording medications  [] CHF CHW consulted  [] Prescription assistance information given   [] [facility] medication assistance program information given   [] Sample medications provided to patient to help bridge gap until affordability N/A          Scheduled to follow up in CHF clinic on:   Future Appointments   Date Time Provider Department Center   7/8/2025 10:30 AM Ernesto Westbrook MD CARDIO SURG [facility]   7/23/2025  7:30 AM [facility] ROOM 4 [facility]   10/2/2025 12:45 PM Boston Flowers, DO [facility]

## 2025-07-02 ENCOUNTER — TELEPHONE (OUTPATIENT)
Dept: OTHER | Age: 65
End: 2025-07-02

## 2025-07-02 NOTE — PROGRESS NOTES
Cardiothoracic Surgery       CC: S/P CABG post-operative follow up visit      HPI:  Patrick Pickering is a 64 y.o. male whom presents to the office today for routine post-operative follow-up status post:Sternotomy/CABG x 2 (LIMA-LAD, Rad-OM)/Exploration RCA/ANGELA exclusion with 35mm AtriClip/Posterior pericardotomy/EVH RLE/Open LUE radial harvest/Rigid internal fixation of the sternum with [place] plates x 2 on 5/29/2025. He was discharged home on POD 7. There have been no readmissions since discharge.  Currently, there are no complaints of any CP, SOB. He reports area left of sternum with swelling and pain.     Current Outpatient Medications   Medication Instructions    albuterol sulfate HFA (PROVENTIL;VENTOLIN;PROAIR) 108 (90 Base) MCG/ACT inhaler 2 puffs, Inhalation, EVERY 6 HOURS PRN    amiodarone (CORDARONE) 400 mg, Oral, SEE ADMIN INSTRUCTIONS, Take 400 mg orally twice daily for seven days, then take 200 mg orally twice daily for seven days, then take 200 mg orally daily for fourteen days, then discontinue    ascorbic acid (VITAMIN C) 500 mg, Oral, 2 TIMES DAILY    aspirin EC 81 mg, DAILY    atorvastatin (LIPITOR) 40 mg, Oral, NIGHTLY    carvedilol (COREG) 3.125 mg, Oral, 2 TIMES DAILY WITH MEALS    Continuous Glucose Sensor (FREESTYLE YURIDIA 3 PLUS SENSOR) MISC CHANGE SENSOR EVER 15 DAYS    docusate sodium (COLACE) 100 mg, Oral, 2 TIMES DAILY PRN    empagliflozin (JARDIANCE) 10 mg, Oral, DAILY    folic acid (FOLVITE) 1 mg, Oral, DAILY    furosemide (LASIX) 40 mg, Oral, DAILY    glipiZIDE (GLUCOTROL) 5 MG tablet TAKE 1 TABLET BY MOUTH TWICE DAILY (BEFORE BREAKFAST AND BEFORE DINNER)    isosorbide dinitrate (ISORDIL) 5 mg, Oral, 3 TIMES DAILY, Take for ONE YEAR following surgery    LANTUS SOLOSTAR 100 UNIT/ML injection pen INJECT 20 UNITS SUBCUTANEOUSLY ONCE DAILY IN THE MORNING    magnesium oxide (MAG-OX) 400 mg, Oral, 2 TIMES

## 2025-07-08 ENCOUNTER — OFFICE VISIT (OUTPATIENT)
Dept: CARDIOTHORACIC SURGERY | Age: 65
End: 2025-07-08

## 2025-07-08 VITALS
OXYGEN SATURATION: 95 % | HEIGHT: 70 IN | HEART RATE: 73 BPM | SYSTOLIC BLOOD PRESSURE: 118 MMHG | BODY MASS INDEX: 29.35 KG/M2 | DIASTOLIC BLOOD PRESSURE: 73 MMHG | WEIGHT: 205 LBS

## 2025-07-08 DIAGNOSIS — Z95.1 S/P CABG (CORONARY ARTERY BYPASS GRAFT): Primary | ICD-10-CM

## 2025-07-08 PROCEDURE — 99024 POSTOP FOLLOW-UP VISIT: CPT | Performed by: THORACIC SURGERY (CARDIOTHORACIC VASCULAR SURGERY)

## 2025-07-11 ENCOUNTER — TELEPHONE (OUTPATIENT)
Dept: OTHER | Age: 65
End: 2025-07-11

## 2025-07-11 DIAGNOSIS — I25.10 CAD IN NATIVE ARTERY: ICD-10-CM

## 2025-07-11 DIAGNOSIS — I50.33 ACUTE ON CHRONIC HEART FAILURE WITH PRESERVED EJECTION FRACTION (HCC): ICD-10-CM

## 2025-07-11 DIAGNOSIS — R00.2 PALPITATIONS: ICD-10-CM

## 2025-07-11 NOTE — TELEPHONE ENCOUNTER
Spoke with patient re:       Adilene Mckeon, APRN - CNP  Anayeli Ferraro, RN  Please let patient know that event monitor with no significant arrhthymias         Electronically signed by Anayeli Ferraro RN on 7/11/2025 at 2:41 PM

## 2025-07-19 ENCOUNTER — TELEPHONE (OUTPATIENT)
Dept: CARDIAC REHAB | Age: 65
End: 2025-07-19

## 2025-07-22 DIAGNOSIS — Z95.1 S/P CABG X 2: Primary | ICD-10-CM

## 2025-07-23 ENCOUNTER — RESULTS FOLLOW-UP (OUTPATIENT)
Age: 65
End: 2025-07-23

## 2025-07-23 ENCOUNTER — TELEPHONE (OUTPATIENT)
Dept: OTHER | Age: 65
End: 2025-07-23

## 2025-07-23 ENCOUNTER — HOSPITAL ENCOUNTER (OUTPATIENT)
Dept: OTHER | Age: 65
Setting detail: THERAPIES SERIES
Discharge: HOME | End: 2025-07-23
Payer: MEDICAID

## 2025-07-23 VITALS
OXYGEN SATURATION: 95 % | DIASTOLIC BLOOD PRESSURE: 67 MMHG | BODY MASS INDEX: 28.81 KG/M2 | WEIGHT: 200.8 LBS | HEART RATE: 81 BPM | RESPIRATION RATE: 16 BRPM | SYSTOLIC BLOOD PRESSURE: 100 MMHG

## 2025-07-23 LAB
ANION GAP SERPL CALCULATED.3IONS-SCNC: 12 MMOL/L (ref 7–16)
BNP SERPL-MCNC: 238 PG/ML (ref 0–125)
BUN SERPL-MCNC: 11 MG/DL (ref 8–23)
CALCIUM SERPL-MCNC: 9 MG/DL (ref 8.8–10.2)
CHLORIDE SERPL-SCNC: 102 MMOL/L (ref 98–107)
CO2 SERPL-SCNC: 21 MMOL/L (ref 22–29)
CREAT SERPL-MCNC: 1 MG/DL (ref 0.7–1.2)
GFR, ESTIMATED: 82 ML/MIN/1.73M2
GLUCOSE SERPL-MCNC: 298 MG/DL (ref 74–99)
POTASSIUM SERPL-SCNC: 4.4 MMOL/L (ref 3.5–5.1)
SODIUM SERPL-SCNC: 136 MMOL/L (ref 136–145)

## 2025-07-23 PROCEDURE — 83880 ASSAY OF NATRIURETIC PEPTIDE: CPT

## 2025-07-23 PROCEDURE — 36415 COLL VENOUS BLD VENIPUNCTURE: CPT

## 2025-07-23 PROCEDURE — 99214 OFFICE O/P EST MOD 30 MIN: CPT

## 2025-07-23 PROCEDURE — 80048 BASIC METABOLIC PNL TOTAL CA: CPT

## 2025-07-23 RX ORDER — SPIRONOLACTONE 25 MG/1
12.5 TABLET ORAL
Qty: 15 TABLET | Refills: 3 | Status: SHIPPED | OUTPATIENT
Start: 2025-07-23

## 2025-07-23 RX ORDER — FUROSEMIDE 20 MG/1
20 TABLET ORAL DAILY
Qty: 30 TABLET | Refills: 5 | Status: SHIPPED | OUTPATIENT
Start: 2025-07-23

## 2025-07-23 NOTE — RESULT ENCOUNTER NOTE
Labs and CHF Clinic note reviewed    HFimpEF, LVEF 50 to 55% per TTE on 6/3/2025 compared to 40 to 45% per TTE on 4/11/2025  S/p CABG x 2 on 5/29/2025.    Jardiance added following CHF clinic visit on 7/1/2025 and midodrine discontinued at that time.    Euvolemic on exam in clinic today.  Weight is 200 pounds and 13 ounces today compared to 211 pounds at last visit on 7/1/2025.    Wt Readings from Last 3 Encounters:  07/23/25 : 91.1 kg (200 lb 12.8 oz)  07/08/25 : 93 kg (205 lb)  07/01/25 : 95.7 kg (211 lb)    BP Readings from Last 1 Encounters:  07/23/25 : 100/67    Pulse Readings from Last 1 Encounters:  07/23/25 : 81      Current GDMT:  Coreg 3.125 mg p.o. twice daily  Jardiance 10 mg p.o. daily  Lasix 40 mg p.o. daily  Isordil 5 mg p.o. 3 times daily  Potassium chloride 20 mEq p.o. daily    Was on tapering dose of amiodarone post surgery but has completed course    BMP shows stable renal function electrolytes compared to prior.  Creatinine 1.0 with BUN of 11 and GFR 82 compared to 0.7, 13 and greater than 90 on 7/1/2025 and 1.0, 16 and greater than 90 on 6/23/2025.  Potassium 4.4 but specimen hemolyzed  NT proBNP 238 compared to 399 on 7/1/2025 and 727 on 6/23/2025    1.  Decrease Lasix to 20 mg daily (from 40mg daily)--may take 1/2 tab of 40mg tablet daily until current script runs out and new script will be sent for 20mg tablet daily  2.  Add Aldactone 12.5mg daily with lunch       --Advise to HOLD for SBP<100mmHg  3.  STOP potassium chloride  4.  Check BMP in 1 week following addition of Aldactone  5.  Is patient still checking his blood pressure at home?  If so what his his BP been running.  Did patient take morning medications prior to visit today?  Please advise him to continue checking blood pressure twice daily and bring log with him to next appointment  6.  Follow-up with CHF clinic on 8/25/2025 as scheduled or sooner if needed

## 2025-07-23 NOTE — TELEPHONE ENCOUNTER
3:52 PM spoke with Jose regarding medication adjustment, blood pressures , and labs     18   -  101/75  19    -  112/78  20    -  103/78  21     - 112/82  22       -113/79    Medication taken this am.        I have reviewed the provider's instructions with the patient, answering all questions to his satisfaction.            Labs and CHF Clinic note reviewed     HFimpEF, LVEF 50 to 55% per TTE on 6/3/2025 compared to 40 to 45% per TTE on 4/11/2025  S/p CABG x 2 on 5/29/2025.     Jardiance added following CHF clinic visit on 7/1/2025 and midodrine discontinued at that time.     Euvolemic on exam in clinic today.  Weight is 200 pounds and 13 ounces today compared to 211 pounds at last visit on 7/1/2025.     Wt Readings from Last 3 Encounters:  07/23/25 : 91.1 kg (200 lb 12.8 oz)  07/08/25 : 93 kg (205 lb)  07/01/25 : 95.7 kg (211 lb)     BP Readings from Last 1 Encounters:  07/23/25 : 100/67     Pulse Readings from Last 1 Encounters:  07/23/25 : 81        Current GDMT:  Coreg 3.125 mg p.o. twice daily  Jardiance 10 mg p.o. daily  Lasix 40 mg p.o. daily  Isordil 5 mg p.o. 3 times daily  Potassium chloride 20 mEq p.o. daily     Was on tapering dose of amiodarone post surgery but has completed course     BMP shows stable renal function electrolytes compared to prior.  Creatinine 1.0 with BUN of 11 and GFR 82 compared to 0.7, 13 and greater than 90 on 7/1/2025 and 1.0, 16 and greater than 90 on 6/23/2025.  Potassium 4.4 but specimen hemolyzed  NT proBNP 238 compared to 399 on 7/1/2025 and 727 on 6/23/2025     1.  Decrease Lasix to 20 mg daily (from 40mg daily)--may take 1/2 tab of 40mg tablet daily until current script runs out and new script will be sent for 20mg tablet daily  2.  Add Aldactone 12.5mg daily with lunch       --Advise to HOLD for SBP<100mmHg  3.  STOP potassium chloride  4.  Check BMP in 1 week following addition of Aldactone  5.  Is patient still checking his blood pressure at home?  If so what his his BP

## 2025-07-23 NOTE — PROGRESS NOTES
LYUBOV Angeles CNP   atorvastatin (LIPITOR) 40 MG tablet Take 1 tablet by mouth nightly Yes Mildred Jin APRN - CNP   carvedilol (COREG) 3.125 MG tablet Take 1 tablet by mouth 2 times daily (with meals) Yes Mildred Jin APRN - CNP   glipiZIDE (GLUCOTROL) 5 MG tablet TAKE 1 TABLET BY MOUTH TWICE DAILY (BEFORE BREAKFAST AND BEFORE DINNER)  Patient taking differently: Take 0.5 tablets by mouth daily Yes ProviderJohnny MD   LANUSHA SOLOSTAR 100 UNIT/ML injection pen INJECT 20 UNITS SUBCUTANEOUSLY ONCE DAILY IN THE MORNING  Patient taking differently: 12 Units Yes Provider, MD Johnny   Continuous Glucose Sensor (FREESTYLE YURIDIA 3 PLUS SENSOR) MISC CHANGE SENSOR EVER 15 DAYS Yes Provider, MD Johnny   REPATHFRANCESCA SURECLICK 140 MG/ML SOAJ every 14 days Yes Provider, Historical, MD   MOUNJARO 5 MG/0.5ML SOPN SC injection 12.5 mg Yes Provider, MD Johnny   VENTOLIN  (90 Base) MCG/ACT inhaler Inhale 1 puff into the lungs 4 times daily as needed for Wheezing Yes Boston Flowers DO   aspirin EC 81 MG EC tablet Take 1 tablet by mouth daily Yes Provider, MD Johnny   furosemide (LASIX) 20 MG tablet Take 1 tablet by mouth daily  Harika Russell PA   spironolactone (ALDACTONE) 25 MG tablet Take 0.5 tablets by mouth Daily with lunch HOLD for SBP<100mmHg  Harika Russell PA   magnesium oxide (MAG-OX) 400 (240 Mg) MG tablet Take 1 tablet by mouth 2 times daily for 14 days  Patient not taking: Reported on 7/23/2025  Mildred Jin APRN - CNP   pantoprazole (PROTONIX) 40 MG tablet Take 1 tablet by mouth every morning (before breakfast) for 14 days  Patient not taking: Reported on 7/23/2025  Mildred Jin APRN - CNP          GUIDELINE DIRECTED MEDICAL THERAPY for HFmrEF / HFrEF / HFimpEF:  ARNI/ACE I/ARB: (1a indication)  No  Hydralazine/Nitrates (1a in symptomatic AA population, 2b if unable to tolerate ACE/ARB/ARNI)  No  Beta blocker: (1a indication)  Carvedilol (Coreg)  Aldosterone

## 2025-07-28 ENCOUNTER — TELEPHONE (OUTPATIENT)
Age: 65
End: 2025-07-28

## 2025-07-29 ENCOUNTER — TELEPHONE (OUTPATIENT)
Age: 65
End: 2025-07-29

## 2025-07-29 NOTE — TELEPHONE ENCOUNTER
----- Message from KENTON PEÑA RN sent at 7/23/2025  3:57 PM EDT -----  Patient took am medications before appointment     Blood pressure   18 101/75  19  112/78  20   103/78     21   112/82   22   113/79

## 2025-07-31 ENCOUNTER — RESULTS FOLLOW-UP (OUTPATIENT)
Age: 65
End: 2025-07-31

## 2025-07-31 ENCOUNTER — HOSPITAL ENCOUNTER (OUTPATIENT)
Dept: LAB | Age: 65
Discharge: HOME | End: 2025-07-31
Payer: MEDICAID

## 2025-07-31 DIAGNOSIS — I50.32 HEART FAILURE WITH IMPROVED EJECTION FRACTION (HFIMPEF) (HCC): ICD-10-CM

## 2025-07-31 LAB
ANION GAP SERPL CALCULATED.3IONS-SCNC: 11 MMOL/L (ref 7–16)
BUN SERPL-MCNC: 11 MG/DL (ref 8–23)
CALCIUM SERPL-MCNC: 9.3 MG/DL (ref 8.8–10.2)
CHLORIDE SERPL-SCNC: 105 MMOL/L (ref 98–107)
CO2 SERPL-SCNC: 25 MMOL/L (ref 22–29)
CREAT SERPL-MCNC: 1 MG/DL (ref 0.7–1.2)
GFR, ESTIMATED: 86 ML/MIN/1.73M2
GLUCOSE SERPL-MCNC: 129 MG/DL (ref 74–99)
POTASSIUM SERPL-SCNC: 4.3 MMOL/L (ref 3.5–5.1)
SODIUM SERPL-SCNC: 141 MMOL/L (ref 136–145)

## 2025-07-31 PROCEDURE — 36415 COLL VENOUS BLD VENIPUNCTURE: CPT

## 2025-07-31 PROCEDURE — 80048 BASIC METABOLIC PNL TOTAL CA: CPT

## 2025-07-31 NOTE — RESULT ENCOUNTER NOTE
Labs reviewed    HFimpEF, LVEF 50 to 55% per TTE on 6/3/2025 compared to 40 to 45% per TTE on 4/11/2025  S/p CABG x 2 on 5/29/2025.    Following CHF clinic visit on 7/23/2025, patient was added on Aldactone 12.5 mg daily with lunch and his Lasix was decreased to 20 mg daily from 40 mg daily.  Potassium chloride was discontinued.    Current GDMT:  Coreg 3.125 mg p.o. twice daily  Jardiance 10 mg p.o. daily  Lasix 20 mg p.o. daily  Isordil 5 mg p.o. 3 times daily  Aldactone 12.5 mg p.o. daily    BMP shows stable renal function and electrolytes compared to prior with creatinine 1.0, BUN 11 and GFR 86.  Potassium stable at 4.3.    1.  BMP shows stable renal function and electrolytes following recent addition of Aldactone and reduction in Lasix dose  2.  Continue cardiac meds the same  3.  Consider addition of ACE inhibitor/ARB in future if BP tolerates  4.  Follow-up in CHF clinic on 8/25/2025 as scheduled

## 2025-08-01 NOTE — TELEPHONE ENCOUNTER
Spoke with patient informing of instructions below per NANETTE Kaba    Patient verbalized understanding.

## 2025-08-01 NOTE — TELEPHONE ENCOUNTER
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    Following CHF clinic visit on 7/23/2025, patient was added on Aldactone 12.5 mg daily with lunch and his Lasix was decreased to 20 mg daily from 40 mg daily.  Potassium chloride was discontinued.    Current GDMT:  Coreg 3.125 mg p.o. twice daily  Jardiance 10 mg p.o. daily  Lasix 20 mg p.o. daily  Isordil 5 mg p.o. 3 times daily  Aldactone 12.5 mg p.o. daily    BMP shows stable renal function and electrolytes compared to prior with creatinine 1.0, BUN 11 and GFR 86.  Potassium stable at 4.3.    1.  BMP shows stable renal function and electrolytes following recent addition of Aldactone and reduction in Lasix dose  2.  Continue cardiac meds the same  3.  Consider addition of ACE inhibitor/ARB in future if BP tolerates  4.  Follow-up in CHF clinic on 8/25/2025 as scheduled

## 2025-08-25 ENCOUNTER — TELEPHONE (OUTPATIENT)
Dept: OTHER | Age: 65
End: 2025-08-25

## 2025-08-25 ENCOUNTER — HOSPITAL ENCOUNTER (OUTPATIENT)
Dept: OTHER | Age: 65
Setting detail: THERAPIES SERIES
Discharge: HOME | End: 2025-08-25
Payer: MEDICAID

## 2025-08-25 VITALS
RESPIRATION RATE: 16 BRPM | SYSTOLIC BLOOD PRESSURE: 115 MMHG | BODY MASS INDEX: 30.13 KG/M2 | HEART RATE: 78 BPM | OXYGEN SATURATION: 96 % | WEIGHT: 210 LBS | DIASTOLIC BLOOD PRESSURE: 71 MMHG

## 2025-08-25 LAB
ANION GAP SERPL CALCULATED.3IONS-SCNC: 10 MMOL/L (ref 7–16)
BNP SERPL-MCNC: 308 PG/ML (ref 0–125)
BUN SERPL-MCNC: 13 MG/DL (ref 8–23)
CALCIUM SERPL-MCNC: 9.1 MG/DL (ref 8.8–10.2)
CHLORIDE SERPL-SCNC: 105 MMOL/L (ref 98–107)
CO2 SERPL-SCNC: 24 MMOL/L (ref 22–29)
CREAT SERPL-MCNC: 0.9 MG/DL (ref 0.7–1.2)
GFR, ESTIMATED: >90 ML/MIN/1.73M2
GLUCOSE SERPL-MCNC: 223 MG/DL (ref 74–99)
POTASSIUM SERPL-SCNC: 5.2 MMOL/L (ref 3.5–5.1)
SODIUM SERPL-SCNC: 139 MMOL/L (ref 136–145)

## 2025-08-25 PROCEDURE — 83880 ASSAY OF NATRIURETIC PEPTIDE: CPT

## 2025-08-25 PROCEDURE — 80048 BASIC METABOLIC PNL TOTAL CA: CPT

## 2025-08-25 PROCEDURE — 99214 OFFICE O/P EST MOD 30 MIN: CPT

## 2025-08-25 PROCEDURE — 36415 COLL VENOUS BLD VENIPUNCTURE: CPT

## 2025-09-03 ENCOUNTER — HOSPITAL ENCOUNTER (OUTPATIENT)
Dept: LAB | Age: 65
Discharge: HOME | End: 2025-09-03
Payer: MEDICAID

## 2025-09-03 ENCOUNTER — RESULTS FOLLOW-UP (OUTPATIENT)
Age: 65
End: 2025-09-03

## 2025-09-03 DIAGNOSIS — I50.20 HFREF (HEART FAILURE WITH REDUCED EJECTION FRACTION) (HCC): ICD-10-CM

## 2025-09-03 LAB
ANION GAP SERPL CALCULATED.3IONS-SCNC: 11 MMOL/L (ref 7–16)
BUN SERPL-MCNC: 17 MG/DL (ref 8–23)
CALCIUM SERPL-MCNC: 9.7 MG/DL (ref 8.8–10.2)
CHLORIDE SERPL-SCNC: 101 MMOL/L (ref 98–107)
CO2 SERPL-SCNC: 27 MMOL/L (ref 22–29)
CREAT SERPL-MCNC: 1 MG/DL (ref 0.7–1.2)
GFR, ESTIMATED: 82 ML/MIN/1.73M2
GLUCOSE SERPL-MCNC: 223 MG/DL (ref 74–99)
POTASSIUM SERPL-SCNC: 4.6 MMOL/L (ref 3.5–5.1)
SODIUM SERPL-SCNC: 138 MMOL/L (ref 136–145)

## 2025-09-03 PROCEDURE — 36415 COLL VENOUS BLD VENIPUNCTURE: CPT

## 2025-09-03 PROCEDURE — 80048 BASIC METABOLIC PNL TOTAL CA: CPT

## (undated) DEVICE — Device: Brand: VIRTUOSAPH PLUS WITH RADIAL INDICATION

## (undated) DEVICE — 1LYRTR 16FR10ML100%SILTMPS SNP: Brand: MEDLINE INDUSTRIES, INC.

## (undated) DEVICE — SYRINGE MED 10ML LUERLOCK TIP W/O SFTY DISP

## (undated) DEVICE — BAND COMPR L24CM REG CLR PLAS HEMSTAT EXT HK AND LOOP RETEN

## (undated) DEVICE — GLIDESHEATH SLENDER ACCESS KIT: Brand: GLIDESHEATH SLENDER

## (undated) DEVICE — TOWEL,OR,DSP,ST,WHITE,DLX,4/PK,20PK/CS: Brand: MEDLINE

## (undated) DEVICE — SOLUTION IV 100ML 0.9% SOD CHL PLAS CONT USP VIAFLX 1 PER

## (undated) DEVICE — STERILE LATEX POWDER FREE SURGICAL GLOVES WITH HYDROGEL COATING: Brand: PROTEXIS

## (undated) DEVICE — Device

## (undated) DEVICE — GUIDEWIRE VASC L260CM 0.035IN J TIP L3MM PTFE FIX COR NAMIC

## (undated) DEVICE — CATHETER,URETHRAL,REDRUBBER,STRL,18FR: Brand: MEDLINE

## (undated) DEVICE — AORTIC PUNCHES ARE USED TO CREATE A UNIFORM OPENING IN BLOOD VESSELS DURING CARDIOVASCULAR SURGERY. THE VESSEL GRAFT IS INSERTED INTO THE CREATED OPENING AND SUTURED TO THE VESSEL WALL. AORTIC LANCETS ARE USED TO MAKE A SMALL UNIFORM CUT IN A BLOOD VESSEL TO FACILITATE INSERTION OF AN AORTIC PUNCH.  PUNCHES COME IN VARIOUS LENGTHS, DIAMETERS AND TIP CONFIGURATIONS.: Brand: CLEANCUT ROTATING AORTIC PUNCH

## (undated) DEVICE — BLOWER COR ART L16.5CM PLAS SHFT MAL W/ MIST IV SET AXIUS

## (undated) DEVICE — KIT MFLD ISOLATN NACL CNTRST PRT TBNG SPIK W/ PRSS TRNSDUC

## (undated) DEVICE — DOUBLE BASIN SET: Brand: MEDLINE INDUSTRIES, INC.

## (undated) DEVICE — SET INSUF TUBE HEAT ISO CONN DISP

## (undated) DEVICE — AGENT HEMOSTATIC SURG ORIGINAL ABS 4X8IN LOOSE KNIT 12/CA

## (undated) DEVICE — ALCOHOL RUBBING ISO 16OZ 70%

## (undated) DEVICE — DRAPE THER FLUID WARMING 66X44 IN FLAT SLUSH DBL DISC ORS

## (undated) DEVICE — GLOVE ORANGE PI 7 1/2   MSG9075

## (undated) DEVICE — PACK AUTOLOG AUTOTRANSUFION

## (undated) DEVICE — SOLUTION IV MULT ELECLYT 1000 ML PH 7.4 INJ ISOLYTE S

## (undated) DEVICE — ELECTRODE PT RET AD L9FT HI MOIST COND ADH HYDRGEL CORDED

## (undated) DEVICE — KIT PLT RATIO DISPNS KT 2IN CANN TIP SPRY TIP DISP MAGELLAN

## (undated) DEVICE — CANNULA INJ L2.5IN BLNT TIP 3MM CLR BODY W/ 1 W VLV DLP

## (undated) DEVICE — OPTIVIEW, SACRUM, 9"X9": Brand: MEDLINE

## (undated) DEVICE — CANNULA NSL CANN NSL L25FT TBNG AD O2 SUP SFT UC

## (undated) DEVICE — ANGIOGRAPHIC CATHETER: Brand: EXPO™

## (undated) DEVICE — RADIFOCUS OPTITORQUE ANGIOGRAPHIC CATHETER: Brand: OPTITORQUE

## (undated) DEVICE — GLOVE ORANGE PI 7   MSG9070

## (undated) DEVICE — GLOVE SURG SZ 6 THK91MIL LTX FREE SYN POLYISOPRENE ANTI

## (undated) DEVICE — GLOVE SURG SZ 7.5 L11.73IN FNGR THK9.8MIL STRW LTX POLYMER

## (undated) DEVICE — CLIP LIG M BLU TI HRT SHP WIRE HORZ 24 CLIPS/PK 25PK/CA

## (undated) DEVICE — SOLUTION IV 50ML 0.9% SOD CHL PLAS CONT USP VIAFLX

## (undated) DEVICE — CONNECTOR DRNGE W3/8-0.5XH3/16XL3/16IN 2:1 SIL CARD STR

## (undated) DEVICE — SYRINGE MED 20ML STD CLR PLAS LUERLOCK TIP N CTRL DISP

## (undated) DEVICE — LIQUIBAND RAPID ADHESIVE 36/CS 0.8ML: Brand: MEDLINE

## (undated) DEVICE — KIT ANGIO W/ AT P65 PREM HND CTRL FOR CNTRST DEL ANGIOTOUCH

## (undated) DEVICE — TIP APPL GEL PLT ENDO 5MMX32CM

## (undated) DEVICE — BATTERY PACK FOR VARISPEED: Brand: STRYKER VARISPEED

## (undated) DEVICE — DRAIN SURG SGL COLL PT TB FOR ATS BG OASIS

## (undated) DEVICE — PAD, DEFIB, ADULT, RADIOTRAN, PHYSIO, LO: Brand: MEDLINE

## (undated) DEVICE — EZ GLIDE AORTIC CANNULA: Brand: EDWARDS LIFESCIENCES EZ GLIDE AORTIC CANNULA

## (undated) DEVICE — TOWEL,OR,DSP,ST,BLUE,STD,6/PK,12PK/CS: Brand: MEDLINE

## (undated) DEVICE — SYRINGE IRRIG 60ML SFT PLIABLE BLB EZ TO GRP 1 HND USE W/

## (undated) DEVICE — BNDG,ELSTC,MATRIX,STRL,4"X5YD,LF,HOOK&LP: Brand: MEDLINE

## (undated) DEVICE — GOWN,SIRUS,FABRNF,XL,20/CS: Brand: MEDLINE

## (undated) DEVICE — BLOOD TRANSFUSION FILTER: Brand: HAEMONETICS

## (undated) DEVICE — BNDG,ELSTC,MATRIX,STRL,6"X5YD,LF,HOOK&LP: Brand: MEDLINE

## (undated) DEVICE — AVID DUAL STAGE VENOUS DRAINAGE CANNULA: Brand: AVID DUAL STAGE VENOUS DRAINAGE CANNULA

## (undated) DEVICE — TUBING, SUCTION, 3/16" X 12', STRAIGHT: Brand: MEDLINE

## (undated) DEVICE — GLOVE SURG SZ 65 THK91MIL LTX FREE SYN POLYISOPRENE

## (undated) DEVICE — Device: Brand: CLEANCUT ROTATING AORTIC PUNCH

## (undated) DEVICE — RETRACTOR SURG INSRT SUT HLD OCTOBASE

## (undated) DEVICE — CATHETER THOR 32FR L23IN PVC 6 EYELET STR ATRAUM

## (undated) DEVICE — NEEDLE SPNL 20GA L3.5IN YEL HUB S STL REG WALL FIT STYL

## (undated) DEVICE — 6 FOOT DISPOSABLE EXTENSION CABLE WITH SAFE CONNECT / SCREW-DOWN

## (undated) DEVICE — BASIC SINGLE BASIN 1-LF: Brand: MEDLINE INDUSTRIES, INC.

## (undated) DEVICE — GOWN,SIRUS,FABRNF,L,20/CS: Brand: MEDLINE